# Patient Record
Sex: FEMALE | Race: BLACK OR AFRICAN AMERICAN | Employment: FULL TIME | URBAN - METROPOLITAN AREA
[De-identification: names, ages, dates, MRNs, and addresses within clinical notes are randomized per-mention and may not be internally consistent; named-entity substitution may affect disease eponyms.]

---

## 2017-01-05 ENCOUNTER — NURSE NAVIGATOR (OUTPATIENT)
Dept: CARDIOLOGY CLINIC | Age: 72
End: 2017-01-05

## 2017-03-20 ENCOUNTER — OFFICE VISIT (OUTPATIENT)
Dept: FAMILY MEDICINE CLINIC | Age: 72
End: 2017-03-20

## 2017-03-20 VITALS
OXYGEN SATURATION: 99 % | SYSTOLIC BLOOD PRESSURE: 133 MMHG | WEIGHT: 194.8 LBS | TEMPERATURE: 97.1 F | HEART RATE: 60 BPM | HEIGHT: 62 IN | RESPIRATION RATE: 18 BRPM | DIASTOLIC BLOOD PRESSURE: 74 MMHG | BODY MASS INDEX: 35.85 KG/M2

## 2017-03-20 DIAGNOSIS — I25.810 CORONARY ARTERY DISEASE INVOLVING CORONARY BYPASS GRAFT OF NATIVE HEART WITHOUT ANGINA PECTORIS: ICD-10-CM

## 2017-03-20 DIAGNOSIS — I49.5 SSS (SICK SINUS SYNDROME) (HCC): ICD-10-CM

## 2017-03-20 DIAGNOSIS — I10 ESSENTIAL HYPERTENSION: ICD-10-CM

## 2017-03-20 DIAGNOSIS — R82.81 PYURIA: ICD-10-CM

## 2017-03-20 DIAGNOSIS — I25.9 CHRONIC ISCHEMIC HEART DISEASE: ICD-10-CM

## 2017-03-20 DIAGNOSIS — Z79.899 ENCOUNTER FOR LONG-TERM (CURRENT) USE OF MEDICATIONS: ICD-10-CM

## 2017-03-20 DIAGNOSIS — Z13.39 SCREENING FOR ALCOHOLISM: ICD-10-CM

## 2017-03-20 DIAGNOSIS — Z95.0 S/P CARDIAC PACEMAKER PROCEDURE: ICD-10-CM

## 2017-03-20 DIAGNOSIS — Z12.11 COLON CANCER SCREENING: ICD-10-CM

## 2017-03-20 DIAGNOSIS — Z71.89 ADVANCED DIRECTIVES, COUNSELING/DISCUSSION: ICD-10-CM

## 2017-03-20 DIAGNOSIS — Z95.1 S/P CABG X 4: ICD-10-CM

## 2017-03-20 DIAGNOSIS — E78.5 DYSLIPIDEMIA: ICD-10-CM

## 2017-03-20 DIAGNOSIS — Z00.00 ROUTINE GENERAL MEDICAL EXAMINATION AT A HEALTH CARE FACILITY: Primary | ICD-10-CM

## 2017-03-20 LAB
BILIRUB UR QL STRIP: NEGATIVE
GLUCOSE UR-MCNC: NEGATIVE MG/DL
KETONES P FAST UR STRIP-MCNC: NEGATIVE MG/DL
PH UR STRIP: 5.5 [PH] (ref 4.6–8)
PROT UR QL STRIP: NORMAL MG/DL
SP GR UR STRIP: 1.03 (ref 1–1.03)
UA UROBILINOGEN AMB POC: NORMAL (ref 0.2–1)
URINALYSIS CLARITY POC: NORMAL
URINALYSIS COLOR POC: YELLOW
URINE BLOOD POC: NORMAL
URINE LEUKOCYTES POC: NORMAL
URINE NITRITES POC: POSITIVE

## 2017-03-20 RX ORDER — LOSARTAN POTASSIUM AND HYDROCHLOROTHIAZIDE 12.5; 5 MG/1; MG/1
TABLET ORAL
Qty: 90 TAB | Refills: 3 | Status: SHIPPED | OUTPATIENT
Start: 2017-03-20 | End: 2018-04-02 | Stop reason: SDUPTHER

## 2017-03-20 RX ORDER — SIMVASTATIN 40 MG/1
TABLET, FILM COATED ORAL
Qty: 90 TAB | Refills: 3 | Status: SHIPPED | OUTPATIENT
Start: 2017-03-20 | End: 2018-12-24 | Stop reason: SDUPTHER

## 2017-03-20 NOTE — PROGRESS NOTES
This is a Subsequent Medicare Annual Wellness Visit providing Personalized Prevention Plan Services (PPPS) (Performed 12 months after initial AWV and PPPS )    I have reviewed the patient's medical history in detail and updated the computerized patient record. History     Hyperlipidemia & HTN - also has hx of PSVT and CAD s/p 4vCABG and followed by Dr. Galen Lord. Pt is doing well on current meds with no medication side effects noted. BPs at home running 130-150s/80s  No new myalgias, no joint pains, no weakness  No TIA's, no chest pain on exertion, no dyspnea on exertion, no swelling of ankles. Exercising - Minimal, stays active  Dieting - Yes, drinks some sweet tea, but avoids fast foods and sodas.   Smoking - No     Lab Results   Component Value Date/Time    Cholesterol, total 155 03/22/2016 10:05 AM    HDL Cholesterol 48 03/22/2016 10:05 AM    LDL, calculated 95 03/22/2016 10:05 AM    Triglyceride 62 03/22/2016 10:05 AM       Past Medical History:   Diagnosis Date    Acquired cyst of kidney     CAD (coronary artery disease)     Cervical disc herniation     Essential hypertension     Hypercholesterolemia     Hypertension     PAF (paroxysmal atrial fibrillation) ([facility] [address] 6/9/2016    Paroxysmal supraventricular tachycardia ([facility] [address]     Postsurgical aortocoronary bypass status 3/20/2012    S/P cardiac pacemaker procedure 6/9/2016 6/9/16 [employer] dual chamber pacemaker implant    Urge incontinence       Past Surgical History:   Procedure Laterality Date    CABG, ARTERY-VEIN, FOUR      ENDOSCOPY, COLON, DIAGNOSTIC      due 2012 hx proctitis    HX CERVICAL DISKECTOMY      HX CORONARY ARTERY BYPASS GRAFT      HX HYSTERECTOMY      HX ORTHOPAEDIC      benign thumb tumor    HX UROLOGICAL  2011    stent for hydronephrosis     Current Outpatient Prescriptions   Medication Sig Dispense Refill    simvastatin (ZOCOR) 40 mg tablet TAKE 1 TABLET BY MOUTH EVERY EVENING 90 Tab 3    losartan-hydroCHLOROthiazide (HYZAAR) 50-12.5 mg per tablet TAKE 1 TABLET BY MOUTH DAILY. 90 Tab 3    PSYLLIUM HUSK (DAILY FIBER PO) Take  by mouth daily.  FOLIC ACID/MULTIVIT-MIN/LUTEIN (CENTRUM SILVER PO) Take  by mouth daily.  sotalol (BETAPACE) 80 mg tablet Take 1 Tab by mouth every twelve (12) hours. 60 Tab 11    aspirin (ASPIRIN) 325 mg tablet Take 325 mg by mouth daily.  omega-3 fatty acids-vitamin e (FISH OIL) 1,000 mg Cap Take  by mouth daily.        Allergies   Allergen Reactions    Ace Inhibitors Cough    Pcn [Penicillins] Anaphylaxis and Hives     Just got Rocephin in ED, no reactions    Niaspan [Niacin] Unable to Obtain     Family History   Problem Relation Age of Onset    Asthma Mother     Hypertension Father     Stroke Father     Cancer Sister      breast    Diabetes Brother     Heart Disease Sister     Heart Disease Brother      Social History   Substance Use Topics    Smoking status: Former Smoker     Packs/day: 0.50     Years: 10.00     Types: Cigarettes     Quit date: 6/9/2007    Smokeless tobacco: Never Used    Alcohol use No     Patient Active Problem List   Diagnosis Code    Cervical disc herniation M50.20    Hypertension I10    CAD (coronary artery disease) I25.10    Paroxysmal supraventricular tachycardia (HCC) I47.1    Encounter for long-term (current) use of medications Z79.899    Mixed hyperlipidemia E78.2    Chronic ischemic heart disease I25.9    Postsurgical aortocoronary bypass status Z95.1    Coronary atherosclerosis of native coronary artery I25.10    S/P CABG x 4 Z95.1    Urge incontinence N39.41    Bradycardia, sinus R00.1    Dyslipidemia E78.5    Advanced care planning/counseling discussion Z71.89    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 No    Do you average more than 7 drinks per week? No      Functional Ability and Level of Safety:     Hearing Loss   normal-to-mild    Activities of Daily Living   Self-care. Requires assistance with: no ADLs    Fall Risk     Fall Risk Assessment, last 12 mths 3/20/2017   Able to walk? Yes   Fall in past 12 months? No     Abuse Screen   Patient is not abused    Review of Systems   A comprehensive review of systems was negative except for that written in the HPI.     Physical Examination     Evaluation of Cognitive Function:  Mood/affect:  happy  Appearance: age appropriate  Family member/caregiver input: None    PE  Physical Examination:  General appearance - alert, well appearing, and in no distress  Eyes - sclera anicteric  Nose - normal and patent, no erythema, discharge or polyps  Mouth - mucous membranes moist, pharynx normal without lesions  Neck - supple, no significant adenopathy  Lymphatics - no palpable lymphadenopathy, no hepatosplenomegaly  Chest - clear to auscultation, no wheezes, rales or rhonchi, symmetric air entry  Heart - normal rate, regular rhythm, normal S1, S2, no murmurs, rubs, clicks or gallops  Abdomen - soft, nontender, nondistended, no masses or organomegaly  Breasts & Pelvic - not indicated  Neurological - alert, oriented, normal speech, no focal findings or movement disorder noted  Musculoskeletal - no joint tenderness, deformity or swelling  Extr - no edema  Skin - normal coloration and turgor, no rashes, no suspicious skin lesions noted    Patient Care Team:  Kim Lion MD as PCP - General (Family Practice)  Awais Cool, RN CCM as [address], MD (Cardiology)  Gabby Watson, RN as Ambulatory Care Navigator (Cardiology)    Advice/Referrals/Counseling   Education and counseling provided:  Are appropriate based on today's review and evaluation      Assessment/Plan       ICD-10-CM ICD-9-CM    1. Routine general medical examination at a health care facility Z00.00 V70.0 CBC W/O DIFF      HEMOGLOBIN A1C WITH EAG      LIPID PANEL      METABOLIC PANEL, COMPREHENSIVE      TSH 3RD GENERATION      AMB POC URINALYSIS DIP STICK AUTO W/O MICRO   2. Screening for alcoholism Z13.89 V79.1    3. Advanced directives, counseling/discussion Z71.89 V65.49 ADVANCE CARE PLANNING FIRST 30 MINS   4. SSS (sick sinus syndrome) (HCC) I49.5 427.81    5. S/P cardiac pacemaker procedure Z95.0 V45.01    6. Dyslipidemia E78.5 272.4 simvastatin (ZOCOR) 40 mg tablet      HEMOGLOBIN A1C WITH EAG      LIPID PANEL      METABOLIC PANEL, COMPREHENSIVE      TSH 3RD GENERATION   7. S/P CABG x 4 Z95.1 V45.81 simvastatin (ZOCOR) 40 mg tablet      LIPID PANEL   8. Chronic ischemic heart disease I25.9 414.9 simvastatin (ZOCOR) 40 mg tablet      LIPID PANEL   9. Essential hypertension I10 401.9 losartan-hydroCHLOROthiazide (HYZAAR) 50-12.5 mg per tablet      METABOLIC PANEL, COMPREHENSIVE   10. Coronary artery disease involving coronary bypass graft of native heart without angina pectoris I25.810 414.05 LIPID PANEL   11.  Encounter for long-term (current) use of medications Z79.899 V58.69 CBC W/O DIFF      HEMOGLOBIN A1C WITH EAG      LIPID PANEL      METABOLIC PANEL, COMPREHENSIVE      TSH 3RD GENERATION      AMB POC URINALYSIS DIP STICK AUTO W/O MICRO   12. Pyuria N39.0 791.9 CULTURE, URINE   13. Colon cancer screening Z12.11 V76.51

## 2017-03-20 NOTE — PROGRESS NOTES
Chief Complaint   Patient presents with   [facility] Annual Wellness Visit     Medicare   Room 2

## 2017-03-20 NOTE — MR AVS SNAPSHOT
Visit Information Date & Time Provider Department Dept. Phone Encounter #  
 3/20/2017 11:10 AM Meenakshi Garcia MD [facility] at [address] [id number] Follow-up Instructions Return in about 6 months (around 9/20/2017), or if symptoms worsen or fail to improve. Your Appointments 3/30/2017  9:15 AM  
PACEMAKER with PACEMAKER, [facility]) Appt Note: BSC DCPM 6mo check, declines remote at this time [id number] [facility]  
[phone] [address] [facility]  
  
    
 6/21/2017  9:15 AM  
ESTABLISHED PATIENT with Anna Monique MD  
[facility]) Appt Note: 6 MO. F/U  
 [address]  
[phone] [address] Upcoming Health Maintenance Date Due  
 GLAUCOMA SCREENING Q2Y 5/12/2016 MEDICARE YEARLY EXAM 9/21/2016 COLONOSCOPY 5/1/2017 BREAST CANCER SCRN MAMMOGRAM 6/3/2018 DTaP/Tdap/Td series (2 - Td) 3/20/2027 Allergies as of 3/20/2017  Review Complete On: 3/20/2017 By: Meenakshi Garcia MD  
  
 Severity Noted Reaction Type Reactions Ace Inhibitors Medium 06/09/2010   Side Effect Cough Pcn [Penicillins] Medium 06/09/2010   Systemic Anaphylaxis, Hives Just got Rocephin in ED, no reactions Niaspan [Niacin]  03/20/2012    Unable to Obtain Current Immunizations  Reviewed on 9/21/2015 Name Date Influenza High Dose Vaccine PF 12/22/2015 Pneumococcal Polysaccharide (PPSV-23) 6/11/2013 Not reviewed this visit You Were Diagnosed With   
  
 Codes Comments Routine general medical examination at a health care facility    -  Primary ICD-10-CM: Z00.00 ICD-9-CM: V70.0 Screening for alcoholism     ICD-10-CM: Z13.89 ICD-9-CM: V79.1 Advanced directives, counseling/discussion     ICD-10-CM: Z71.89 ICD-9-CM: V65.49   
 SSS (sick sinus syndrome) (HCC)     ICD-10-CM: I49.5 ICD-9-CM: 427.81 S/P cardiac pacemaker procedure     ICD-10-CM: Z95.0 ICD-9-CM: V45.01 Dyslipidemia     ICD-10-CM: E78.5 ICD-9-CM: 272.4 S/P CABG x 4     ICD-10-CM: Z95.1 ICD-9-CM: V45.81 Chronic ischemic heart disease     ICD-10-CM: I25.9 ICD-9-CM: 414.9 Essential hypertension     ICD-10-CM: I10 
ICD-9-CM: 401.9 Coronary artery disease involving coronary bypass graft of native heart without angina pectoris     ICD-10-CM: I25.810 ICD-9-CM: 414.05 Encounter for long-term (current) use of medications     ICD-10-CM: Z79.899 ICD-9-CM: V58.69 Vitals BP Pulse Temp Resp Height(growth percentile) Weight(growth percentile) 133/74 (BP 1 Location: Left arm, BP Patient Position: Sitting) 60 97.1 °F (36.2 °C) (Oral) 18 5' 2\" (1.575 m) 194 lb 12.8 oz (88.4 kg) LMP SpO2 BMI OB Status Smoking Status 08/24/1993 99% 35.63 kg/m2 Postmenopausal Former Smoker Vitals History BMI and BSA Data Body Mass Index Body Surface Area  
 35.63 kg/m 2 1.97 m 2 Preferred Pharmacy Pharmacy Name Phone [facility]/PHARMACY #[id number]- [facility], [address] [facility] 7 [phone] Your Updated Medication List  
  
   
This list is accurate as of: 3/20/17 12:32 PM.  Always use your most recent med list.  
  
  
  
  
 aspirin 325 mg tablet Commonly known as:  ASPIRIN Take 325 mg by mouth daily. CENTRUM SILVER PO Take  by mouth daily. DAILY FIBER PO Take  by mouth daily. FISH OIL 1,000 mg Cap Generic drug:  omega-3 fatty acids-vitamin e Take  by mouth daily. losartan-hydroCHLOROthiazide 50-12.5 mg per tablet Commonly known as:  HYZAAR  
TAKE 1 TABLET BY MOUTH DAILY. simvastatin 40 mg tablet Commonly known as:  ZOCOR  
TAKE 1 TABLET BY MOUTH EVERY EVENING  
  
 sotalol 80 mg tablet Commonly known as:  Derrick Colla Take 1 Tab by mouth every twelve (12) hours. Prescriptions Sent to Pharmacy Refills  
 simvastatin (ZOCOR) 40 mg tablet 3 Sig: TAKE 1 TABLET BY MOUTH EVERY EVENING Class: Normal  
 Pharmacy: [facility]/pharmacy #[id number]- [facility] - [address] Ph #: [phone]  
 losartan-hydroCHLOROthiazide (HYZAAR) 50-12.5 mg per tablet 3 Sig: TAKE 1 TABLET BY MOUTH DAILY. Class: Normal  
 Pharmacy: [address] Ph #: [phone] We Performed the Following ADVANCE CARE PLANNING FIRST 30 MINS [46694 CPT(R)] AMB POC URINALYSIS DIP STICK AUTO W/O MICRO [71542 CPT(R)] CBC W/O DIFF [98724 CPT(R)] HEMOGLOBIN A1C WITH EAG [72491 CPT(R)] LIPID PANEL [18345 CPT(R)] METABOLIC PANEL, COMPREHENSIVE [71349 CPT(R)] TSH 3RD GENERATION [53930 CPT(R)] Follow-up Instructions Return in about 6 months (around 9/20/2017), or if symptoms worsen or fail to improve. Please provide this summary of care documentation to your next provider. Your primary care clinician is listed as Talia Varma. If you have any questions after today's visit, please call [phone].

## 2017-03-20 NOTE — ACP (ADVANCE CARE PLANNING)
Advance Care Planning    Advance Care Planning (ACP) Provider Conversation Snapshot    Date of ACP Conversation: 03/20/17  Persons included in Conversation:  patient  Length of ACP Conversation in minutes:  16 minutes    Authorized Decision Maker (if patient is incapable of making informed decisions):    This person is:   Healthcare Agent/Medical Power of  under Advance Directive          For Patients with Decision Making Capacity:   Values/Goals: Exploration of values, goals, and preferences if recovery is not expected, even with continued medical treatment in the event of:  Imminent death  Severe, permanent brain injury    Conversation Outcomes / Follow-Up Plan:   Recommended completion of Advance Directive form after review of ACP materials and conversation with prospective healthcare agent

## 2017-03-21 LAB
ALBUMIN SERPL-MCNC: 4.1 G/DL (ref 3.5–4.8)
ALBUMIN/GLOB SERPL: 1.4 {RATIO} (ref 1.2–2.2)
ALP SERPL-CCNC: 80 IU/L (ref 39–117)
ALT SERPL-CCNC: 13 IU/L (ref 0–32)
AST SERPL-CCNC: 22 IU/L (ref 0–40)
BILIRUB SERPL-MCNC: 0.4 MG/DL (ref 0–1.2)
BUN SERPL-MCNC: 17 MG/DL (ref 8–27)
BUN/CREAT SERPL: 21 (ref 11–26)
CALCIUM SERPL-MCNC: 9 MG/DL (ref 8.7–10.3)
CHLORIDE SERPL-SCNC: 105 MMOL/L (ref 96–106)
CHOLEST SERPL-MCNC: 151 MG/DL (ref 100–199)
CO2 SERPL-SCNC: 24 MMOL/L (ref 18–29)
CREAT SERPL-MCNC: 0.82 MG/DL (ref 0.57–1)
ERYTHROCYTE [DISTWIDTH] IN BLOOD BY AUTOMATED COUNT: 15 % (ref 12.3–15.4)
EST. AVERAGE GLUCOSE BLD GHB EST-MCNC: 131 MG/DL
GLOBULIN SER CALC-MCNC: 3 G/DL (ref 1.5–4.5)
GLUCOSE SERPL-MCNC: 89 MG/DL (ref 65–99)
HBA1C MFR BLD: 6.2 % (ref 4.8–5.6)
HCT VFR BLD AUTO: 38.1 % (ref 34–46.6)
HDLC SERPL-MCNC: 45 MG/DL
HGB BLD-MCNC: 12.8 G/DL (ref 11.1–15.9)
LDLC SERPL CALC-MCNC: 88 MG/DL (ref 0–99)
MCH RBC QN AUTO: 26.7 PG (ref 26.6–33)
MCHC RBC AUTO-ENTMCNC: 33.6 G/DL (ref 31.5–35.7)
MCV RBC AUTO: 79 FL (ref 79–97)
NRBC BLD AUTO-RTO: 0 %
PLATELET # BLD AUTO: 193 X10E3/UL (ref 150–379)
POTASSIUM SERPL-SCNC: 4.4 MMOL/L (ref 3.5–5.2)
PROT SERPL-MCNC: 7.1 G/DL (ref 6–8.5)
RBC # BLD AUTO: 4.8 X10E6/UL (ref 3.77–5.28)
SODIUM SERPL-SCNC: 143 MMOL/L (ref 134–144)
TRIGL SERPL-MCNC: 90 MG/DL (ref 0–149)
TSH SERPL DL<=0.005 MIU/L-ACNC: 1.75 UIU/ML (ref 0.45–4.5)
VLDLC SERPL CALC-MCNC: 18 MG/DL (ref 5–40)
WBC # BLD AUTO: 3.5 X10E3/UL (ref 3.4–10.8)

## 2017-03-23 LAB
BACTERIA UR CULT: ABNORMAL
BACTERIA UR CULT: ABNORMAL

## 2017-03-23 NOTE — PROGRESS NOTES
pls call - if she is having urinary symptoms, we should tx with Bactrim. Otherwise, would just call this asymptomatic bacteriuria.   Thanks

## 2017-03-29 NOTE — PROGRESS NOTES
Patient advised urine culture results.  She states that she is not having symptoms at this time but would like prescription for Bactrim sent to pharmacy

## 2017-03-30 ENCOUNTER — CLINICAL SUPPORT (OUTPATIENT)
Dept: CARDIOLOGY CLINIC | Age: 72
End: 2017-03-30

## 2017-03-30 DIAGNOSIS — I49.5 SSS (SICK SINUS SYNDROME) (HCC): ICD-10-CM

## 2017-03-30 DIAGNOSIS — I47.1 PAROXYSMAL SUPRAVENTRICULAR TACHYCARDIA (HCC): ICD-10-CM

## 2017-03-30 DIAGNOSIS — I44.0 FIRST DEGREE AV BLOCK: ICD-10-CM

## 2017-03-30 DIAGNOSIS — Z95.0 S/P CARDIAC PACEMAKER PROCEDURE: Primary | ICD-10-CM

## 2017-03-30 RX ORDER — SULFAMETHOXAZOLE AND TRIMETHOPRIM 800; 160 MG/1; MG/1
1 TABLET ORAL 2 TIMES DAILY
Qty: 6 TAB | Refills: 0 | Status: SHIPPED | OUTPATIENT
Start: 2017-03-30 | End: 2017-04-02

## 2017-05-09 RX ORDER — SOTALOL HYDROCHLORIDE 80 MG/1
80 TABLET ORAL EVERY 12 HOURS
Qty: 60 TAB | Refills: 11 | Status: SHIPPED | OUTPATIENT
Start: 2017-05-09 | End: 2018-06-14 | Stop reason: SDUPTHER

## 2017-06-21 ENCOUNTER — OFFICE VISIT (OUTPATIENT)
Dept: CARDIOLOGY CLINIC | Age: 72
End: 2017-06-21

## 2017-06-21 VITALS
WEIGHT: 192 LBS | SYSTOLIC BLOOD PRESSURE: 131 MMHG | HEIGHT: 62 IN | DIASTOLIC BLOOD PRESSURE: 75 MMHG | OXYGEN SATURATION: 99 % | BODY MASS INDEX: 35.33 KG/M2 | HEART RATE: 60 BPM

## 2017-06-21 DIAGNOSIS — Z95.0 S/P CARDIAC PACEMAKER PROCEDURE: ICD-10-CM

## 2017-06-21 DIAGNOSIS — I49.1 APC (ATRIAL PREMATURE CONTRACTIONS): ICD-10-CM

## 2017-06-21 DIAGNOSIS — I25.810 CORONARY ARTERY DISEASE INVOLVING CORONARY BYPASS GRAFT OF NATIVE HEART WITHOUT ANGINA PECTORIS: ICD-10-CM

## 2017-06-21 DIAGNOSIS — I49.5 SSS (SICK SINUS SYNDROME) (HCC): ICD-10-CM

## 2017-06-21 DIAGNOSIS — E78.2 MIXED HYPERLIPIDEMIA: ICD-10-CM

## 2017-06-21 DIAGNOSIS — I25.9 CHRONIC ISCHEMIC HEART DISEASE: ICD-10-CM

## 2017-06-21 DIAGNOSIS — I48.0 PAF (PAROXYSMAL ATRIAL FIBRILLATION) (HCC): Primary | ICD-10-CM

## 2017-06-21 DIAGNOSIS — E78.5 DYSLIPIDEMIA: ICD-10-CM

## 2017-06-21 DIAGNOSIS — I10 ESSENTIAL HYPERTENSION: ICD-10-CM

## 2017-06-21 RX ORDER — PNEUMOCOCCAL VACCINE POLYVALENT 25; 25; 25; 25; 25; 25; 25; 25; 25; 25; 25; 25; 25; 25; 25; 25; 25; 25; 25; 25; 25; 25; 25 UG/.5ML; UG/.5ML; UG/.5ML; UG/.5ML; UG/.5ML; UG/.5ML; UG/.5ML; UG/.5ML; UG/.5ML; UG/.5ML; UG/.5ML; UG/.5ML; UG/.5ML; UG/.5ML; UG/.5ML; UG/.5ML; UG/.5ML; UG/.5ML; UG/.5ML; UG/.5ML; UG/.5ML; UG/.5ML; UG/.5ML
INJECTION, SOLUTION INTRAMUSCULAR; SUBCUTANEOUS
Refills: 0 | COMMUNITY
Start: 2017-03-30 | End: 2017-09-21 | Stop reason: ALTCHOICE

## 2017-09-21 ENCOUNTER — OFFICE VISIT (OUTPATIENT)
Dept: FAMILY MEDICINE CLINIC | Age: 72
End: 2017-09-21

## 2017-09-21 VITALS
TEMPERATURE: 97.1 F | DIASTOLIC BLOOD PRESSURE: 77 MMHG | BODY MASS INDEX: 35.33 KG/M2 | WEIGHT: 192 LBS | HEART RATE: 62 BPM | OXYGEN SATURATION: 95 % | SYSTOLIC BLOOD PRESSURE: 139 MMHG | RESPIRATION RATE: 16 BRPM | HEIGHT: 62 IN

## 2017-09-21 DIAGNOSIS — I49.5 SSS (SICK SINUS SYNDROME) (HCC): ICD-10-CM

## 2017-09-21 DIAGNOSIS — Z95.1 S/P CABG X 4: ICD-10-CM

## 2017-09-21 DIAGNOSIS — R73.03 PREDIABETES: ICD-10-CM

## 2017-09-21 DIAGNOSIS — I25.10 ATHEROSCLEROSIS OF NATIVE CORONARY ARTERY OF NATIVE HEART WITHOUT ANGINA PECTORIS: ICD-10-CM

## 2017-09-21 DIAGNOSIS — I48.0 PAF (PAROXYSMAL ATRIAL FIBRILLATION) (HCC): ICD-10-CM

## 2017-09-21 DIAGNOSIS — I25.9 CHRONIC ISCHEMIC HEART DISEASE: Primary | ICD-10-CM

## 2017-09-21 DIAGNOSIS — Z95.0 S/P CARDIAC PACEMAKER PROCEDURE: ICD-10-CM

## 2017-09-21 DIAGNOSIS — I47.1 PAROXYSMAL SUPRAVENTRICULAR TACHYCARDIA (HCC): ICD-10-CM

## 2017-09-21 DIAGNOSIS — Z12.11 COLON CANCER SCREENING: ICD-10-CM

## 2017-09-21 DIAGNOSIS — I10 ESSENTIAL HYPERTENSION: ICD-10-CM

## 2017-09-21 DIAGNOSIS — Z79.899 ENCOUNTER FOR LONG-TERM (CURRENT) USE OF MEDICATIONS: ICD-10-CM

## 2017-09-21 NOTE — PROGRESS NOTES
Subjective:     Chief Complaint   Patient presents with    Hypertension     6 month follow-up        She  is a 67 y.o. female who presents for evaluation of:    Hyperlipidemia & HTN - also has hx of PSVT and CAD s/p 4vCABG and followed by Dr. Bhavana Strickland. Pt is doing well on current meds with no medication side effects noted. BPs at home running 130-150s/80s  No new myalgias, no joint pains, no weakness  No TIA's, no chest pain on exertion, no dyspnea on exertion, no swelling of ankles. Exercising - Minimal, stays active  Dieting - Yes, drinks some sweet tea, but avoids fast foods and sodas.   Smoking - No     Lab Results   Component Value Date/Time    Cholesterol, total 151 03/20/2017 12:56 PM    HDL Cholesterol 45 03/20/2017 12:56 PM    LDL, calculated 88 03/20/2017 12:56 PM    Triglyceride 90 03/20/2017 12:56 PM     ROS  Gen - no fever/chills  Resp - no dyspnea or cough  CV - no chest pain or GARZA  Rest per HPI     Objective:     Vitals:    09/21/17 1041   BP: 139/77   Pulse: 62   Resp: 16   Temp: 97.1 °F (36.2 °C)   TempSrc: Oral   SpO2: 95%   Weight: 192 lb (87.1 kg)   Height: 5' 2\" (1.575 m)     Physical Examination:  General appearance - alert, well appearing, and in no distress  Eyes -sclera anicteric  Neck - supple, no significant adenopathy, no thyromegaly, no bruits  Chest - clear to auscultation, no wheezes, rales or rhonchi, symmetric air entry  Heart - normal rate, regular rhythm, normal S1, S2, no murmurs, rubs, clicks or gallops, + pacemaker  Neurological - alert, oriented, normal speech, no focal findings or movement disorder noted  Extr - trace edema bilat  Skin - central chest surgical scar noted    Past Medical History:   Diagnosis Date    Acquired cyst of kidney     CAD (coronary artery disease)     Cervical disc herniation     Essential hypertension     Hypercholesterolemia     Hypertension     PAF (paroxysmal atrial fibrillation) ([facility] [address] 6/9/2016    Paroxysmal supraventricular tachycardia ([facility] [address]     Postsurgical aortocoronary bypass status 3/20/2012    S/P cardiac pacemaker procedure 6/9/2016 6/9/16 [employer] dual chamber pacemaker implant    Urge incontinence      Past Surgical History:   Procedure Laterality Date    CABG, ARTERY-VEIN, FOUR      ENDOSCOPY, COLON, DIAGNOSTIC      due 2012 hx proctitis    HX CERVICAL DISKECTOMY      HX CORONARY ARTERY BYPASS GRAFT      HX HYSTERECTOMY      HX ORTHOPAEDIC      benign thumb tumor    HX UROLOGICAL  2011    stent for hydronephrosis     Current Outpatient Prescriptions on File Prior to Visit   Medication Sig Dispense Refill    sotalol (BETAPACE) 80 mg tablet Take 1 Tab by mouth every twelve (12) hours. 60 Tab 11    simvastatin (ZOCOR) 40 mg tablet TAKE 1 TABLET BY MOUTH EVERY EVENING 90 Tab 3    losartan-hydroCHLOROthiazide (HYZAAR) 50-12.5 mg per tablet TAKE 1 TABLET BY MOUTH DAILY. 90 Tab 3    PSYLLIUM HUSK (DAILY FIBER PO) Take  by mouth daily.  FOLIC ACID/MULTIVIT-MIN/LUTEIN (CENTRUM SILVER PO) Take  by mouth daily.  aspirin (ASPIRIN) 325 mg tablet Take 325 mg by mouth daily.  omega-3 fatty acids-vitamin e (FISH OIL) 1,000 mg Cap Take  by mouth daily. No current facility-administered medications on file prior to visit. Allergies   Allergen Reactions    Ace Inhibitors Cough    Pcn [Penicillins] Anaphylaxis and Hives     Just got Rocephin in ED, no reactions    Niaspan [Niacin] Unable to Obtain       Assessment/ Plan:   Diagnoses and all orders for this visit:    1. Chronic ischemic heart disease  2. Atherosclerosis of native coronary artery of native heart without angina pectoris  3. S/P CABG x 4  - Followed by Dr. Hopper Figures. Lipids at goal.  No sx. Planning to get stress test in future. -     LIPID PANEL  -     METABOLIC PANEL, COMPREHENSIVE    4. Essential hypertension - controlled  -     METABOLIC PANEL, COMPREHENSIVE    5. PAF (paroxysmal atrial fibrillation) ([facility] [address]  6.  SSS (sick sinus syndrome) (HCC)  7. Paroxysmal supraventricular tachycardia ([facility] [address]  8. S/P cardiac pacemaker procedure  - Followed by Dr. Maria E Waller and Dr. Rob Mark    9. Prediabetes    10. Encounter for long-term (current) use of medications  -     LIPID PANEL  -     METABOLIC PANEL, COMPREHENSIVE    11. Colon cancer screening  -     REFERRAL TO GASTROENTEROLOGY    I have discussed the diagnosis with the patient and the intended plan as seen in the above orders. The patient has received an after-visit summary and questions were answered concerning future plans. I have discussed medication side effects and warnings with the patient as well. Follow-up Disposition:  Return in about 6 months (around 3/21/2018), or if symptoms worsen or fail to improve.

## 2017-09-21 NOTE — MR AVS SNAPSHOT
Visit Information Date & Time Provider Department Dept. Phone Encounter #  
 9/21/2017 10:30 AM Julieta Olsen MD [facility] at [address] [id number] Follow-up Instructions Return in about 6 months (around 3/21/2018), or if symptoms worsen or fail to improve. Your Appointments 10/3/2017  9:45 AM  
PACEMAKER with PACEMAKER, [facility]) Appt Note: BSC DCPM 6mo check, annual with Dr. Ge Tinoco, prefers AM appts; [facility] DCPM 6mo check, annual with Dr. Ge Tinoco, prefers AM appts [address]  
[phone] [address]  
  
    
 10/3/2017  9:45 AM  
ANNUAL with Bozena Fernandes MD  
[facility]) Appt Note: BSC DCPM 6mo check, annual with Dr. Ge Tinoco, prefers AM appts [address]  
[phone] [address]  
  
    
 12/20/2017  9:15 AM  
ESTABLISHED PATIENT with Zoie Singh MD  
[facility]) Appt Note: 6 MO. F/U  
 [address] [place], [place] [address] [address] [place] [address]  
[phone] [address] Upcoming Health Maintenance Date Due  
 GLAUCOMA SCREENING Q2Y 5/12/2016 COLONOSCOPY 5/1/2017 INFLUENZA AGE 9 TO ADULT 11/16/2017* MEDICARE YEARLY EXAM 3/21/2018 BREAST CANCER SCRN MAMMOGRAM 6/3/2018 DTaP/Tdap/Td series (2 - Td) 3/20/2027 *Topic was postponed. The date shown is not the original due date. Allergies as of 9/21/2017  Review Complete On: 9/21/2017 By: Julieta Olsen MD  
  
 Severity Noted Reaction Type Reactions Ace Inhibitors Medium 06/09/2010   Side Effect Cough Pcn [Penicillins] Medium 06/09/2010   Systemic Anaphylaxis, Hives Just got Rocephin in ED, no reactions Niaspan [Niacin]  03/20/2012    Unable to Obtain Current Immunizations  Reviewed on 9/21/2015 Name Date Influenza High Dose Vaccine PF 12/22/2015 Pneumococcal Polysaccharide (PPSV-23) 3/3/2017, 6/11/2013 Not reviewed this visit You Were Diagnosed With   
  
 Codes Comments Chronic ischemic heart disease    -  Primary ICD-10-CM: I25.9 ICD-9-CM: 414.9 Atherosclerosis of native coronary artery of native heart without angina pectoris     ICD-10-CM: I25.10 ICD-9-CM: 414.01 S/P CABG x 4     ICD-10-CM: Z95.1 ICD-9-CM: V45.81 Essential hypertension     ICD-10-CM: I10 
ICD-9-CM: 401.9 PAF (paroxysmal atrial fibrillation) (HCC)     ICD-10-CM: I48.0 ICD-9-CM: 427.31   
 SSS (sick sinus syndrome) (HCC)     ICD-10-CM: I49.5 ICD-9-CM: 427.81 Paroxysmal supraventricular tachycardia (HCC)     ICD-10-CM: I47.1 ICD-9-CM: 427.0 S/P cardiac pacemaker procedure     ICD-10-CM: Z95.0 ICD-9-CM: V45.01 Prediabetes     ICD-10-CM: R73.03 
ICD-9-CM: 790.29 Encounter for long-term (current) use of medications     ICD-10-CM: Z79.899 ICD-9-CM: V58.69 Colon cancer screening     ICD-10-CM: Z12.11 ICD-9-CM: V76.51 Vitals BP Pulse Temp Resp Height(growth percentile) Weight(growth percentile) 139/77 (BP 1 Location: Left arm, BP Patient Position: Sitting) 62 97.1 °F (36.2 °C) (Oral) 16 5' 2\" (1.575 m) 192 lb (87.1 kg) LMP SpO2 BMI OB Status Smoking Status 08/24/1993 95% 35.12 kg/m2 Postmenopausal Former Smoker Vitals History BMI and BSA Data Body Mass Index Body Surface Area  
 35.12 kg/m 2 1.95 m 2 Preferred Pharmacy Pharmacy Name Phone [facility]/PHARMACY #[id number]- [place]athy [facility], [id number] [place], [place] [address] 8 [phone] Your Updated Medication List  
  
   
This list is accurate as of: 9/21/17 11:02 AM.  Always use your most recent med list.  
  
  
  
  
 aspirin 325 mg tablet Commonly known as:  ASPIRIN Take 325 mg by mouth daily. CENTRUM SILVER PO Take  by mouth daily. DAILY FIBER PO Take  by mouth daily. FISH OIL 1,000 mg Cap Generic drug:  omega-3 fatty acids-vitamin e Take  by mouth daily. losartan-hydroCHLOROthiazide 50-12.5 mg per tablet Commonly known as:  HYZAAR  
TAKE 1 TABLET BY MOUTH DAILY. simvastatin 40 mg tablet Commonly known as:  ZOCOR  
TAKE 1 TABLET BY MOUTH EVERY EVENING  
  
 sotalol 80 mg tablet Commonly known as:  [place] Take 1 Tab by mouth every twelve (12) hours. We Performed the Following LIPID PANEL [15783 CPT(R)] METABOLIC PANEL, COMPREHENSIVE [56786 CPT(R)] REFERRAL TO GASTROENTEROLOGY [EQM86 Custom] Comments: At [address] [facility] location for CRCS please. Follow-up Instructions Return in about 6 months (around 3/21/2018), or if symptoms worsen or fail to improve. Referral Information Referral ID Referred By Referred To  
  
 [id number] [facility] [address] [address] [place], [address] [place] Visits Status Start Date End Date 1 New Request 9/21/17 9/21/18 If your referral has a status of pending review or denied, additional information will be sent to support the outcome of this decision. Please provide this summary of care documentation to your next provider. Your primary care clinician is listed as Juan Antonio Dobbins. If you have any questions after today's visit, please call [phone].

## 2017-09-22 LAB
ALBUMIN SERPL-MCNC: 4.1 G/DL (ref 3.5–4.8)
ALBUMIN/GLOB SERPL: 1.3 {RATIO} (ref 1.2–2.2)
ALP SERPL-CCNC: 94 IU/L (ref 39–117)
ALT SERPL-CCNC: 12 IU/L (ref 0–32)
AST SERPL-CCNC: 20 IU/L (ref 0–40)
BILIRUB SERPL-MCNC: 0.5 MG/DL (ref 0–1.2)
BUN SERPL-MCNC: 13 MG/DL (ref 8–27)
BUN/CREAT SERPL: 15 (ref 12–28)
CALCIUM SERPL-MCNC: 9 MG/DL (ref 8.7–10.3)
CHLORIDE SERPL-SCNC: 100 MMOL/L (ref 96–106)
CHOLEST SERPL-MCNC: 132 MG/DL (ref 100–199)
CO2 SERPL-SCNC: 25 MMOL/L (ref 18–29)
CREAT SERPL-MCNC: 0.87 MG/DL (ref 0.57–1)
GLOBULIN SER CALC-MCNC: 3.2 G/DL (ref 1.5–4.5)
GLUCOSE SERPL-MCNC: 88 MG/DL (ref 65–99)
HDLC SERPL-MCNC: 43 MG/DL
LDLC SERPL CALC-MCNC: 75 MG/DL (ref 0–99)
POTASSIUM SERPL-SCNC: 4.1 MMOL/L (ref 3.5–5.2)
PROT SERPL-MCNC: 7.3 G/DL (ref 6–8.5)
SODIUM SERPL-SCNC: 140 MMOL/L (ref 134–144)
TRIGL SERPL-MCNC: 68 MG/DL (ref 0–149)
VLDLC SERPL CALC-MCNC: 14 MG/DL (ref 5–40)

## 2017-10-03 ENCOUNTER — CLINICAL SUPPORT (OUTPATIENT)
Dept: CARDIOLOGY CLINIC | Age: 72
End: 2017-10-03

## 2017-10-03 ENCOUNTER — OFFICE VISIT (OUTPATIENT)
Dept: CARDIOLOGY CLINIC | Age: 72
End: 2017-10-03

## 2017-10-03 VITALS
DIASTOLIC BLOOD PRESSURE: 72 MMHG | HEIGHT: 62 IN | OXYGEN SATURATION: 95 % | BODY MASS INDEX: 36.07 KG/M2 | SYSTOLIC BLOOD PRESSURE: 120 MMHG | HEART RATE: 59 BPM | RESPIRATION RATE: 16 BRPM | WEIGHT: 196 LBS

## 2017-10-03 DIAGNOSIS — I48.0 PAF (PAROXYSMAL ATRIAL FIBRILLATION) (HCC): ICD-10-CM

## 2017-10-03 DIAGNOSIS — R00.1 BRADYCARDIA, SINUS: ICD-10-CM

## 2017-10-03 DIAGNOSIS — I10 ESSENTIAL HYPERTENSION: ICD-10-CM

## 2017-10-03 DIAGNOSIS — Z95.0 S/P CARDIAC PACEMAKER PROCEDURE: Primary | ICD-10-CM

## 2017-10-03 DIAGNOSIS — I49.5 SSS (SICK SINUS SYNDROME) (HCC): Primary | ICD-10-CM

## 2017-10-03 DIAGNOSIS — I25.810 CORONARY ARTERY DISEASE INVOLVING CORONARY BYPASS GRAFT OF NATIVE HEART WITHOUT ANGINA PECTORIS: ICD-10-CM

## 2017-10-03 NOTE — MR AVS SNAPSHOT
Visit Information Date & Time Provider Department Dept. Phone Encounter #  
 10/3/2017  9:45 AM Charley Cook, [address] [facility] [phone] [id number] Your Appointments 12/20/2017  9:15 AM  
ESTABLISHED PATIENT with Jarrod Brown MD  
[facility]) Appt Note: 6 MO. F/U  
 [address]  
[phone] [address]  
  
    
 3/22/2018 10:30 AM  
ROUTINE CARE with Lucy Strange MD  
[facility] at ED [facility]) Appt Note: 6m fu  
 [address] [facility] [address] [id number]  
[place] [address] [address]  
  
    
 4/3/2018  9:30 AM  
PACEMAKER with PACEMAKER, [facility]) Appt Note: 6mo bsc dcpm  
 [address] [facility]  
[phone] [address] [facility] Upcoming Health Maintenance Date Due  
 GLAUCOMA SCREENING Q2Y 5/12/2016 COLONOSCOPY 5/1/2017 INFLUENZA AGE 9 TO ADULT 11/16/2017* MEDICARE YEARLY EXAM 3/21/2018 BREAST CANCER SCRN MAMMOGRAM 6/3/2018 DTaP/Tdap/Td series (2 - Td) 3/20/2027 *Topic was postponed. The date shown is not the original due date. Allergies as of 10/3/2017  Review Complete On: 10/3/2017 By: Allen Garza LPN Severity Noted Reaction Type Reactions Ace Inhibitors Medium 06/09/2010   Side Effect Cough Pcn [Penicillins] Medium 06/09/2010   Systemic Anaphylaxis, Hives Just got Rocephin in ED, no reactions Niaspan [Niacin]  03/20/2012    Unable to Obtain Current Immunizations  Reviewed on 9/21/2015 Name Date Influenza High Dose Vaccine PF 12/22/2015 Pneumococcal Polysaccharide (PPSV-23) 3/3/2017, 6/11/2013 Not reviewed this visit You Were Diagnosed With   
  
 Codes Comments SSS (sick sinus syndrome) ([facility])    -  Primary ICD-10-CM: I49.5 ICD-9-CM: 427.81   
 PAF (paroxysmal atrial fibrillation) ([facility])     ICD-10-CM: I48.0 ICD-9-CM: 427.31 Essential hypertension     ICD-10-CM: I10 
ICD-9-CM: 401.9 Coronary artery disease involving coronary bypass graft of native heart without angina pectoris     ICD-10-CM: I25.810 ICD-9-CM: 414.05 Vitals BP Pulse Resp Height(growth percentile) Weight(growth percentile) LMP  
 120/72 (BP 1 Location: Right arm, BP Patient Position: Sitting) (!) 59 16 5' 2\" (1.575 m) 196 lb (88.9 kg) 08/24/1993 SpO2 BMI OB Status Smoking Status 95% 35.85 kg/m2 Postmenopausal Former Smoker Vitals History BMI and BSA Data Body Mass Index Body Surface Area  
 35.85 kg/m 2 1.97 m 2 Preferred Pharmacy Pharmacy Name Phone [facility][id number]- [id number] [phone] Your Updated Medication List  
  
   
This list is accurate as of: 10/3/17 10:17 AM.  Always use your most recent med list.  
  
  
  
  
 aspirin 325 mg tablet Commonly known as:  ASPIRIN Take 325 mg by mouth daily. CENTRUM SILVER PO Take  by mouth daily. DAILY FIBER PO Take  by mouth daily. FISH OIL 1,000 mg Cap Generic drug:  omega-3 fatty acids-vitamin e Take  by mouth daily. losartan-hydroCHLOROthiazide 50-12.5 mg per tablet Commonly known as:  HYZAAR  
TAKE 1 TABLET BY MOUTH DAILY. simvastatin 40 mg tablet Commonly known as:  ZOCOR  
TAKE 1 TABLET BY MOUTH EVERY EVENING  
  
 sotalol 80 mg tablet Commonly known as:  Ami Ni Take 1 Tab by mouth every twelve (12) hours. We Performed the Following AMB POC EKG ROUTINE W/ 12 LEADS, INTER & REP [84062 CPT(R)] Introducing \[facility] [place] [facility]\"" & HEALTH SERVICES! Dear Maynor Mishra: 
Thank you for requesting a MyChart account.   Our records indicate that you have previously registered for a [employer] account but its currently inactive. Please call our [employer] support line at [phone]. Additional Information If you have questions, please visit the Frequently Asked Questions section of the [employer] website at [email or web address] Remember, [employer] is NOT to be used for urgent needs. For medical emergencies, dial 911. Now available from your iPhone and Android! Please provide this summary of care documentation to your next provider. Your primary care clinician is listed as Roel Porras. If you have any questions after today's visit, please call [phone].

## 2017-10-03 NOTE — PROGRESS NOTES
Subjective:      Evelin Hall is a 67 y.o. female is here for yearly device follow up. She is doing well. The patient denies chest pain/ shortness of breath, orthopnea, PND, LE edema, palpitations, syncope, presyncope or fatigue.        Patient Active Problem List    Diagnosis Date Noted    Prediabetes 09/21/2017    S/P cardiac pacemaker procedure 06/09/2016    PAF (paroxysmal atrial fibrillation) (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 [address] 05/23/2016    Advanced care planning/counseling discussion 03/22/2016    Dyslipidemia 06/12/2014    Bradycardia, sinus 05/10/2013    Urge incontinence     S/P CABG x 4 09/20/2012    Mixed hyperlipidemia 03/20/2012    Chronic ischemic heart disease 03/20/2012    Postsurgical aortocoronary bypass status 03/20/2012    Coronary atherosclerosis of native coronary artery 03/20/2012    Encounter for long-term (current) use of medications 07/28/2010    Cervical disc herniation     Hypertension     CAD (coronary artery disease)     Paroxysmal supraventricular tachycardia (HCC)       Talia Varma MD  Past Medical History:   Diagnosis Date    Acquired cyst of kidney     CAD (coronary artery disease)     Cervical disc herniation     Essential hypertension     Hypercholesterolemia     Hypertension     PAF (paroxysmal atrial fibrillation) ([address] 6/9/2016    Paroxysmal supraventricular tachycardia ([address]     Postsurgical aortocoronary bypass status 3/20/2012    S/P cardiac pacemaker procedure 6/9/2016 6/9/16 [employer] dual chamber pacemaker implant    Urge incontinence       Past Surgical History:   Procedure Laterality Date    CABG, ARTERY-VEIN, FOUR      ENDOSCOPY, COLON, DIAGNOSTIC      due 2012 hx proctitis    HX CERVICAL DISKECTOMY      HX CORONARY ARTERY BYPASS GRAFT      HX HYSTERECTOMY      HX ORTHOPAEDIC      benign thumb tumor    HX UROLOGICAL  2011    stent for hydronephrosis     Allergies   Allergen Reactions    Ace Inhibitors Cough    Pcn [Penicillins] Anaphylaxis and Hives     Just got Rocephin in ED, no reactions    Niaspan [Niacin] Unable to Obtain      Family History   Problem Relation Age of Onset    Asthma Mother     Hypertension Father     Stroke Father     Cancer Sister      breast    Diabetes Brother     Heart Disease Sister     Heart Disease Brother     negative for cardiac disease  Social History     Social History    Marital status:      Spouse name: N/A    Number of children: N/A    Years of education: N/A     Social History Main Topics    Smoking status: Former Smoker     Packs/day: 0.50     Years: 10.00     Types: Cigarettes     Quit date: 6/9/2007    Smokeless tobacco: Never Used    Alcohol use No    Drug use: No    Sexual activity: No     Other Topics Concern    None     Social History Narrative     Current Outpatient Prescriptions   Medication Sig    sotalol (BETAPACE) 80 mg tablet Take 1 Tab by mouth every twelve (12) hours.  simvastatin (ZOCOR) 40 mg tablet TAKE 1 TABLET BY MOUTH EVERY EVENING    losartan-hydroCHLOROthiazide (HYZAAR) 50-12.5 mg per tablet TAKE 1 TABLET BY MOUTH DAILY.  PSYLLIUM HUSK (DAILY FIBER PO) Take  by mouth daily.  FOLIC ACID/MULTIVIT-MIN/LUTEIN (CENTRUM SILVER PO) Take  by mouth daily.  aspirin (ASPIRIN) 325 mg tablet Take 325 mg by mouth daily.  omega-3 fatty acids-vitamin e (FISH OIL) 1,000 mg Cap Take  by mouth daily. No current facility-administered medications for this visit. Vitals:    10/03/17 0933   BP: 120/72   Pulse: (!) 59   Resp: 16   SpO2: 95%   Weight: 196 lb (88.9 kg)   Height: 5' 2\" (1.575 m)       I have reviewed the nurses notes, vitals, problem list, allergy list, medical history, family, social history and medications.     Review of Symptoms:    General: Pt denies excessive weight gain or loss. Pt is able to conduct ADL's  HEENT: Denies blurred vision, headaches, epistaxis and difficulty swallowing. Respiratory: Denies shortness of breath, GARZA, wheezing or stridor. Cardiovascular: Denies precordial pain, palpitations, edema or PND  Gastrointestinal: Denies poor appetite, indigestion, abdominal pain or blood in stool  Urinary: Denies dysuria, pyuria  Musculoskeletal: Denies pain or swelling from muscles or joints  Neurologic: Denies tremor, paresthesias, or sensory motor disturbance  Skin: Denies rash, itching or texture change. Psych: Denies depression      Physical Exam:      General: Well developed, in no acute distress. HEENT: Eyes - PERRL, no jvd  Heart:  Normal S1/S2 negative S3 or S4. Regular, no murmur, gallop or rub.   Respiratory: Clear bilaterally x 4, no wheezing or rales  Abdomen:   Soft, non-tender, bowel sounds are active.   Extremities:  No edema, normal cap refill, no cyanosis. Musculoskeletal: No clubbing  Neuro: A&Ox3, speech clear, gait stable. Skin: Skin color is normal. No rashes or lesions.  Non diaphoretic  Vascular: 2+ pulses symmetric in all extremities    Cardiographics    Ekg: ventricular pacing  BSC PM: 54%, 16% RVP  No episodes, 9 years battery     Results for orders placed or performed during the hospital encounter of 06/09/16   EKG, 12 LEAD, INITIAL   Result Value Ref Range    Ventricular Rate 62 BPM    Atrial Rate 62 BPM    P-R Interval 260 ms    QRS Duration 80 ms    Q-T Interval 478 ms    QTC Calculation (Bezet) 485 ms    Calculated P Axis 49 degrees    Calculated R Axis 27 degrees    Calculated T Axis 45 degrees    Diagnosis       ** Poor data quality, interpretation may be adversely affected  Sinus rhythm with 1st degree AV block with premature atrial complexes  Nonspecific T wave abnormality Lead V6 is missing   Prolonged QT  When compared with ECG of 09-JUN-2016 13:27,  Previous ECG has undetermined rhythm, needs review  Nonspecific T wave abnormality, improved in Inferior leads  Nonspecific T wave abnormality, improved in Lateral leads  Confirmed by Sumit Quezada ([id number]) on 6/10/2016 9:53:08 AM     Results for orders placed or performed in visit on 05/23/16   CARDIAC HOLTER MONITOR, 24 HOURS    Narrative    ECG Monitor/24 hours, Complete    Reason for Holter Monitor  ECTOPIC ATRIAL RHYTHM    Heartbeat    Slowest 42  Average 81  Fastest  138        Results:   Underlying Rhythm: Normal sinus rhythm      Atrial Arrhythmias: premature atrial contractions; occasional,  paroxysmal atrial fibrillation, paroxysmal atrial flutter and  severe bradycardia            AV Conduction: normal    Ventricular Arrhythmias: premature ventricular contractions;  occasional     ST Segment Analysis:normal     Symptom Correlation:  None reported    Comment:   Sinus rhythm with\ profound daytime bradycardia in the 40s and  pafib/pafl with rvr. Clinical correlation advised. Rickey Valdez MD, [facility]            Lab Results   Component Value Date/Time    WBC 3.5 03/20/2017 12:56 PM    HGB 12.8 03/20/2017 12:56 PM    HCT 38.1 03/20/2017 12:56 PM    PLATELET 355 11/46/6221 12:56 PM    MCV 79 03/20/2017 12:56 PM      Lab Results   Component Value Date/Time    Sodium 140 09/21/2017 11:17 AM    Potassium 4.1 09/21/2017 11:17 AM    Chloride 100 09/21/2017 11:17 AM    CO2 25 09/21/2017 11:17 AM    Anion gap 7 06/10/2016 05:10 AM    Glucose 88 09/21/2017 11:17 AM    BUN 13 09/21/2017 11:17 AM    Creatinine 0.87 09/21/2017 11:17 AM    BUN/Creatinine ratio 15 09/21/2017 11:17 AM    GFR est AA 76 09/21/2017 11:17 AM    GFR est non-AA 66 09/21/2017 11:17 AM    Calcium 9.0 09/21/2017 11:17 AM    Bilirubin, total 0.5 09/21/2017 11:17 AM    AST (SGOT) 20 09/21/2017 11:17 AM    Alk.  phosphatase 94 09/21/2017 11:17 AM    Protein, total 7.3 09/21/2017 11:17 AM    Albumin 4.1 09/21/2017 11:17 AM    Globulin 4.0 05/03/2011 04:50 AM    A-G Ratio 1.3 09/21/2017 11:17 AM    ALT (SGPT) 12 09/21/2017 11:17 AM         Assessment:     Assessment:        ICD-10-CM ICD-9-CM    1. SSS (sick sinus syndrome) ([facility]) I49.5 427.81 AMB POC EKG ROUTINE W/ 12 LEADS, INTER & REP   2. PAF (paroxysmal atrial fibrillation) ([facility]) I48.0 427.31    3. Essential hypertension I10 401.9    4. Coronary artery disease involving coronary bypass graft of native heart without angina pectoris I25.810 414.05      Orders Placed This Encounter    AMB POC EKG ROUTINE W/ 12 LEADS, INTER & REP     Order Specific Question:   Reason for Exam:     Answer:   routine        Plan:   Ms. Alissa Zuniga is here for yearly device follow up. She is doing well. EKG demonstrates ventricular pacing and device interrogation reveals normal functioning (54% AP, 16% RVP). Continue current medical therapy and follow up with Dr. Barb aBl in one year. Continue medical management for SSS, HTN, CAD. Thank you for allowing me to participate in Karthik Day 's care.     SRIRAM Sharp MD, [facility]

## 2017-12-20 ENCOUNTER — OFFICE VISIT (OUTPATIENT)
Dept: CARDIOLOGY CLINIC | Age: 72
End: 2017-12-20

## 2017-12-20 VITALS
WEIGHT: 196.8 LBS | DIASTOLIC BLOOD PRESSURE: 76 MMHG | OXYGEN SATURATION: 96 % | BODY MASS INDEX: 36.22 KG/M2 | HEIGHT: 62 IN | HEART RATE: 60 BPM | SYSTOLIC BLOOD PRESSURE: 132 MMHG

## 2017-12-20 DIAGNOSIS — Z95.1 S/P CABG X 4: ICD-10-CM

## 2017-12-20 DIAGNOSIS — I48.0 PAF (PAROXYSMAL ATRIAL FIBRILLATION) (HCC): ICD-10-CM

## 2017-12-20 DIAGNOSIS — R73.03 PREDIABETES: ICD-10-CM

## 2017-12-20 DIAGNOSIS — I49.5 SSS (SICK SINUS SYNDROME) (HCC): ICD-10-CM

## 2017-12-20 DIAGNOSIS — I44.0 FIRST DEGREE AV BLOCK: ICD-10-CM

## 2017-12-20 DIAGNOSIS — I25.9 CHRONIC ISCHEMIC HEART DISEASE: Primary | ICD-10-CM

## 2017-12-20 DIAGNOSIS — Z95.0 S/P CARDIAC PACEMAKER PROCEDURE: ICD-10-CM

## 2017-12-20 DIAGNOSIS — I49.1 APC (ATRIAL PREMATURE CONTRACTIONS): ICD-10-CM

## 2017-12-20 DIAGNOSIS — I10 ESSENTIAL HYPERTENSION: ICD-10-CM

## 2017-12-20 DIAGNOSIS — E78.2 MIXED HYPERLIPIDEMIA: ICD-10-CM

## 2017-12-20 NOTE — PROGRESS NOTES
[facility]   [address] [facility], [address] [place] [address]                                          NEW PATIENT HPI/FOLLOW-UP      NAME:  Joseph Tobias   :   1945   MRN:   [id number]   PCP:  Santos Mojica MD           Subjective: The patient is a 67y.o. year old female with h/o pSVT, HTN, CAD, chronic ischemic heart disease, s/p CABG x 4, Hyperlipidemia, wandering atrial pacemaker, 1st degree AV block, SSS, PAF, s/p PM who returns for a routine follow-up. Since the last visit, patient reports no new symptoms. Follows with device clinic - Dr. Marcial Pineda - who saw her for her annual visit in 10/2017. Denies change in exercise tolerance, chest pain, edema, medication intolerance, palpitations, shortness of breath, PND/orthopnea, wheezing, sputum, syncope, dizziness or light headedness. Doing satisfactorily. Review of Systems  General: Pt denies excessive weight gain or loss. Pt is able to conduct ADL's. Respiratory: Denies shortness of breath, GARZA, wheezing or stridor.   Cardiovascular: Denies precordial pain, palpitations, edema or PND  Gastrointestinal: Denies poor appetite, indigestion, abdominal pain or blood in stool  Peripheral vascular: Denies claudication, leg cramps  Neuropsychiatric: Denies paresthesias,tingling,numbness,anxiety,depression,fatigue  Musculoskeletal: Denies pain,tenderness, soreness,swelling      Past Medical History:   Diagnosis Date    Acquired cyst of kidney     CAD (coronary artery disease)     Cervical disc herniation     Essential hypertension     Hypercholesterolemia     Hypertension     PAF (paroxysmal atrial fibrillation) ([facility] [address] 2016    Paroxysmal supraventricular tachycardia (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 05/23/2016    APC (atrial premature contractions) 05/23/2016    First degree AV block 05/23/2016    SSS (sick sinus syndrome) ([facility] 05/23/2016    Advanced care planning/counseling discussion 03/22/2016    Dyslipidemia 06/12/2014    Bradycardia, sinus 05/10/2013    Urge incontinence     S/P CABG x 4 09/20/2012    Mixed hyperlipidemia 03/20/2012    Chronic ischemic heart disease 03/20/2012    Postsurgical aortocoronary bypass status 03/20/2012    Coronary atherosclerosis of native coronary artery 03/20/2012    Encounter for long-term (current) use of medications 07/28/2010    Cervical disc herniation     Hypertension     CAD (coronary artery disease)     Paroxysmal supraventricular tachycardia (HCC)       Past Surgical History:   Procedure Laterality Date    CABG, ARTERY-VEIN, FOUR      ENDOSCOPY, COLON, DIAGNOSTIC      due 2012 hx proctitis    HX CERVICAL DISKECTOMY      HX CORONARY ARTERY BYPASS GRAFT      HX HYSTERECTOMY      HX ORTHOPAEDIC      benign thumb tumor    HX UROLOGICAL  2011    stent for hydronephrosis     Allergies   Allergen Reactions    Ace Inhibitors Cough    Pcn [Penicillins] Anaphylaxis and Hives     Just got Rocephin in ED, no reactions    Niaspan [Niacin] Unable to Obtain      Family History   Problem Relation Age of Onset    Asthma Mother     Hypertension Father     Stroke Father     Cancer Sister      breast    Diabetes Brother     Heart Disease Sister     Heart Disease Brother       Social History     Social History    Marital status:      Spouse name: N/A    Number of children: N/A    Years of education: N/A     Occupational History    Not on file.      Social History Main Topics    Smoking status: Former Smoker     Packs/day: 0.50     Years: 10.00     Types: Cigarettes Quit date: 6/9/2007    Smokeless tobacco: Never Used    Alcohol use No    Drug use: No    Sexual activity: No     Other Topics Concern    Not on file     Social History Narrative      Current Outpatient Prescriptions   Medication Sig    sotalol (BETAPACE) 80 mg tablet Take 1 Tab by mouth every twelve (12) hours.  simvastatin (ZOCOR) 40 mg tablet TAKE 1 TABLET BY MOUTH EVERY EVENING    losartan-hydroCHLOROthiazide (HYZAAR) 50-12.5 mg per tablet TAKE 1 TABLET BY MOUTH DAILY.  PSYLLIUM HUSK (DAILY FIBER PO) Take  by mouth daily.  FOLIC ACID/MULTIVIT-MIN/LUTEIN (CENTRUM SILVER PO) Take  by mouth daily.  aspirin (ASPIRIN) 325 mg tablet Take 325 mg by mouth daily.  omega-3 fatty acids-vitamin e (FISH OIL) 1,000 mg Cap Take  by mouth daily. No current facility-administered medications for this visit. I have reviewed the MAs notes, vitals, problem list, allergy list, medical history, family medical, social history and medications. Objective:     Physical Exam:     Vitals:    12/20/17 0922   BP: 132/76   Pulse: 60   SpO2: 96%   Weight: 196 lb 12.8 oz (89.3 kg)   Height: 5' 2\" (1.575 m)    Body mass index is 36 kg/(m^2). General: WDWN obese adult woman, in no acute distress. Pleasant affect. HEENT: No carotid bruits, no JVD, trach is midline. Heart:  Normal S1/S2 negative S3 or S4. Regular, no murmur, gallop or rub.   Respiratory: Clear bilaterally, no wheezing or rales  Abdomen:   Soft, non-tender, bowel sounds are active.   Extremities:  No edema, normal cap refill, no cyanosis. Neuro: A&Ox3, speech clear, gait stable. Skin: Skin color is normal. No rashes or lesions. No diaphoresis. Vascular: 2+ pulses symmetric in all extremities      Data Review:       Cardiographics:    EKG interpretation:  Paced rhythm.          Cardiology Labs:    Results for orders placed or performed during the hospital encounter of 06/09/16   EKG, 12 LEAD, INITIAL   Result Value Ref Range Ventricular Rate 62 BPM    Atrial Rate 62 BPM    P-R Interval 260 ms    QRS Duration 80 ms    Q-T Interval 478 ms    QTC Calculation (Bezet) 485 ms    Calculated P Axis 49 degrees    Calculated R Axis 27 degrees    Calculated T Axis 45 degrees    Diagnosis       ** Poor data quality, interpretation may be adversely affected  Sinus rhythm with 1st degree AV block with premature atrial complexes  Nonspecific T wave abnormality Lead V6 is missing   Prolonged QT  When compared with ECG of 09-JUN-2016 13:27,  Previous ECG has undetermined rhythm, needs review  Nonspecific T wave abnormality, improved in Inferior leads  Nonspecific T wave abnormality, improved in Lateral leads  Confirmed by Shanique Mg ([id number]) on 6/10/2016 9:53:08 AM     Results for orders placed or performed in visit on 05/23/16   CARDIAC HOLTER MONITOR, 24 HOURS    Narrative    ECG Monitor/24 hours, Complete    Reason for Holter Monitor  ECTOPIC ATRIAL RHYTHM    Heartbeat    Slowest 42  Average 81  Fastest  138        Results:   Underlying Rhythm: Normal sinus rhythm      Atrial Arrhythmias: premature atrial contractions; occasional,  paroxysmal atrial fibrillation, paroxysmal atrial flutter and  severe bradycardia            AV Conduction: normal    Ventricular Arrhythmias: premature ventricular contractions;  occasional     ST Segment Analysis:normal     Symptom Correlation:  None reported    Comment:   Sinus rhythm with\ profound daytime bradycardia in the 40s and  pafib/pafl with rvr. Clinical correlation advised.      Tiburcio [place] MD, [facility], [address] [place] [address] Results   Component Value Date/Time    Cholesterol, total 132 09/21/2017 11:17 AM    HDL Cholesterol 43 09/21/2017 11:17 AM    LDL, calculated 75 09/21/2017 11:17 AM    Triglyceride 68 09/21/2017 11:17 AM    CHOL/HDL Ratio 3.4 07/28/2010 11:07 AM       Lab Results   Component Value Date/Time    Sodium 140 09/21/2017 11:17 AM    Potassium 4.1 09/21/2017 11:17 AM    Chloride 100 09/21/2017 11:17 AM    CO2 25 09/21/2017 11:17 AM    Anion gap 7 06/10/2016 05:10 AM    Glucose 88 09/21/2017 11:17 AM    BUN 13 09/21/2017 11:17 AM    Creatinine 0.87 09/21/2017 11:17 AM    BUN/Creatinine ratio 15 09/21/2017 11:17 AM    GFR est AA 76 09/21/2017 11:17 AM    GFR est non-AA 66 09/21/2017 11:17 AM    Calcium 9.0 09/21/2017 11:17 AM    Bilirubin, total 0.5 09/21/2017 11:17 AM    AST (SGOT) 20 09/21/2017 11:17 AM    Alk. phosphatase 94 09/21/2017 11:17 AM    Protein, total 7.3 09/21/2017 11:17 AM    Albumin 4.1 09/21/2017 11:17 AM    Globulin 4.0 05/03/2011 04:50 AM    A-G Ratio 1.3 09/21/2017 11:17 AM    ALT (SGPT) 12 09/21/2017 11:17 AM          Assessment:       ICD-10-CM ICD-9-CM    1. Chronic ischemic heart disease I25.9 414.9 AMB POC EKG ROUTINE W/ 12 LEADS, INTER & REP   2. PAF (paroxysmal atrial fibrillation) ([facility]) I48.0 427.31 AMB POC EKG ROUTINE W/ 12 LEADS, INTER & REP   3. APC (atrial premature contractions) I49.1 427.61    4. First degree AV block I44.0 426.11    5. SSS (sick sinus syndrome) ([facility]) I49.5 427.81    6. S/P cardiac pacemaker procedure Z95.0 V45.01    7. Mixed hyperlipidemia E78.2 272.2    8. Essential hypertension I10 401.9    9. S/P CABG x 4 Z95.1 V45.81    10. Prediabetes R73.03 790.29          Discussion: Patient presents at this time stable from a cardiac perspective. BP well controlled. Pleased with present cardiac status. Discussed/seen with Dr. George Liao: 1. Continue same meds. -- Lipid profile and labs followed by PCP--at goal 7/17. 2.Encouraged to exercise to tolerance, lose weight and follow low fat, low cholesterol, low sodium predominantly Plant-based (consider Mediterranean) diet. 3.Follow up: 6 months   --  Call with questions or concerns. I have discussed the diagnosis with the patient and the intended plan as seen in the above orders. The patient has received an after-visit summary and questions were answered concerning future plans.   I have discussed any concerning medication side effects and warnings with the patient as well. Patient seen and examined. All pertinent data reviewed. I have reviewed detailed note as outlined by Arnie Granados. Case discussed with Nursing/medical assistant staff and Arnie Granados. Patient's chronic ischemic heart disease stable. Plans as outlined.       Lennox Hillman PA-C  12/20/2017     [facility]

## 2017-12-20 NOTE — MR AVS SNAPSHOT
Visit Information Date & Time Provider Department Dept. Phone Encounter #  
 12/20/2017  9:15 AM Rudean Fleischer, MD [facility] [place] [address] [id number] Your Appointments 3/22/2018 10:30 AM  
ROUTINE CARE with La Griggs MD  
[facility]) Appt Note: 6m fu  
 [address] [address]  
[facility] [address] [address]  
  
    
 4/3/2018  9:30 AM  
PACEMAKER with PACEMAKER, [facility]) Appt Note: 6mo bsc dcpm  
 [address]  
[phone] [address] Upcoming Health Maintenance Date Due  
 GLAUCOMA SCREENING Q2Y 5/12/2016 COLONOSCOPY 5/1/2017 Influenza Age 5 to Adult 8/1/2017 MEDICARE YEARLY EXAM 3/21/2018 DTaP/Tdap/Td series (2 - Td) 3/20/2027 Allergies as of 12/20/2017  Review Complete On: 12/20/2017 By: Arjun Mcgowan Severity Noted Reaction Type Reactions Ace Inhibitors Medium 06/09/2010   Side Effect Cough Pcn [Penicillins] Medium 06/09/2010   Systemic Anaphylaxis, Hives Just got Rocephin in ED, no reactions Niaspan [Niacin]  03/20/2012    Unable to Obtain Current Immunizations  Reviewed on 9/21/2015 Name Date Influenza High Dose Vaccine PF 12/22/2015 Pneumococcal Polysaccharide (PPSV-23) 3/3/2017, 6/11/2013 Not reviewed this visit You Were Diagnosed With   
  
 Codes Comments PAF (paroxysmal atrial fibrillation) ([facility] [address]    -  Primary ICD-10-CM: I48.0 ICD-9-CM: 427.31   
 APC (atrial premature contractions)     ICD-10-CM: I49.1 ICD-9-CM: 427.61 First degree AV block     ICD-10-CM: I44.0 ICD-9-CM: 426.11   
 SSS (sick sinus syndrome) (HCC)     ICD-10-CM: I49.5 ICD-9-CM: 427.81   
 S/P cardiac pacemaker procedure     ICD-10-CM: Z95.0 ICD-9-CM: V45.01 Chronic ischemic heart disease     ICD-10-CM: I25.9 ICD-9-CM: 414.9 Vitals BP Pulse Height(growth percentile) Weight(growth percentile) LMP SpO2  
 132/76 60 5' 2\" (1.575 m) 196 lb 12.8 oz (89.3 kg) 08/24/1993 96% BMI OB Status Smoking Status 36 kg/m2 Postmenopausal Former Smoker Vitals History BMI and BSA Data Body Mass Index Body Surface Area  
 36 kg/m 2 1.98 m 2 Preferred Pharmacy Pharmacy Name Phone [id number] [phone] [facility] [phone] Your Updated Medication List  
  
   
This list is accurate as of: 12/20/17 10:15 AM.  Always use your most recent med list.  
  
  
  
  
 aspirin 325 mg tablet Commonly known as:  ASPIRIN Take 325 mg by mouth daily. CENTRUM SILVER PO Take  by mouth daily. DAILY FIBER PO Take  by mouth daily. FISH OIL 1,000 mg Cap Generic drug:  omega-3 fatty acids-vitamin e Take  by mouth daily. losartan-hydroCHLOROthiazide 50-12.5 mg per tablet Commonly known as:  HYZAAR  
TAKE 1 TABLET BY MOUTH DAILY. simvastatin 40 mg tablet Commonly known as:  ZOCOR  
TAKE 1 TABLET BY MOUTH EVERY EVENING  
  
 sotalol 80 mg tablet Commonly known as:  Ami Ni Take 1 Tab by mouth every twelve (12) hours. We Performed the Following AMB POC EKG ROUTINE W/ 12 LEADS, INTER & REP [54839 CPT(R)] Introducing [facility] & HEALTH SERVICES! Dear Maynor Mishra: 
Thank you for requesting a [employer] account. Our records indicate that you have previously registered for a [employer] account but its currently inactive. Please call our [employer] support line at [phone]. Additional Information If you have questions, please visit the Frequently Asked Questions section of the [employer] website at [email or web address] Remember, [employer] Book[employer] is NOT to be used for urgent needs. For medical emergencies, dial 911. Now available from your iPhone and Android! Please provide this summary of care documentation to your next provider. Your primary care clinician is listed as Lucy Strange. If you have any questions after today's visit, please call [phone].

## 2018-01-04 ENCOUNTER — ANESTHESIA EVENT (OUTPATIENT)
Dept: ENDOSCOPY | Age: 73
End: 2018-01-04
Payer: MEDICARE

## 2018-01-04 NOTE — ANESTHESIA PREPROCEDURE EVALUATION
Anesthetic History   No history of anesthetic complications            Review of Systems / Medical History  Patient summary reviewed, nursing notes reviewed and pertinent labs reviewed    Pulmonary          Smoker (EX, 5 pk yr, quit in 2007)         Neuro/Psych   Within defined limits           Cardiovascular    Hypertension        Dysrhythmias : SVT and atrial fibrillation  Pacemaker (PM 2016    SSS), CAD, CABG and hyperlipidemia    Exercise tolerance: >4 METS  Comments: 12-20-17 EKG: PM with first degree AV blk   GI/Hepatic/Renal               Comments: Screening Endo/Other        Obesity     Other Findings   Comments: Cervical disc herniation         Physical Exam    Airway  Mallampati: II  TM Distance: 4 - 6 cm  Neck ROM: normal range of motion   Mouth opening: Normal     Cardiovascular  Regular rate and rhythm,  S1 and S2 normal,  no murmur, click, rub, or gallop             Dental    Dentition: Full upper dentures and Full lower dentures     Pulmonary  Breath sounds clear to auscultation               Abdominal  GI exam deferred       Other Findings            Anesthetic Plan    ASA: 3  Anesthesia type: total IV anesthesia and general          Induction: Intravenous  Anesthetic plan and risks discussed with: Patient

## 2018-01-05 ENCOUNTER — HOSPITAL ENCOUNTER (OUTPATIENT)
Age: 73
Setting detail: OUTPATIENT SURGERY
Discharge: HOME | End: 2018-01-05
Attending: SPECIALIST | Admitting: SPECIALIST
Payer: MEDICARE

## 2018-01-05 ENCOUNTER — ANESTHESIA (OUTPATIENT)
Dept: ENDOSCOPY | Age: 73
End: 2018-01-05
Payer: MEDICARE

## 2018-01-05 VITALS
OXYGEN SATURATION: 95 % | BODY MASS INDEX: 35.15 KG/M2 | HEIGHT: 63 IN | SYSTOLIC BLOOD PRESSURE: 137 MMHG | TEMPERATURE: 98.2 F | WEIGHT: 198.38 LBS | RESPIRATION RATE: 16 BRPM | DIASTOLIC BLOOD PRESSURE: 67 MMHG | HEART RATE: 62 BPM

## 2018-01-05 PROCEDURE — 77030013992 HC SNR POLYP ENDOSC BSC -B: Performed by: SPECIALIST

## 2018-01-05 PROCEDURE — 74011000250 HC RX REV CODE- 250

## 2018-01-05 PROCEDURE — 76060000032 HC ANESTHESIA 0.5 TO 1 HR: Performed by: SPECIALIST

## 2018-01-05 PROCEDURE — 88305 TISSUE EXAM BY PATHOLOGIST: CPT | Performed by: SPECIALIST

## 2018-01-05 PROCEDURE — 76040000007: Performed by: SPECIALIST

## 2018-01-05 PROCEDURE — 74011250636 HC RX REV CODE- 250/636: Performed by: SPECIALIST

## 2018-01-05 PROCEDURE — 74011250636 HC RX REV CODE- 250/636

## 2018-01-05 RX ORDER — SODIUM CHLORIDE 0.9 % (FLUSH) 0.9 %
5-10 SYRINGE (ML) INJECTION AS NEEDED
Status: DISCONTINUED | OUTPATIENT
Start: 2018-01-05 | End: 2018-01-05 | Stop reason: HOSPADM

## 2018-01-05 RX ORDER — DEXTROMETHORPHAN/PSEUDOEPHED 2.5-7.5/.8
1.2 DROPS ORAL
Status: DISCONTINUED | OUTPATIENT
Start: 2018-01-05 | End: 2018-01-05 | Stop reason: HOSPADM

## 2018-01-05 RX ORDER — PROPOFOL 10 MG/ML
INJECTION, EMULSION INTRAVENOUS AS NEEDED
Status: DISCONTINUED | OUTPATIENT
Start: 2018-01-05 | End: 2018-01-05 | Stop reason: HOSPADM

## 2018-01-05 RX ORDER — LIDOCAINE HYDROCHLORIDE 20 MG/ML
INJECTION, SOLUTION EPIDURAL; INFILTRATION; INTRACAUDAL; PERINEURAL AS NEEDED
Status: DISCONTINUED | OUTPATIENT
Start: 2018-01-05 | End: 2018-01-05 | Stop reason: HOSPADM

## 2018-01-05 RX ORDER — MIDAZOLAM HYDROCHLORIDE 1 MG/ML
1-2 INJECTION, SOLUTION INTRAMUSCULAR; INTRAVENOUS
Status: DISCONTINUED | OUTPATIENT
Start: 2018-01-05 | End: 2018-01-05 | Stop reason: HOSPADM

## 2018-01-05 RX ORDER — SODIUM CHLORIDE 0.9 % (FLUSH) 0.9 %
5-10 SYRINGE (ML) INJECTION EVERY 8 HOURS
Status: DISCONTINUED | OUTPATIENT
Start: 2018-01-05 | End: 2018-01-05 | Stop reason: HOSPADM

## 2018-01-05 RX ORDER — SODIUM CHLORIDE, SODIUM LACTATE, POTASSIUM CHLORIDE, CALCIUM CHLORIDE 600; 310; 30; 20 MG/100ML; MG/100ML; MG/100ML; MG/100ML
50 INJECTION, SOLUTION INTRAVENOUS CONTINUOUS
Status: DISCONTINUED | OUTPATIENT
Start: 2018-01-05 | End: 2018-01-05 | Stop reason: HOSPADM

## 2018-01-05 RX ORDER — SODIUM CHLORIDE 9 MG/ML
50 INJECTION, SOLUTION INTRAVENOUS CONTINUOUS
Status: DISCONTINUED | OUTPATIENT
Start: 2018-01-05 | End: 2018-01-05 | Stop reason: HOSPADM

## 2018-01-05 RX ADMIN — SODIUM CHLORIDE, SODIUM LACTATE, POTASSIUM CHLORIDE, AND CALCIUM CHLORIDE 50 ML/HR: 600; 310; 30; 20 INJECTION, SOLUTION INTRAVENOUS at 07:37

## 2018-01-05 RX ADMIN — LIDOCAINE HYDROCHLORIDE 40 MG: 20 INJECTION, SOLUTION EPIDURAL; INFILTRATION; INTRACAUDAL; PERINEURAL at 07:46

## 2018-01-05 RX ADMIN — PROPOFOL 240 MG: 10 INJECTION, EMULSION INTRAVENOUS at 08:06

## 2018-01-05 NOTE — PROCEDURES
Colonoscopy Procedure Note    Indications:   Screening colonoscopy    Referring Physician: Raciel Francois MD  Anesthesia/Sedation: MAC anesthesia Propofol  Endoscopist:  Dr. Lulú Linn    Procedure in Detail:  Informed consent was obtained for the procedure, including sedation. Risks of perforation, hemorrhage, adverse drug reaction, and aspiration were discussed. The patient was placed in the left lateral decubitus position. Based on the pre-procedure assessment, including review of the patient's medical history, medications, allergies, and review of systems, she had been deemed to be an appropriate candidate for moderate sedation; she was therefore sedated with the medications listed above. The patient was monitored continuously with ECG tracing, pulse oximetry, blood pressure monitoring, and direct observations. A rectal examination was performed. The [id number] was inserted into the rectum and advanced under direct vision to the cecum, which was identified by the ileocecal valve and appendiceal orifice. The quality of the colonic preparation was adequate. A careful inspection was made as the colonoscope was withdrawn, including a retroflexed view of the rectum; findings and interventions are described below. Appropriate photodocumentation was obtained. Findings:     1. Scope advanced to the cecum and terminal ileum. 2.  4 mm polyp in the sigmoid colon s/p cold snare removal.  3.  Mucosa normal throughout. 4.  Grade 2 internal hemorrhoids. Therapies:  As above    Specimen: Specimens were collected as described above and sent to pathology. Complications: None were encountered during the procedure. EBL: < 10 ml.     Recommendations:   -f/u path to determine next appropriate colonoscopy interval    Signed By: Zacarias Kessler MD                        January 5, 2018

## 2018-01-05 NOTE — IP AVS SNAPSHOT
[address] [facility] 
[phone] Patient: Dom Gomez MRN: [id number] USZ:[birth year] About your hospitalization You were admitted on:  January 5, 2018 You last received care in the:  [facility] ENDOSCOPY You were discharged on:  January 5, 2018 Why you were hospitalized Your primary diagnosis was:  Not on File Follow-up Information None Discharge Orders None A check josias indicates which time of day the medication should be taken. My Medications CONTINUE taking these medications Instructions Each Dose to Equal  
 Morning Noon Evening Bedtime  
 aspirin 325 mg tablet Commonly known as:  ASPIRIN Your last dose was: Your next dose is: Take 325 mg by mouth daily. 325 mg CENTRUM SILVER PO Your last dose was: Your next dose is: Take  by mouth daily. DAILY FIBER PO Your last dose was: Your next dose is: Take  by mouth daily. FISH OIL 1,000 mg Cap Generic drug:  omega-3 fatty acids-vitamin e Your last dose was: Your next dose is: Take  by mouth daily. losartan-hydroCHLOROthiazide 50-12.5 mg per tablet Commonly known as:  HYZAAR Your last dose was: Your next dose is: TAKE 1 TABLET BY MOUTH DAILY. simvastatin 40 mg tablet Commonly known as:  ZOCOR Your last dose was: Your next dose is: TAKE 1 TABLET BY MOUTH EVERY EVENING  
     
   
   
   
  
 sotalol 80 mg tablet Commonly known as:  Vickie Carpio Your last dose was: Your next dose is: Take 1 Tab by mouth every twelve (12) hours. 80 mg Discharge Instructions Dom Gomez [id number] 1945 COLON DISCHARGE INSTRUCTIONS Discomfort: 
Redness at IV site- apply warm compress to area; if redness or soreness persist- contact your physician There may be a slight amount of blood passed from the rectum Gaseous discomfort- walking, belching will help relieve any discomfort You may not operate a vehicle for 12 hours You may not engage in an occupation involving machinery or appliances for rest of today You may not drink alcoholic beverages for at least 12 hours Avoid making any critical decisions for at least 24 hour DIET: 
 Regular diet.  however -  remember your colon is empty and a heavy meal will produce gas. Avoid these foods:  vegetables, fried / greasy foods, carbonated drinks for today. MEDICATIONS: 
  
 
 Regarding Aspirin or Nonsteroidal medications, please see below. ACTIVITY: 
You may resume your normal daily activities it is recommended that you spend the remainder of the day resting -  avoid any strenuous activity. CALL M.D. ANY SIGN OF: Increasing pain, nausea, vomiting Abdominal distension (swelling) New increased bleeding (oral or rectal) Fever (chills) Tylenol as needed for pain. Follow-up Instructions: 
 Call Dr. Sukh Tyler for results of procedure / biopsy in 4-5 days at telephone #  [phone] [employer] Activation Thank you for requesting access to [employer]. Please follow the instructions below to securely access and download your online medical record. [employer] allows you to send messages to your doctor, view your test results, renew your prescriptions, schedule appointments, and more. How Do I Sign Up? 1. In your internet browser, go to [email or web address] 
2. Click on the First Time User? Click Here link in the Sign In box. You will be redirect to the New Member Sign Up page. 3. Enter your [employer] Access Code exactly as it appears below. You will not need to use this code after youve completed the sign-up process.  If you do not sign up before the expiration date, you must request a new code. [employer] Access Code: [email or web address] Expires: 2018  8:15 AM (This is the date your [employer] access code will ) 4. Enter the last four digits of your Social Security Number (xxxx) and Date of Birth (mm/dd/yyyy) as indicated and click Submit. You will be taken to the next sign-up page. 5. Create a [employer] ID. This will be your [employer] login ID and cannot be changed, so think of one that is secure and easy to remember. 6. Create a [employer] password. You can change your password at any time. 7. Enter your Password Reset Question and Answer. This can be used at a later time if you forget your password. 8. Enter your e-mail address. You will receive e-mail notification when new information is available in [address] 9. Click Sign Up. You can now view and download portions of your medical record. 10. Click the Download Summary menu link to download a portable copy of your medical information. Additional Information If you have questions, please visit the Frequently Asked Questions section of the [employer] website at [email or web address] Remember, [employer] is NOT to be used for urgent needs. For medical emergencies, dial 911. Introducing [facility] & HEALTH SERVICES! Carmelita Fothergill introduces [employer] patient portal. Now you can access parts of your medical record, email your doctor's office, and request medication refills online. 1. In your internet browser, go to [email or web address] 2. Click on the First Time User? Click Here link in the Sign In box. You will see the New Member Sign Up page. 3. Enter your [employer] Access Code exactly as it appears below. You will not need to use this code after youve completed the sign-up process. If you do not sign up before the expiration date, you must request a new code. · [employer] Access Code: [email or web address] Expires: 4/5/2018  8:15 AM 
 
4. Enter the last four digits of your Social Security Number (xxxx) and Date of Birth (mm/dd/yyyy) as indicated and click Submit. You will be taken to the next sign-up page. 5. Create a [employer] ID. This will be your [employer] login ID and cannot be changed, so think of one that is secure and easy to remember. 6. Create a [employer] password. You can change your password at any time. 7. Enter your Password Reset Question and Answer. This can be used at a later time if you forget your password. 8. Enter your e-mail address. You will receive e-mail notification when new information is available in [address] 9. Click Sign Up. You can now view and download portions of your medical record. 10. Click the Download Summary menu link to download a portable copy of your medical information. If you have questions, please visit the Frequently Asked Questions section of the [employer] website. Remember, [employer] is NOT to be used for urgent needs. For medical emergencies, dial 911. Now available from your iPhone and Android! Providers Seen During Your Hospitalization Provider Specialty Primary office phone Dipesh Marcano MD Gastroenterology [phone] Your Primary Care Physician (PCP) Primary Care Physician Office Phone Office Fax Mario Weston [phone] You are allergic to the following Allergen Reactions Ace Inhibitors Cough Pcn (Penicillins) Anaphylaxis Hives Just got Rocephin in ED, no reactions Niaspan (Niacin) Unable to Obtain Recent Documentation Height Weight BMI OB Status Smoking Status 1.6 m 90 kg 35.14 kg/m2 Postmenopausal Former Smoker Emergency Contacts Name Discharge Info Relation Home Work Mobile Chioma Perry DISCHARGE CAREGIVER [3] Child [2] [phone] Matias Mcgowan  Child [2] [phone] Radha Leon 89 CAREGIVER [3] Daughter [21] [phone] Patient Belongings The following personal items are in your possession at time of discharge: 
  Dental Appliances: Lowers, Uppers  Visual Aid: Glasses Please provide this summary of care documentation to your next provider. Signatures-by signing, you are acknowledging that this After Visit Summary has been reviewed with you and you have received a copy. Patient Signature:  ____________________________________________________________ Date:  ____________________________________________________________  
  
[facility] Provider Signature:  ____________________________________________________________ Date:  ____________________________________________________________

## 2018-01-05 NOTE — ANESTHESIA POSTPROCEDURE EVALUATION
Post-Anesthesia Evaluation and Assessment    Patient: Aron Noel MRN: [de-identified]  SSN: [id number]    YOB: 1945  Age: 67 y.o. Sex: female       Cardiovascular Function/Vital Signs  Visit Vitals    /62    Pulse 62    Temp 36.8 °C (98.2 °F)    Resp 16    Ht 5' 3\" (1.6 m)    Wt 90 kg (198 lb 6 oz)    SpO2 98%    BMI 35.14 kg/m2       Patient is status post total IV anesthesia, general anesthesia for Procedure(s):  COLONOSCOPY  ENDOSCOPIC POLYPECTOMY. Nausea/Vomiting: None    Postoperative hydration reviewed and adequate. Pain:  Pain Scale 1: Numeric (0 - 10) (01/05/18 0830)  Pain Intensity 1: 0 (01/05/18 0830)   Managed    Neurological Status: At baseline    Mental Status and Level of Consciousness: Arousable    Pulmonary Status:   O2 Device: Room air (01/05/18 0830)   Adequate oxygenation and airway patent    Complications related to anesthesia: None    Post-anesthesia assessment completed.  No concerns    Signed By: Marni Parks MD     January 5, 2018

## 2018-01-05 NOTE — H&P
Gastroenterology Outpatient History and Physical    Patient: Brandin Rodríguez    Physician: Moy Oviedo MD    Vital Signs: Blood pressure 149/70, pulse 62, temperature 97.8 °F (36.6 °C), resp. rate 12, height 5' 3\" (1.6 m), weight 90 kg (198 lb 6 oz), last menstrual period 08/24/1993, SpO2 100 %. Allergies: Allergies   Allergen Reactions    Ace Inhibitors Cough    Pcn [Penicillins] Anaphylaxis and Hives     Just got Rocephin in ED, no reactions    Niaspan [Niacin] Unable to Obtain       Chief Complaint: CRC screening    History of Present Illness: 66 yo BF for screening colonoscopy.     Justification for Procedure: above    History:  Past Medical History:   Diagnosis Date    Acquired cyst of kidney     CAD (coronary artery disease)     Cervical disc herniation     Essential hypertension     Hypercholesterolemia     Hypertension     [facility]oxysmal [facility] fibrillation[facility] 6/9/2016    Paroxysmal supraventricular tachycardia ([address]     Postsurgical aortocoronary bypass status 3/20/2012    S/P cardiac pacemaker procedure 6/9/2016 6/9/16 [employer] dual chamber pacemaker implant    Urge incontinence       Past Surgical History:   Procedure Laterality Date    CABG, ARTERY-VEIN, FOUR  2007    ENDOSCOPY, COLON, DIAGNOSTIC      due 2012 hx proctitis    HX CERVICAL DISKECTOMY      HX CORONARY ARTERY BYPASS GRAFT      HX HEENT      Thyroidectomy    HX ORTHOPAEDIC      benign thumb tumor    HX PACEMAKER Left 2016    HX UROLOGICAL  2011    stent for hydronephrosis      Social History     Social History    Marital status:      Spouse name: N/A    Number of children: N/A    Years of education: N/A     Social History Main Topics    Smoking status: Former Smoker     Packs/day: 0.50     Years: 10.00     Types: Cigarettes     Quit date: 6/9/2007    Smokeless tobacco: Never Used    Alcohol use No    Drug use: No    Sexual activity: No     Other Topics Concern    None Social History Narrative      Family History   Problem Relation Age of Onset    Asthma Mother     Hypertension Father     Stroke Father     Cancer Sister      breast    Diabetes Brother     Heart Disease Sister     Heart Disease Brother        Medications:   Prior to Admission medications    Medication Sig Start Date End Date Taking? Authorizing Provider   sotalol (BETAPACE) 80 mg tablet Take 1 Tab by mouth every twelve (12) hours. 5/9/17  Yes Pam Enriquez PA-C   simvastatin (ZOCOR) 40 mg tablet TAKE 1 TABLET BY MOUTH EVERY EVENING 3/20/17  Yes Raul Simon MD   losartan-hydroCHLOROthiazide (HYZAAR) 50-12.5 mg per tablet TAKE 1 TABLET BY MOUTH DAILY. 3/20/17  Yes Raul Simon MD   PSYLLIUM HUSK (DAILY FIBER PO) Take  by mouth daily. Yes Historical Provider   FOLIC ACID/MULTIVIT-MIN/LUTEIN (CENTRUM SILVER PO) Take  by mouth daily. Yes Historical Provider   aspirin (ASPIRIN) 325 mg tablet Take 325 mg by mouth daily. Yes Historical Provider   omega-3 fatty acids-vitamin e (FISH OIL) 1,000 mg Cap Take  by mouth daily. Yes Historical Provider       Physical Exam:   General: alert, no distress   HEENT: Head: Normocephalic, no lesions, without obvious abnormality.    Heart: regular rate and rhythm, S1, S2 normal, no murmur, click, rub or gallop   Lungs: chest clear, no wheezing, rales, normal symmetric air entry   Abdominal: soft, nontender, nondistended, +BS   Neurological: Grossly normal   Extremities: extremities normal, atraumatic, no cyanosis or edema     Findings/Diagnosis: CRC Screening    Plan of Care/Planned Procedure: Colonoscopy with MAC    Signed By: Manuel Ledezma MD     January 5, 2018

## 2018-01-05 NOTE — PROGRESS NOTES
Brandin Rodríguez  1945  [de-identified]    Situation:  Verbal report received from: dariusz griffith rn  Procedure: Procedure(s):  COLONOSCOPY  ENDOSCOPIC POLYPECTOMY    Background:    Preoperative diagnosis: SCREENING  Postoperative diagnosis:   colon polyp,  internal hemorrhoids    :  Dr. Sudha Miller  Assistant(s): Endoscopy Technician-1: Cornelio Butler  Endoscopy RN-1: Shaun Crow    Specimens:   ID Type Source Tests Collected by Time Destination   1 : polyp Preservative Sigmoid  Moy Oviedo MD 1/5/2018 0801 Pathology     H. Pylori  no    Assessment:  Anesthesia gave intra-procedure sedation and medications, see anesthesia flow sheet yes    Intravenous fluids: NS@ KVO     Vital signs stable  yes    Abdominal assessment: round and soft  yes    Recommendation:  Discharge patient per MD order yes.   Family or Friend  yes  Permission to share finding with family or friend yes

## 2018-01-05 NOTE — PERIOP NOTES
Anesthesia reports 240 mg Propofol, 40 mg Lidocaine and 200 mL NS given during procedure. Received report from anesthesia staff on vital signs and status of patient. Glasses returned to pt.

## 2018-01-05 NOTE — DISCHARGE INSTRUCTIONS
Reyes Askew  [de-identified]  1945    COLON DISCHARGE INSTRUCTIONS  Discomfort:  Redness at IV site- apply warm compress to area; if redness or soreness persist- contact your physician  There may be a slight amount of blood passed from the rectum  Gaseous discomfort- walking, belching will help relieve any discomfort  You may not operate a vehicle for 12 hours  You may not engage in an occupation involving machinery or appliances for rest of today  You may not drink alcoholic beverages for at least 12 hours  Avoid making any critical decisions for at least 24 hour  DIET:   Regular diet. - however -  remember your colon is empty and a heavy meal will produce gas. Avoid these foods:  vegetables, fried / greasy foods, carbonated drinks for today. MEDICATIONS:        Regarding Aspirin or Nonsteroidal medications, please see below. ACTIVITY:  You may resume your normal daily activities it is recommended that you spend the remainder of the day resting -  avoid any strenuous activity. CALL M.D. ANY SIGN OF:  Increasing pain, nausea, vomiting  Abdominal distension (swelling)  New increased bleeding (oral or rectal)  Fever (chills)  Tylenol as needed for pain. Follow-up Instructions:   Call Dr. Jabari Tyler for results of procedure / biopsy in 4-5 days at telephone #  [phone] Activation    Thank you for requesting access to [address] Please follow the instructions below to securely access and download your online medical record. [employer] allows you to send messages to your doctor, view your test results, renew your prescriptions, schedule appointments, and more. How Do I Sign Up? 1. In your internet browser, go to [email or web address]  2. Click on the First Time User? Click Here link in the Sign In box. You will be redirect to the New Member Sign Up page. 3. Enter your [employer] Access Code exactly as it appears below.  You will not need to use this code after youve completed the sign-up process. If you do not sign up before the expiration date, you must request a new code. [employer] Access Code: [email or web address]  Expires: 2018  8:15 AM (This is the date your [employer] access code will )    4. Enter the last four digits of your Social Security Number (xxxx) and Date of Birth (mm/dd/yyyy) as indicated and click Submit. You will be taken to the next sign-up page. 5. Create a [employer] ID. This will be your [employer] login ID and cannot be changed, so think of one that is secure and easy to remember. 6. Create a [employer] password. You can change your password at any time. 7. Enter your Password Reset Question and Answer. This can be used at a later time if you forget your password. 8. Enter your e-mail address. You will receive e-mail notification when new information is available in [address]. 9. Click Sign Up. You can now view and download portions of your medical record. 10. Click the Download Summary menu link to download a portable copy of your medical information. Additional Information    If you have questions, please visit the Frequently Asked Questions section of the [employer] website at [email or web address] Remember, [employer] is NOT to be used for urgent needs. For medical emergencies, dial 911.

## 2018-03-22 ENCOUNTER — OFFICE VISIT (OUTPATIENT)
Dept: FAMILY MEDICINE CLINIC | Age: 73
End: 2018-03-22

## 2018-03-22 VITALS
TEMPERATURE: 97.5 F | DIASTOLIC BLOOD PRESSURE: 73 MMHG | OXYGEN SATURATION: 98 % | HEIGHT: 62 IN | SYSTOLIC BLOOD PRESSURE: 132 MMHG | RESPIRATION RATE: 16 BRPM | HEART RATE: 60 BPM | BODY MASS INDEX: 35.66 KG/M2 | WEIGHT: 193.8 LBS

## 2018-03-22 DIAGNOSIS — Z71.89 ADVANCED DIRECTIVES, COUNSELING/DISCUSSION: ICD-10-CM

## 2018-03-22 DIAGNOSIS — I48.0 PAF (PAROXYSMAL ATRIAL FIBRILLATION) (HCC): ICD-10-CM

## 2018-03-22 DIAGNOSIS — I25.9 CHRONIC ISCHEMIC HEART DISEASE: ICD-10-CM

## 2018-03-22 DIAGNOSIS — I10 ESSENTIAL HYPERTENSION: ICD-10-CM

## 2018-03-22 DIAGNOSIS — M81.0 POSTMENOPAUSAL BONE LOSS: ICD-10-CM

## 2018-03-22 DIAGNOSIS — Z95.1 POSTSURGICAL AORTOCORONARY BYPASS STATUS: ICD-10-CM

## 2018-03-22 DIAGNOSIS — Z13.31 SCREENING FOR DEPRESSION: ICD-10-CM

## 2018-03-22 DIAGNOSIS — I25.10 ATHEROSCLEROSIS OF NATIVE CORONARY ARTERY OF NATIVE HEART WITHOUT ANGINA PECTORIS: ICD-10-CM

## 2018-03-22 DIAGNOSIS — I49.5 SSS (SICK SINUS SYNDROME) (HCC): ICD-10-CM

## 2018-03-22 DIAGNOSIS — E78.2 MIXED HYPERLIPIDEMIA: ICD-10-CM

## 2018-03-22 DIAGNOSIS — I25.810 CORONARY ARTERY DISEASE INVOLVING CORONARY BYPASS GRAFT OF NATIVE HEART WITHOUT ANGINA PECTORIS: ICD-10-CM

## 2018-03-22 DIAGNOSIS — Z79.899 ENCOUNTER FOR LONG-TERM (CURRENT) USE OF MEDICATIONS: ICD-10-CM

## 2018-03-22 DIAGNOSIS — Z13.39 SCREENING FOR ALCOHOLISM: ICD-10-CM

## 2018-03-22 DIAGNOSIS — Z95.0 S/P CARDIAC PACEMAKER PROCEDURE: ICD-10-CM

## 2018-03-22 DIAGNOSIS — R73.03 PREDIABETES: ICD-10-CM

## 2018-03-22 DIAGNOSIS — Z95.1 S/P CABG X 4: ICD-10-CM

## 2018-03-22 DIAGNOSIS — Z00.00 MEDICARE ANNUAL WELLNESS VISIT, SUBSEQUENT: Primary | ICD-10-CM

## 2018-03-22 NOTE — PATIENT INSTRUCTIONS

## 2018-03-22 NOTE — PROGRESS NOTES
Chief Complaint   Patient presents with   [facility] Annual Wellness Visit     (Medicare)   1. Have you been to the ER, urgent care clinic since your last visit? Hospitalized since your last visit? No    2. Have you seen or consulted any other health care providers outside of the [address] [place] [address] since your last visit? Include any pap smears or colon screening.  No   Room #7

## 2018-03-22 NOTE — ASSESSMENT & PLAN NOTE
This condition is managed by Specialist.  Key CAD CHF Meds             sotalol (BETAPACE) 80 mg tablet  (Taking) Take 1 Tab by mouth every twelve (12) hours. simvastatin (ZOCOR) 40 mg tablet  (Taking) TAKE 1 TABLET BY MOUTH EVERY EVENING    losartan-hydroCHLOROthiazide (HYZAAR) 50-12.5 mg per tablet  (Taking) TAKE 1 TABLET BY MOUTH DAILY. aspirin (ASPIRIN) 325 mg tablet  (Taking) Take 325 mg by mouth daily. omega-3 fatty acids-vitamin e (FISH OIL) 1,000 mg Cap  (Taking) Take  by mouth daily.         [id number](BNP,BNPP,BNPPPOC,HSCRP,NA,NAPOC,K,KPOCT,CHOL,CHOLPOCT,HDL,HDLPOC,LDLCHOL,LDLPOCT,LDLCPOC,LDLC,LDL,LDLCEXT,TRIGL,TGLPOCT,INR,INREXT,INRPOC,PTP,PTINR,PTEXT,DIG)@

## 2018-03-22 NOTE — MR AVS SNAPSHOT
[address] [facility] [address] [facility] 
[phone] Patient: Ranjith Aj MRN: [id number] BNE:[birth year] Visit Information Date & Time Provider Department Dept. Phone Encounter #  
 3/22/2018 10:30 AM Katherine Lainez MD [facility] at [address] [id number] Follow-up Instructions Return in about 6 months (around 9/22/2018), or if symptoms worsen or fail to improve, for Regular Follow up. Your Appointments 4/3/2018  9:30 AM  
PACEMAKER with PACEMAKER, [facility]) Appt Note: 6mo bsc dcpm  
 [address] [facility]  
[phone] [address] [facility]  
  
    
 6/20/2018  9:15 AM  
ESTABLISHED PATIENT with Fiordaliza Welsh MD  
[facility]) Appt Note: 6 MO. F/U  
 [address] [facility]  
[phone] [address] Upcoming Health Maintenance Date Due Bone Densitometry (Dexa) Screening 7/3/2009 GLAUCOMA SCREENING Q2Y 5/12/2016 MEDICARE YEARLY EXAM 3/21/2018 BREAST CANCER SCRN MAMMOGRAM 6/3/2018 Influenza Age 5 to Adult 9/24/2018* DTaP/Tdap/Td series (2 - Td) 3/20/2027 COLONOSCOPY 1/5/2028 *Topic was postponed. The date shown is not the original due date. Allergies as of 3/22/2018  Review Complete On: 3/22/2018 By: Katherine Lainez MD  
  
 Severity Noted Reaction Type Reactions Ace Inhibitors Medium 06/09/2010   Side Effect Cough Pcn [Penicillins] Medium 06/09/2010   Systemic Anaphylaxis, Hives Just got Rocephin in ED, no reactions Niaspan [Niacin]  03/20/2012    Unable to Obtain Current Immunizations  Reviewed on 9/21/2015 Name Date Influenza High Dose Vaccine PF 12/22/2015 Pneumococcal Polysaccharide (PPSV-23) 3/3/2017, 6/11/2013 Not reviewed this visit You Were Diagnosed With   
  
 Codes Comments Medicare annual wellness visit, subsequent    -  Primary ICD-10-CM: Z00.00 ICD-9-CM: V70.0 Advanced directives, counseling/discussion     ICD-10-CM: Z71.89 ICD-9-CM: V65.49 Screening for alcoholism     ICD-10-CM: Z13.89 ICD-9-CM: V79.1 Screening for depression     ICD-10-CM: Z13.89 ICD-9-CM: V79.0   
 PAF (paroxysmal atrial fibrillation) (HCC)     ICD-10-CM: I48.0 ICD-9-CM: 427.31   
 SSS (sick sinus syndrome) (HCC)     ICD-10-CM: I49.5 ICD-9-CM: 427.81 Essential hypertension     ICD-10-CM: I10 
ICD-9-CM: 401.9 Coronary artery disease involving coronary bypass graft of native heart without angina pectoris     ICD-10-CM: I25.810 ICD-9-CM: 414.05 Mixed hyperlipidemia     ICD-10-CM: E78.2 ICD-9-CM: 272.2 Chronic ischemic heart disease     ICD-10-CM: I25.9 ICD-9-CM: 414.9 Postsurgical aortocoronary bypass status     ICD-10-CM: Z95.1 ICD-9-CM: V45.81 Atherosclerosis of native coronary artery of native heart without angina pectoris     ICD-10-CM: I25.10 ICD-9-CM: 414.01 S/P CABG x 4     ICD-10-CM: Z95.1 ICD-9-CM: V45.81 S/P cardiac pacemaker procedure     ICD-10-CM: Z95.0 ICD-9-CM: V45.01 Prediabetes     ICD-10-CM: R73.03 
ICD-9-CM: 790.29 Encounter for long-term (current) use of medications     ICD-10-CM: Z79.899 ICD-9-CM: V58.69 Postmenopausal bone loss     ICD-10-CM: M81.0 ICD-9-CM: 733.01 Vitals BP Pulse Temp Resp Height(growth percentile) Weight(growth percentile) 132/73 (BP 1 Location: Left arm, BP Patient Position: Sitting) 60 97.5 °F (36.4 °C) (Oral) 16 5' 2.44\" (1.586 m) 193 lb 12.8 oz (87.9 kg) LMP SpO2 BMI OB Status Smoking Status 08/24/1993 98% 34.95 kg/m2 Postmenopausal Former Smoker Vitals History BMI and BSA Data Body Mass Index Body Surface Area 34.95 kg/m 2 1.97 m 2 Preferred Pharmacy Pharmacy Name Phone [id number] [place] [address] [facility] [phone] Your Updated Medication List  
  
   
This list is accurate as of 3/22/18 11:08 AM.  Always use your most recent med list.  
  
  
  
  
 aspirin 325 mg tablet Commonly known as:  ASPIRIN Take 325 mg by mouth daily. CENTRUM SILVER PO Take  by mouth daily. DAILY FIBER PO Take  by mouth daily. FISH OIL 1,000 mg Cap Generic drug:  omega-3 fatty acids-vitamin e Take  by mouth daily. losartan-hydroCHLOROthiazide 50-12.5 mg per tablet Commonly known as:  HYZAAR  
TAKE 1 TABLET BY MOUTH DAILY. simvastatin 40 mg tablet Commonly known as:  ZOCOR  
TAKE 1 TABLET BY MOUTH EVERY EVENING  
  
 sotalol 80 mg tablet Commonly known as:  Dom Heidi Take 1 Tab by mouth every twelve (12) hours. We Performed the Following ADVANCE CARE PLANNING FIRST 30 MINS [38609 CPT(R)] CBC W/O DIFF [10241 CPT(R)] [address] [[id number] [facility]] HEMOGLOBIN A1C WITH EAG [84189 CPT(R)] LIPID PANEL [39206 CPT(R)] METABOLIC PANEL, COMPREHENSIVE [58955 CPT(R)] [place] ANNUAL ALCOHOL SCREEN 15 MIN [id number] [facility]] TSH 3RD GENERATION [20087 CPT(R)] Follow-up Instructions Return in about 6 months (around 9/22/2018), or if symptoms worsen or fail to improve, for Regular Follow up. To-Do List   
 03/22/2018 Imaging:  DEXA BONE DENSITY STUDY AXIAL Patient Instructions Medicare Wellness Visit, Female The best way to live healthy is to have a healthy lifestyle by eating a well-balanced diet, exercising regularly, limiting alcohol and stopping smoking. Regular physical exams and screening tests are another way to keep healthy.  Preventive exams provided by your health care provider can find health problems before they become diseases or illnesses. Preventive services including immunizations, screening tests, monitoring and exams can help you take care of your own health. All people over age 72 should have a pneumovax  and and a prevnar shot to prevent pneumonia. These are once in a lifetime unless you and your provider decide differently. All people over 65 should have a yearly flu shot and a tetanus vaccine every 10 years. A bone mass density to screen for osteoporosis or thinning of the bones should be done every 2 years after 65. Screening for diabetes mellitus with a blood sugar test should be done every year. Glaucoma is a disease of the eye due to increased ocular pressure that can lead to blindness and it should be done every year by an eye professional. 
 
Cardiovascular screening tests that check for elevated lipids (fatty part of blood) which can lead to heart disease and strokes should be done every 5 years. Colorectal screening that evaluates for blood or polyps in your colon should be done yearly as a stool test or every five years as a flexible sigmoidoscope or every 10 years as a colonoscopy up to age 76. Breast cancer screening with a mammogram is recommended biennially  for women age 54-69. Screening for cervical cancer with a pap smear and pelvic exam is recommended for women after age 72 years every 2 years up to age 79 or when the provider and patient decide to stop. If there is a history of cervical abnormalities or other increased risk for cancer then the test is recommended yearly. Hepatitis C screening is also recommended for anyone born between [birth year] through [address]. A shingles vaccine is also recommended once in a lifetime after age 61. Your Medicare Wellness Exam is recommended annually. Here is a list of your current Health Maintenance items with a due date: 
Health Maintenance Due Topic Date Due  Bone Mineral Density   07/03/2009  Glaucoma Screening   05/12/2016 [employer] Annual Well Visit  03/21/2018  Breast Cancer Screening  06/03/2018 Introducing [facility]! [facility] introduces icanbuy patient portal. Now you can access parts of your medical record, email your doctor's office, and request medication refills online. 1. In your internet browser, go to [email or web address] 2. Click on the First Time User? Click Here link in the Sign In box. You will see the New Member Sign Up page. 3. Enter your icanbuy Access Code exactly as it appears below. You will not need to use this code after youve completed the sign-up process. If you do not sign up before the expiration date, you must request a new code. · [employer]buy Access Code: [email or web address] Expires: 4/5/2018  9:15 AM 
 
4. Enter the last four digits of your Social Security Number (xxxx) and Date of Birth (mm/dd/yyyy) as indicated and click Submit. You will be taken to the next sign-up page. 5. Create a icanbuy ID. This will be your icanbuy login ID and cannot be changed, so think of one that is secure and easy to remember. 6. Create a icanbuy password. You can change your password at any time. 7. Enter your Password Reset Question and Answer. This can be used at a later time if you forget your password. 8. Enter your e-mail address. You will receive e-mail notification when new information is available in [address]. 9. Click Sign Up. You can now view and download portions of your medical record. 10. Click the Download Summary menu link to download a portable copy of your medical information. If you have questions, please visit the Frequently Asked Questions section of the icanbuy website. Remember, icanbuy is NOT to be used for urgent needs. For medical emergencies, dial 911. Now available from your iPhone and Android! Please provide this summary of care documentation to your next provider. Your primary care clinician is listed as Ginger Harper. If you have any questions after today's visit, please call [phone].

## 2018-03-22 NOTE — PROGRESS NOTES
Subjective:     Chief Complaint   Patient presents with    Annual Wellness Visit     (Medicare)        She  is a 67 y.o. female who presents for evaluation of:    Hyperlipidemia & HTN - also has hx of PSVT and CAD s/p 4vCABG and followed by Dr. Patricia Krueger. Pt is doing well on current meds with no medication side effects noted. BPs at home running 130-150s/80s  No new myalgias, no joint pains, no weakness  No TIA's, no chest pain on exertion, no dyspnea on exertion, no swelling of ankles. Exercising - Minimal, stays active  Dieting - Yes, drinks some sweet tea, but avoids fast foods and sodas.   Smoking - No     Lab Results   Component Value Date/Time    Cholesterol, total 134 03/22/2018 11:34 AM    HDL Cholesterol 44 03/22/2018 11:34 AM    LDL, calculated 75 03/22/2018 11:34 AM    Triglyceride 73 03/22/2018 11:34 AM     ROS  Gen - no fever/chills  Resp - no dyspnea or cough  CV - no chest pain or GARZA  Rest per HPI     Objective:     Vitals:    03/22/18 1027   BP: 132/73   Pulse: 60   Resp: 16   Temp: 97.5 °F (36.4 °C)   TempSrc: Oral   SpO2: 98%   Weight: 193 lb 12.8 oz (87.9 kg)   Height: 5' 2.44\" (1.586 m)     Physical Examination:  General appearance - alert, well appearing, and in no distress  Eyes -sclera anicteric  Neck - supple, no significant adenopathy, no thyromegaly, no bruits  Chest - clear to auscultation, no wheezes, rales or rhonchi, symmetric air entry  Heart - normal rate, regular rhythm, normal S1, S2, no murmurs, rubs, clicks or gallops, + pacemaker  Neurological - alert, oriented, normal speech, no focal findings or movement disorder noted  Extr - trace edema bilat  Skin - central chest surgical scar noted, + lesion on RLE          Past Medical History:   Diagnosis Date    Acquired cyst of kidney     CAD (coronary artery disease)     Cervical disc herniation     Essential hypertension     Hypercholesterolemia     Hypertension     PAF [facility]oxysmal atrial fibrillation) [facility] 6/9/2016    [facility]ox[facility]ricular tachycardia [facility]     Postsurgical aortocoronary bypass status 3/20/2012    S/P cardiac pacemaker procedure 6/9/2016 6/9/16 [employer] dual chamber pacemaker implant    Urge incontinence      Past Surgical History:   Procedure Laterality Date    CABG, ARTERY-VEIN, FOUR  2007    COLONOSCOPY N/A 1/5/2018    COLONOSCOPY performed by Nedra F[facility]ar [facility] at [facility] ENDOSCOPY    ENDOSCOPY, COLON, DIAGNOSTIC      due 2012 hx proctitis    HX CERVICAL DISKECTOMY      HX CORONARY ARTERY BYPASS GRAFT      HX HEENT      Thyroidectomy    HX ORTHOPAEDIC      benign thumb tumor    HX PACEMAKER Left 2016    HX UROLOGICAL  2011    stent for hydronephrosis     Current Outpatient Prescriptions on File Prior to Visit   Medication Sig Dispense Refill    sotalol (BETAPACE) 80 mg tablet Take 1 Tab by mouth every twelve (12) hours. 60 Tab 11    simvastatin (ZOCOR) 40 mg tablet TAKE 1 TABLET BY MOUTH EVERY EVENING 90 Tab 3    PSYLLIUM HUSK (DAILY FIBER PO) Take  by mouth daily.  FOLIC ACID/MULTIVIT-MIN/LUTEIN (CENTRUM SILVER PO) Take  by mouth daily.  aspirin (ASPIRIN) 325 mg tablet Take 325 mg by mouth daily.  omega-3 fatty acids-vitamin e (FISH OIL) 1,000 mg Cap Take  by mouth daily. No current facility-administered medications on file prior to visit. Allergies   Allergen Reactions    Ace Inhibitors Cough    Pcn [Penicillins] Anaphylaxis and Hives     Just got Rocephin in ED, no reactions    Niaspan [Niacin] Unable to Obtain       Assessment/ Plan:   Diagnoses and all orders for this visit:    1. Medicare annual wellness visit, subsequent  2. Advanced directives, counseling/discussion  -     ADVANCE CARE PLANNING FIRST 30 MINS  3. Screening for alcoholism  -     Annual  Alcohol Screen 15 min ()  4. Screening for depression  -     Depression Screen Annual    5. PAF (paroxysmal atrial fibrillation) ([facility]  Assessment & Plan:   This condition is managed by Specialist.  Key CAD CHF Meds             sotalol (BETAPACE) 80 mg tablet  (Taking) Take 1 Tab by mouth every twelve (12) hours. simvastatin (ZOCOR) 40 mg tablet  (Taking) TAKE 1 TABLET BY MOUTH EVERY EVENING    losartan-hydroCHLOROthiazide (HYZAAR) 50-12.5 mg per tablet  (Taking) TAKE 1 TABLET BY MOUTH DAILY. aspirin (ASPIRIN) 325 mg tablet  (Taking) Take 325 mg by mouth daily. omega-3 fatty acids-vitamin e (FISH OIL) 1,000 mg Cap  (Taking) Take  by mouth daily. 6. SSS (sick sinus syndrome) ([facility]  Assessment & Plan: This condition is managed by Specialist.      7. Essential hypertension - controlled  -     METABOLIC PANEL, COMPREHENSIVE    8. Coronary artery disease involving coronary bypass graft of native heart without angina pectoris  -     HEMOGLOBIN A1C WITH EAG  -     LIPID PANEL  -     METABOLIC PANEL, COMPREHENSIVE  -     TSH 3RD GENERATION    9. Mixed hyperlipidemia  -     LIPID PANEL    10. Chronic ischemic heart disease  11. Postsurgical aortocoronary bypass status  12. Atherosclerosis of native coronary artery of native heart without angina pectoris  13. S/P CABG x 4  14. S/P cardiac pacemaker procedure  - Stable, followed by Cardiology and EP  -     LIPID PANEL    15. Prediabetes  -     HEMOGLOBIN A1C WITH EAG    16. Encounter for long-term (current) use of medications  -     CBC W/O DIFF  -     HEMOGLOBIN A1C WITH EAG  -     LIPID PANEL  -     METABOLIC PANEL, COMPREHENSIVE  -     TSH 3RD GENERATION    17. Postmenopausal bone loss  -     DEXA BONE DENSITY STUDY AXIAL; Future    I have discussed the diagnosis with the patient and the intended plan as seen in the above orders. The patient has received an after-visit summary and questions were answered concerning future plans. I have discussed medication side effects and warnings with the patient as well.     Follow-up Disposition:  Return in about 6 months (around 9/22/2018), or if symptoms worsen or fail to improve, for Regular Follow up.    This is the Subsequent Medicare Annual Wellness Exam, performed 12 months or more after the Initial AWV or the last Subsequent AWV    I have reviewed the patient's medical history in detail and updated the computerized patient record. History     Past Medical History:   Diagnosis Date    Acquired cyst of kidney     CAD (coronary artery disease)     Cervical disc herniation     Essential hypertension     Hypercholesterolemia     Hypertension     [facility] 6/9/2016    Paroxysmal supraventricular tachycardia ([address]     Postsurgical aortocoronary bypass status 3/20/2012    S/P cardiac pacemaker procedure 6/9/2016 6/9/16 [employer] dual chamber pacemaker implant    Urge incontinence       Past Surgical History:   Procedure Laterality Date    CABG, ARTERY-VEIN, FOUR  2007    COLONOSCOPY N/A 1/5/2018    COL[facility] performed by [facility] at [facility] ENDOSCOPY    ENDOSCOPY, COLON, DIAGNOSTIC      due 2012 hx proctitis    HX CERVICAL DISKECTOMY      HX CORONARY ARTERY BYPASS GRAFT      HX HEENT      Thyroidectomy    HX ORTHOPAEDIC      benign thumb tumor    HX PACEMAKER Left 2016    HX UROLOGICAL  2011    stent for hydronephrosis     Current Outpatient Prescriptions   Medication Sig Dispense Refill    sotalol (BETAPACE) 80 mg tablet Take 1 Tab by mouth every twelve (12) hours. 60 Tab 11    simvastatin (ZOCOR) 40 mg tablet TAKE 1 TABLET BY MOUTH EVERY EVENING 90 Tab 3    losartan-hydroCHLOROthiazide (HYZAAR) 50-12.5 mg per tablet TAKE 1 TABLET BY MOUTH DAILY. 90 Tab 3    PSYLLIUM HUSK (DAILY FIBER PO) Take  by mouth daily.  FOLIC ACID/MULTIVIT-MIN/LUTEIN (CENTRUM SILVER PO) Take  by mouth daily.  aspirin (ASPIRIN) 325 mg tablet Take 325 mg by mouth daily.  omega-3 fatty acids-vitamin e (FISH OIL) 1,000 mg Cap Take  by mouth daily.        Allergies   Allergen Reactions    Ace Inhibitors Cough    Pcn [Penicillins] Anaphylaxis and Hives     Just got Rocephin in ED, no reactions    Niaspan [Niacin] Unable to Obtain     Family History   Problem Relation Age of Onset    Asthma Mother     Hypertension Father     Stroke Father     Cancer Sister      breast    Diabetes Brother     Heart Disease Sister     Heart Disease Brother      Social History   Substance Use Topics    Smoking status: Former Smoker     Packs/day: 0.50     Years: 10.00     Types: Cigarettes     Quit date: 6/9/2007    Smokeless tobacco: Never Used    Alcohol use No     Patient Active Problem List   Diagnosis Code    Cervical disc herniation M50.20    Hypertension I10    CAD (coronary artery disease) I25.10    Paroxysmal supraventricular tachycardia (HCC) I47.1    Encounter for long-term (current) use of medications Z79.899    Mixed hyperlipidemia E78.2    Chronic ischemic heart disease I25.9    Postsurgical aortocoronary bypass status Z95.1    Coronary atherosclerosis of native coronary artery I25.10    S/P CABG x 4 Z95.1    Urge incontinence N39.41    Bradycardia, sinus R00.1    Dyslipidemia E78.5    Advanced care planning/counseling discussion Z71.89    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 Functional Ability and Level of Safety:   Hearing Loss  Hearing is good. Activities of Daily Living  The home contains: no safety equipment.   Patient does total self care    Fall Risk  Fall Risk Assessment, last 12 mths 3/22/2018 Able to walk? Yes   Fall in past 12 months? No       Abuse Screen  Patient is not abused    Cognitive Screening   Evaluation of Cognitive Function:  Has your family/caregiver stated any concerns about your memory: no  Normal    Patient Care Team   Patient Care Team:  Julieta Olsen MD as PCP - General (Family Practice)  Patrick Pack, RN [facility] as [address] [place] [address]  Zoie Singh MD (Cardiology)  Alonzo Gottron, KEELY as Ambulatory Care Navigator (Cardiology)  Bozena Fernandes MD as Physician (Cardiology)    Assessment/Plan   Education and counseling provided:  Are appropriate based on today's review and evaluation    Diagnoses and all orders for this visit:    1. Medicare annual wellness visit, subsequent    2. Advanced directives, counseling/discussion  -     ADVANCE CARE PLANNING FIRST 30 MINS    3. Screening for alcoholism  -     Annual  Alcohol Screen 15 min ()    4. Screening for depression  -     Depression Screen Annual    5. PAF (paroxysmal atrial fibrillation) ([facility]  Assessment & Plan: This condition is managed by Specialist.  Key CAD CHF Meds             sotalol (BETAPACE) 80 mg tablet  (Taking) Take 1 Tab by mouth every twelve (12) hours. simvastatin (ZOCOR) 40 mg tablet  (Taking) TAKE 1 TABLET BY MOUTH EVERY EVENING    losartan-hydroCHLOROthiazide (HYZAAR) 50-12.5 mg per tablet  (Taking) TAKE 1 TABLET BY MOUTH DAILY. aspirin (ASPIRIN) 325 mg tablet  (Taking) Take 325 mg by mouth daily. omega-3 fatty acids-vitamin e (FISH OIL) 1,000 mg Cap  (Taking) Take  by mouth daily. [id number](BNP,BNPP,BNPPPOC,HSCRP,NA,NAPOC,K,KPOCT,CHOL,CHOLPOCT,HDL,HDLPOC,LDLCHOL,LDLPOCT,LDLCPOC,LDLC,LDL,LDLCEXT,TRIGL,TGLPOCT,INR,INREXT,INRPOC,PTP,PTINR,PTEXT,DIG)@      6. SSS (sick sinus syndrome) ([facility]  Assessment & Plan: This condition is managed by Specialist.      7. Essential hypertension  -     METABOLIC PANEL, COMPREHENSIVE    8.  Coronary artery disease involving coronary bypass graft of native heart without angina pectoris  -     HEMOGLOBIN A1C WITH EAG  -     LIPID PANEL  -     METABOLIC PANEL, COMPREHENSIVE  -     TSH 3RD GENERATION    9. Mixed hyperlipidemia  -     LIPID PANEL    10. Chronic ischemic heart disease  -     LIPID PANEL    11. Postsurgical aortocoronary bypass status  -     LIPID PANEL    12. Atherosclerosis of native coronary artery of native heart without angina pectoris  -     LIPID PANEL    13. S/P CABG x 4    14. S/P cardiac pacemaker procedure    15. Prediabetes  -     HEMOGLOBIN A1C WITH EAG    16. Encounter for long-term (current) use of medications  -     CBC W/O DIFF  -     HEMOGLOBIN A1C WITH EAG  -     LIPID PANEL  -     METABOLIC PANEL, COMPREHENSIVE  -     TSH 3RD GENERATION    17. Postmenopausal bone loss  -     DEXA BONE DENSITY STUDY AXIAL;  Future      Health Maintenance Due   Topic Date Due    Bone Densitometry (Dexa) Screening  07/03/2009    GLAUCOMA SCREENING Q2Y  05/12/2016    MEDICARE YEARLY EXAM  03/21/2018    BREAST CANCER SCRN MAMMOGRAM  06/03/2018

## 2018-03-23 LAB
ALBUMIN SERPL-MCNC: 4.2 G/DL (ref 3.5–4.8)
ALBUMIN/GLOB SERPL: 1.3 {RATIO} (ref 1.2–2.2)
ALP SERPL-CCNC: 86 IU/L (ref 39–117)
ALT SERPL-CCNC: 10 IU/L (ref 0–32)
AST SERPL-CCNC: 18 IU/L (ref 0–40)
BILIRUB SERPL-MCNC: 0.6 MG/DL (ref 0–1.2)
BUN SERPL-MCNC: 19 MG/DL (ref 8–27)
BUN/CREAT SERPL: 19 (ref 12–28)
CALCIUM SERPL-MCNC: 9.3 MG/DL (ref 8.7–10.3)
CHLORIDE SERPL-SCNC: 100 MMOL/L (ref 96–106)
CHOLEST SERPL-MCNC: 134 MG/DL (ref 100–199)
CO2 SERPL-SCNC: 24 MMOL/L (ref 18–29)
CREAT SERPL-MCNC: 0.98 MG/DL (ref 0.57–1)
ERYTHROCYTE [DISTWIDTH] IN BLOOD BY AUTOMATED COUNT: 14.9 % (ref 12.3–15.4)
EST. AVERAGE GLUCOSE BLD GHB EST-MCNC: 123 MG/DL
GFR SERPLBLD CREATININE-BSD FMLA CKD-EPI: 57 ML/MIN/1.73
GFR SERPLBLD CREATININE-BSD FMLA CKD-EPI: 66 ML/MIN/1.73
GLOBULIN SER CALC-MCNC: 3.3 G/DL (ref 1.5–4.5)
GLUCOSE SERPL-MCNC: 90 MG/DL (ref 65–99)
HBA1C MFR BLD: 5.9 % (ref 4.8–5.6)
HCT VFR BLD AUTO: 39.8 % (ref 34–46.6)
HDLC SERPL-MCNC: 44 MG/DL
HGB BLD-MCNC: 12.9 G/DL (ref 11.1–15.9)
INTERPRETATION: NORMAL
LDLC SERPL CALC-MCNC: 75 MG/DL (ref 0–99)
MCH RBC QN AUTO: 26.5 PG (ref 26.6–33)
MCHC RBC AUTO-ENTMCNC: 32.4 G/DL (ref 31.5–35.7)
MCV RBC AUTO: 82 FL (ref 79–97)
PLATELET # BLD AUTO: 209 X10E3/UL (ref 150–379)
POTASSIUM SERPL-SCNC: 4.2 MMOL/L (ref 3.5–5.2)
PROT SERPL-MCNC: 7.5 G/DL (ref 6–8.5)
RBC # BLD AUTO: 4.86 X10E6/UL (ref 3.77–5.28)
SODIUM SERPL-SCNC: 139 MMOL/L (ref 134–144)
TRIGL SERPL-MCNC: 73 MG/DL (ref 0–149)
TSH SERPL DL<=0.005 MIU/L-ACNC: 1.46 UIU/ML (ref 0.45–4.5)
VLDLC SERPL CALC-MCNC: 15 MG/DL (ref 5–40)
WBC # BLD AUTO: 3.9 X10E3/UL (ref 3.4–10.8)

## 2018-03-30 ENCOUNTER — HOSPITAL ENCOUNTER (OUTPATIENT)
Dept: BONE DENSITY | Age: 73
Discharge: HOME | End: 2018-03-30
Payer: MEDICARE

## 2018-03-30 DIAGNOSIS — M81.0 POSTMENOPAUSAL BONE LOSS: ICD-10-CM

## 2018-03-30 PROCEDURE — 77080 DXA BONE DENSITY AXIAL: CPT

## 2018-04-02 DIAGNOSIS — I10 ESSENTIAL HYPERTENSION: ICD-10-CM

## 2018-04-02 RX ORDER — LOSARTAN POTASSIUM AND HYDROCHLOROTHIAZIDE 12.5; 5 MG/1; MG/1
TABLET ORAL
Qty: 90 TAB | Refills: 3 | Status: SHIPPED | OUTPATIENT
Start: 2018-04-02 | End: 2019-03-21 | Stop reason: SDUPTHER

## 2018-04-03 ENCOUNTER — TELEPHONE (OUTPATIENT)
Dept: FAMILY MEDICINE CLINIC | Age: 73
End: 2018-04-03

## 2018-04-03 ENCOUNTER — CLINICAL SUPPORT (OUTPATIENT)
Dept: CARDIOLOGY CLINIC | Age: 73
End: 2018-04-03

## 2018-04-03 DIAGNOSIS — I48.0 PAF (PAROXYSMAL ATRIAL FIBRILLATION) (HCC): ICD-10-CM

## 2018-04-03 DIAGNOSIS — Z95.0 S/P CARDIAC PACEMAKER PROCEDURE: Primary | ICD-10-CM

## 2018-04-03 DIAGNOSIS — I25.810 CORONARY ARTERY DISEASE INVOLVING CORONARY BYPASS GRAFT OF NATIVE HEART WITHOUT ANGINA PECTORIS: ICD-10-CM

## 2018-04-03 DIAGNOSIS — I47.1 PAROXYSMAL SUPRAVENTRICULAR TACHYCARDIA (HCC): Primary | ICD-10-CM

## 2018-04-03 NOTE — TELEPHONE ENCOUNTER
Patient requesting Cardiology referral to Dr Onesimo Aguila. Patient was seen 4/3/18, who needs an [employer] referral. Diagnoses [facility].

## 2018-06-15 RX ORDER — SOTALOL HYDROCHLORIDE 80 MG/1
TABLET ORAL
Qty: 60 TAB | Refills: 11 | Status: SHIPPED | OUTPATIENT
Start: 2018-06-15 | End: 2019-08-15 | Stop reason: SDUPTHER

## 2018-06-20 ENCOUNTER — OFFICE VISIT (OUTPATIENT)
Dept: CARDIOLOGY CLINIC | Age: 73
End: 2018-06-20

## 2018-06-20 VITALS
SYSTOLIC BLOOD PRESSURE: 139 MMHG | BODY MASS INDEX: 35.7 KG/M2 | HEIGHT: 62 IN | WEIGHT: 194 LBS | HEART RATE: 60 BPM | DIASTOLIC BLOOD PRESSURE: 87 MMHG | RESPIRATION RATE: 12 BRPM | OXYGEN SATURATION: 98 %

## 2018-06-20 DIAGNOSIS — I49.5 SSS (SICK SINUS SYNDROME) (HCC): ICD-10-CM

## 2018-06-20 DIAGNOSIS — I10 ESSENTIAL HYPERTENSION: ICD-10-CM

## 2018-06-20 DIAGNOSIS — E78.2 MIXED HYPERLIPIDEMIA: ICD-10-CM

## 2018-06-20 DIAGNOSIS — Z95.0 S/P CARDIAC PACEMAKER PROCEDURE: ICD-10-CM

## 2018-06-20 DIAGNOSIS — I25.9 CHRONIC ISCHEMIC HEART DISEASE: Primary | ICD-10-CM

## 2018-06-20 DIAGNOSIS — Z95.1 S/P CABG X 4: ICD-10-CM

## 2018-06-20 DIAGNOSIS — I48.0 PAF (PAROXYSMAL ATRIAL FIBRILLATION) (HCC): ICD-10-CM

## 2018-06-20 DIAGNOSIS — R73.03 PREDIABETES: ICD-10-CM

## 2018-06-20 DIAGNOSIS — I47.1 PAROXYSMAL SUPRAVENTRICULAR TACHYCARDIA (HCC): ICD-10-CM

## 2018-06-20 PROBLEM — E66.01 SEVERE OBESITY (BMI 35.0-39.9): Status: ACTIVE | Noted: 2018-06-20

## 2018-06-20 NOTE — PROGRESS NOTES
Chief Complaint   Patient presents with    Hypertension     no other complaints. 1. Have you been to the ER, urgent care clinic since your last visit? Hospitalized since your last visit? NO    2. Have you seen or consulted any other health care providers outside of the [address] [place] [address] since your last visit? Include any pap smears or colon screening.  NO

## 2018-06-20 NOTE — MR AVS SNAPSHOT
[address] [facility] 
 
 
 [address] [place] 
[phone] Patient: Pam Mcgowan MRN: [id number]:[id number] Visit Information Date & Time Provider Department Dept. Phone Encounter #  
 6/20/2018  9:15 AM MD Ivana Grover [facility] [place] [address] [id number] Your Appointments 9/20/2018 10:30 AM  
ROUTINE CARE with Apolinar Meigs, MD  
[facility]) Appt Note: 6 mon f/u  
 [facility] [address] [address]  
[facility]  
  
    
 10/4/2018  1:00 PM  
PROCEDURE with PACEMAKER, [facility] Appt Note: 6mo bsc dcpm  
 [id number] [facility]  
[phone] [address] [facility]  
  
    
 10/4/2018  1:00 PM  
ESTABLISHED PATIENT with Luca Baptiste NP [facility]) Appt Note: annual with device check [id number] [facility]  
[phone] [address] [facility]  
  
    
 12/19/2018  9:15 AM  
ESTABLISHED PATIENT with MD Ivana Grover [facility]) Appt Note: 6 MO. F/U  
 [address] [place]  
[phone] [address] Upcoming Health Maintenance Date Due  
 GLAUCOMA SCREENING Q2Y 5/12/2016 BREAST CANCER SCRN MAMMOGRAM 6/3/2018 Influenza Age 5 to Adult 9/24/2018* MEDICARE YEARLY EXAM 3/23/2019 DTaP/Tdap/Td series (2 - Td) 3/20/2027 COLONOSCOPY 1/5/2028 *Topic was postponed. The date shown is not the original due date.    
  
Allergies as of 6/20/2018  Review Complete On: 6/20/2018 By: Kezia Thakkar Arthurine Pupa, LPN Severity Noted Reaction Type Reactions Ace Inhibitors Medium 06/09/2010   Side Effect Cough Pcn [Penicillins] Medium 06/09/2010   Systemic Anaphylaxis, Hives Just got Rocephin in ED, no reactions Niaspan [Niacin]  03/20/2012    Unable to Obtain Current Immunizations  Reviewed on 9/21/2015 Name Date Influenza High Dose Vaccine PF 12/22/2015 Pneumococcal Polysaccharide (PPSV-23) 3/3/2017, 6/11/2013 Not reviewed this visit You Were Diagnosed With   
  
 Codes Comments PAF (paroxysmal atrial fibrillation) ([facility]    -  Primary ICD-10-CM: I48.0 ICD-9-CM: 427.31 Chronic ischemic heart disease     ICD-10-CM: I25.9 ICD-9-CM: 414.9 Vitals BP Pulse Resp Height(growth percentile) Weight(growth percentile) LMP  
 139/87 (BP 1 Location: Right arm, BP Patient Position: Sitting) 60 12 5' 2\" (1.575 m) 194 lb (88 kg) 08/24/1993 SpO2 BMI OB Status Smoking Status 98% 35.48 kg/m2 Postmenopausal Former Smoker Vitals History BMI and BSA Data Body Mass Index Body Surface Area  
 35.48 kg/m 2 1.96 m 2 Preferred Pharmacy Pharmacy Name Phone [id number]- [place], [address] [facility] [phone] Your Updated Medication List  
  
   
This list is accurate as of 6/20/18  9:59 AM.  Always use your most recent med list.  
  
  
  
  
 aspirin 325 mg tablet Commonly known as:  ASPIRIN Take 325 mg by mouth daily. CENTRUM SILVER PO Take  by mouth daily. DAILY FIBER PO Take  by mouth daily. FISH OIL 1,000 mg Cap Generic drug:  omega-3 fatty acids-vitamin e Take  by mouth daily. losartan-hydroCHLOROthiazide 50-12.5 mg per tablet Commonly known as:  HYZAAR  
TAKE 1 TABLET BY MOUTH EVERY DAY  
  
 simvastatin 40 mg tablet Commonly known as:  ZOCOR  
TAKE 1 TABLET BY MOUTH EVERY EVENING  
  
 sotalol 80 mg tablet Commonly known as:  Jax Eid  
 TAKE 1 TABLET BY MOUTH EVERY TWELVE (12) HOURS. We Performed the Following AMB POC EKG ROUTINE W/ 12 LEADS, INTER & REP [72993 CPT(R)] Introducing [facility]! [place] introduces [employer] patient portal. Now you can access parts of your medical record, email your doctor's office, and request medication refills online. 1. In your internet browser, go to [email or web address] 2. Click on the First Time User? Click Here link in the Sign In box. You will see the New Member Sign Up page. 3. Enter your [employer] Access Code exactly as it appears below. You will not need to use this code after youve completed the sign-up process. If you do not sign up before the expiration date, you must request a new code. · [employer] Access Code: [email or web address] Expires: 9/18/2018  9:59 AM 
 
4. Enter the last four digits of your Social Security Number (xxxx) and Date of Birth (mm/dd/yyyy) as indicated and click Submit. You will be taken to the next sign-up page. 5. Create a Power[employer]s ID. This will be your [employer] login ID and cannot be changed, so think of one that is secure and easy to remember. 6. Create a [employer] password. You can change your password at any time. 7. Enter your Password Reset Question and Answer. This can be used at a later time if you forget your password. 8. Enter your e-mail address. You will receive e-mail notification when new information is available in [address] 9. Click Sign Up. You can now view and download portions of your medical record. 10. Click the Download Summary menu link to download a portable copy of your medical information. If you have questions, please visit the Frequently Asked Questions section of the [employer] website. Remember, [employer] is NOT to be used for urgent needs. For medical emergencies, dial 911. Now available from your iPhone and Android! Please provide this summary of care documentation to your next provider. Your primary care clinician is listed as Julieta Olsen. If you have any questions after today's visit, please call [phone].

## 2018-06-24 NOTE — PROGRESS NOTES
[facility]   [address] [facility], [address] [place] [address]                                          NEW PATIENT HPI/FOLLOW-UP      NAME:  Leobardo Mclean   :   1945   MRN:   [id number]   PCP:  Sena Quick MD           Subjective: The patient is a 67y.o. year old female  who returns for a routine follow-up. Since the last visit, patient reports no new symptoms. Continues to work part-time at [employer] where she has worked for over 36 yrs. Denies change in exercise tolerance, chest pain, edema, medication intolerance, palpitations, shortness of breath, PND/orthopnea wheezing, sputum, syncope, dizziness or light headedness. Doing satisfactorily. Review of Systems  General: Pt denies excessive weight gain or loss. Pt is able to conduct ADL's. Respiratory: Denies shortness of breath, GARZA, wheezing or stridor.   Cardiovascular: Denies precordial pain, palpitations, edema or PND  Gastrointestinal: Denies poor appetite, indigestion, abdominal pain or blood in stool  Peripheral vascular: Denies claudication, leg cramps  Neuropsychiatric: Denies paresthesias,tingling,numbness,anxiety,depression,fatigue  Musculoskeletal: Denies pain,tenderness, soreness,swelling      Past Medical History:   Diagnosis Date    Acquired cyst of kidney     CAD (coronary artery disease)     Cervical disc herniation     Essential hypertension     Hypercholesterolemia     Hypertension     PAF (paroxysmal atrial fibrillation) ([address] 2016    Paroxysmal supraventricular tachycardia ([address]     Postsurgical aortocoronary bypass status 3/20/2012    S/P cardiac pacemaker procedure 2016 [employer] dual chamber pacemaker implant    Urge incontinence      Patient Active Problem List    Diagnosis Date Noted    Severe obesity (BMI 35.0-39.9) ([address] 2018    Prediabetes 2017    S/P cardiac pacemaker procedure 2016    PAF (paroxysmal atrial fibrillation) ([address] 2016  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 05/23/2016    APC (atrial premature contractions) 05/23/2016    First degree AV block 05/23/2016    SSS (sick sinus syndrome) [facility] 05/23/2016    Advanced care planning/counseling discussion 03/22/2016    Dyslipidemia 06/12/2014    Bradycardia, sinus 05/10/2013    Urge incontinence     S/P CABG x 4 09/20/2012    Mixed hyperlipidemia 03/20/2012    Chronic ischemic heart disease 03/20/2012    Postsurgical aortocoronary bypass status 03/20/2012    Coronary atherosclerosis of native coronary artery 03/20/2012    Encounter for long-term (current) use of medications 07/28/2010    Cervical disc herniation     Hypertension     CAD (coronary artery disease)     Paroxysmal supraventricular tachycardia [facility])       Past Surgical History:   Procedure Laterality Date    CABG, ARTERY-VEIN, FOUR  2007    COLONOSCOPY N/A 1/5/2018    COLONOSCOPY performed by Nehemiah Alvarado MD at [facility] ENDOSCOPY    ENDOSCOPY, COLON, DIAGNOSTIC      due 2012 hx proctitis    HX CERVICAL DISKECTOMY      HX CORONARY ARTERY BYPASS GRAFT      HX HEENT      Thyroidectomy    HX ORTHOPAEDIC      benign thumb tumor    HX PACEMAKER Left 2016    HX UROLOGICAL  2011    stent for hydronephrosis     Allergies   Allergen Reactions    Ace Inhibitors Cough    Pcn [Penicillins] Anaphylaxis and Hives     Just got Rocephin in ED, no reactions    Niaspan [Niacin] Unable to Obtain      Family History   Problem Relation Age of Onset    Asthma Mother     Hypertension Father     Stroke Father     Cancer Sister      breast    Diabetes Brother     Heart Disease Sister     Heart Disease Brother       Social History     Social History    Marital status:      Spouse name: N/A    Number of children: N/A    Years of education: N/A     Occupational History    Not on file.      Social History Main Topics    Smoking status: Former Smoker     Packs/day: 0.50     Years: 10.00     Types: Cigarettes     Quit date: 6/9/2007    Smokeless tobacco: Never Used    Alcohol use No    Drug use: No    Sexual activity: No     Other Topics Concern    Not on file     Social History Narrative      Current Outpatient Prescriptions   Medication Sig    sotalol (BETAPACE) 80 mg tablet TAKE 1 TABLET BY MOUTH EVERY TWELVE (12) HOURS.  losartan-hydroCHLOROthiazide (HYZAAR) 50-12.5 mg per tablet TAKE 1 TABLET BY MOUTH EVERY DAY    simvastatin (ZOCOR) 40 mg tablet TAKE 1 TABLET BY MOUTH EVERY EVENING    PSYLLIUM HUSK (DAILY FIBER PO) Take  by mouth daily.  FOLIC ACID/MULTIVIT-MIN/LUTEIN (CENTRUM SILVER PO) Take  by mouth daily.  aspirin (ASPIRIN) 325 mg tablet Take 325 mg by mouth daily.  omega-3 fatty acids-vitamin e (FISH OIL) 1,000 mg Cap Take  by mouth daily. No current facility-administered medications for this visit. I have reviewed the nurses notes, vitals, problem list, allergy list, medical history, family medical, social history and medications. Objective:     Physical Exam:     Vitals:    06/20/18 0931 06/20/18 0934   BP: 135/86 139/87   Pulse: 60 60   Resp: 12    SpO2: 98%    Weight: 194 lb (88 kg)    Height: 5' 2\" (1.575 m)     Body mass index is 35.48 kg/(m^2). General: Well developed, in no acute distress. HEENT: No carotid bruits, no JVD, trach is midline. Heart:  Normal S1/S2 negative S3 or S4. Regular, no murmur, gallop or rub.   Respiratory: Clear bilaterally, no wheezing or rales  Abdomen:   Soft, non-tender, bowel sounds are active.   Extremities:  No edema, normal cap refill, no cyanosis. Neuro: A&Ox3, speech clear, gait stable. Skin: Skin color is normal. No rashes or lesions. No diaphoresis. Vascular: 2+ pulses symmetric in all extremities        Data Review:       Cardiographics:    EKG: Electronic pacemaker with intermittent AV sequential pacing.     Cardiology Labs:    Results for orders placed or performed during the hospital encounter of 06/09/16 EKG, 12 LEAD, INITIAL   Result Value Ref Range    Ventricular Rate 62 BPM    Atrial Rate 62 BPM    P-R Interval 260 ms    QRS Duration 80 ms    Q-T Interval 478 ms    QTC Calculation (Bezet) 485 ms    Calculated P Axis 49 degrees    Calculated R Axis 27 degrees    Calculated T Axis 45 degrees    Diagnosis       ** Poor data quality, interpretation may be adversely affected  Sinus rhythm with 1st degree AV block with premature atrial complexes  Nonspecific T wave abnormality Lead V6 is missing   Prolonged QT  When compared with ECG of 09-JUN-2016 13:27,  Previous ECG has undetermined rhythm, needs review  Nonspecific T wave abnormality, improved in Inferior leads  Nonspecific T wave abnormality, improved in Lateral leads  Confirmed by Didi Zimmerman ([id number]) on 6/10/2016 9:53:08 AM     Results for orders placed or performed in visit on 05/23/16   CARDIAC HOLTER MONITOR, 24 HOURS    Narrative    ECG Monitor/24 hours, Complete    Reason for Holter Monitor  ECTOPIC ATRIAL RHYTHM    Heartbeat    Slowest 42  Average 81  Fastest  138        Results:   Underlying Rhythm: Normal sinus rhythm      Atrial Arrhythmias: premature atrial contractions; occasional,  paroxysmal atrial fibrillation, paroxysmal atrial flutter and  severe bradycardia            AV Conduction: normal    Ventricular Arrhythmias: premature ventricular contractions;  occasional     ST Segment Analysis:normal     Symptom Correlation:  None reported    Comment:   Sinus rhythm with\ profound daytime bradycardia in the 40s and  pafib/pafl with rvr. Clinical correlation advised.      Pierre Morgan MD, [facility]          Lab Results   Component Value Date/Time    Cholesterol, total 134 03/22/2018 11:34 AM    HDL Cholesterol 44 03/22/2018 11:34 AM    LDL, calculated 75 03/22/2018 11:34 AM    Triglyceride 73 03/22/2018 11:34 AM    CHOL/HDL Ratio 3.4 07/28/2010 11:07 AM       Lab Results   Component Value Date/Time    Sodium 139 03/22/2018 11:34 AM Potassium 4.2 03/22/2018 11:34 AM    Chloride 100 03/22/2018 11:34 AM    CO2 24 03/22/2018 11:34 AM    Anion gap 7 06/10/2016 05:10 AM    Glucose 90 03/22/2018 11:34 AM    BUN 19 03/22/2018 11:34 AM    Creatinine 0.98 03/22/2018 11:34 AM    BUN/Creatinine ratio 19 03/22/2018 11:34 AM    GFR est AA 66 03/22/2018 11:34 AM    GFR est non-AA 57 (L) 03/22/2018 11:34 AM    Calcium 9.3 03/22/2018 11:34 AM    Bilirubin, total 0.6 03/22/2018 11:34 AM    AST (SGOT) 18 03/22/2018 11:34 AM    Alk. phosphatase 86 03/22/2018 11:34 AM    Protein, total 7.5 03/22/2018 11:34 AM    Albumin 4.2 03/22/2018 11:34 AM    Globulin 4.0 05/03/2011 04:50 AM    A-G Ratio 1.3 03/22/2018 11:34 AM    ALT (SGPT) 10 03/22/2018 11:34 AM          Assessment:       ICD-10-CM ICD-9-CM    1. Chronic ischemic heart disease I25.9 414.9 AMB POC EKG ROUTINE W/ 12 LEADS, INTER & REP   2. PAF (paroxysmal atrial fibrillation) ([facility]) I48.0 427.31    3. Paroxysmal supraventricular tachycardia ([facility]) I47.1 427.0    4. Mixed hyperlipidemia E78.2 272.2    5. Essential hypertension I10 401.9    6. S/P CABG x 4 Z95.1 V45.81    7. S/P cardiac pacemaker procedure Z95.0 V45.01    8. SSS (sick sinus syndrome) ([facility]) I49.5 427.81    9. Prediabetes R73.03 790.29          Discussion: Patient presents at this time stable from a cardiac perspective. No ischemic heart disease sequela. Performing work and home activities without concerns. Pleased with present cardiac status. Plan: 1. Continue same meds. Lipid profile and labs followed by PCP. 2.Encouraged to exercise to tolerance, lose weight and follow low fat, low cholesterol, low sodium predominantly Plant-based (consider Mediterranean) diet. Call with questions or concerns. Will follow up any test results by phone and/or f/u here in office if needed. [place] 3.Follow up: 6 months    I have discussed the diagnosis with the patient and the intended plan as seen in the above orders.   The patient has received an after-visit summary and questions were answered concerning future plans. I have discussed any concerning medication side effects and warnings with the patient as well.     Matthew Myers MD  6/24/2018

## 2018-09-20 ENCOUNTER — OFFICE VISIT (OUTPATIENT)
Dept: FAMILY MEDICINE CLINIC | Age: 73
End: 2018-09-20

## 2018-09-20 VITALS
TEMPERATURE: 97.6 F | BODY MASS INDEX: 35.77 KG/M2 | DIASTOLIC BLOOD PRESSURE: 88 MMHG | WEIGHT: 194.4 LBS | RESPIRATION RATE: 18 BRPM | HEART RATE: 63 BPM | OXYGEN SATURATION: 100 % | SYSTOLIC BLOOD PRESSURE: 134 MMHG | HEIGHT: 62 IN

## 2018-09-20 DIAGNOSIS — L88 PYODERMA GANGRENOSUM: ICD-10-CM

## 2018-09-20 DIAGNOSIS — Z95.1 POSTSURGICAL AORTOCORONARY BYPASS STATUS: ICD-10-CM

## 2018-09-20 DIAGNOSIS — I25.810 CORONARY ARTERY DISEASE INVOLVING CORONARY BYPASS GRAFT OF NATIVE HEART WITHOUT ANGINA PECTORIS: ICD-10-CM

## 2018-09-20 DIAGNOSIS — Z95.1 S/P CABG X 4: ICD-10-CM

## 2018-09-20 DIAGNOSIS — I49.5 SSS (SICK SINUS SYNDROME) (HCC): ICD-10-CM

## 2018-09-20 DIAGNOSIS — E78.2 MIXED HYPERLIPIDEMIA: ICD-10-CM

## 2018-09-20 DIAGNOSIS — Z95.0 S/P CARDIAC PACEMAKER PROCEDURE: ICD-10-CM

## 2018-09-20 DIAGNOSIS — I25.9 CHRONIC ISCHEMIC HEART DISEASE: ICD-10-CM

## 2018-09-20 DIAGNOSIS — I10 ESSENTIAL HYPERTENSION: ICD-10-CM

## 2018-09-20 DIAGNOSIS — I47.1 PAROXYSMAL SUPRAVENTRICULAR TACHYCARDIA (HCC): Primary | ICD-10-CM

## 2018-09-20 DIAGNOSIS — I48.0 PAF (PAROXYSMAL ATRIAL FIBRILLATION) (HCC): ICD-10-CM

## 2018-09-20 RX ORDER — CLOBETASOL PROPIONATE 0.5 MG/G
CREAM TOPICAL 2 TIMES DAILY
Qty: 15 G | Refills: 0 | Status: SHIPPED | OUTPATIENT
Start: 2018-09-20 | End: 2019-04-05 | Stop reason: ALTCHOICE

## 2018-09-20 NOTE — PROGRESS NOTES
Chief Complaint   Patient presents with    Hypertension     6 month follow-up   1. Have you been to the ER, urgent care clinic since your last visit? Hospitalized since your last visit? No    2. Have you seen or consulted any other health care providers outside of the [facility] since your last visit? Include any pap smears or colon screening.  No   Sister passed from cancer , tomorrow  Room 7

## 2018-09-20 NOTE — PROGRESS NOTES
Subjective:     Chief Complaint   Patient presents with    Hypertension     6 month follow-up        She  is a 75 y.o. female who presents for evaluation of:  Sister passed away from cancer last week and having  tomorrow. Doing ok with grieving. Skin lesion - noted 2 weeks ago. Started as pustule and began draining about 1 week ago. Using Neosporin and Hydrogen Peroxide. Prev had similar lesion 6 months ago but this resolved and left hyperpigmented area. She had another small one that also left a scar. DEXA from 2018 showed some osteopenia. Stays very active. Mammo done in 2018 - nml  Colonoscopy - 2018 - tubular adenoma and repeat in 5 yrs    Hyperlipidemia & HTN - also has hx of PSVT and CAD s/p 4vCABG and followed by Dr. Gamaliel Cook - last appt was 18 and doing well. Pt is doing well on current meds with no medication side effects noted. BPs at home running 130-150s/80s  No new myalgias, no joint pains, no weakness  No TIA's, no chest pain on exertion, no dyspnea on exertion, no swelling of ankles. Exercising - Minimal, stays active  Dieting - Yes, drinks some sweet tea, but avoids fast foods and sodas.   Smoking - No     Lab Results   Component Value Date/Time    Cholesterol, total 134 2018 11:34 AM    HDL Cholesterol 44 2018 11:34 AM    LDL, calculated 75 2018 11:34 AM    Triglyceride 73 2018 11:34 AM     ROS  Gen - no fever/chills  Resp - no dyspnea or cough  CV - no chest pain or GARZA  Rest per HPI     Objective:     Vitals:    18 1059 18 1119   BP: 145/75 134/88   Pulse: 63    Resp: 18    Temp: 97.6 °F (36.4 °C)    TempSrc: Oral    SpO2: 100%    Weight: 194 lb 6.4 oz (88.2 kg)    Height: 5' 2\" (1.575 m)      Physical Examination:  General appearance - alert, well appearing, and in no distress  Eyes -sclera anicteric  Neck - supple, no significant adenopathy, no thyromegaly, no bruits  Chest - clear to auscultation, no wheezes, rales or rhonchi, symmetric air entry  Heart - normal rate, regular rhythm, normal S1, S2, no murmurs, rubs, clicks or gallops, + pacemaker  Neurological - alert, oriented, normal speech, no focal findings or movement disorder noted  Extr - trace edema bilat  Skin - central chest surgical scar noted, skin ulcer as below  Psych - nml mood and affect        Past Medical History:   Diagnosis Date    Acquired cyst of kidney     CAD (coronary artery disease)     Cervical disc herniation     Essential hypertension     Hypercholesterolemia     Hypertension     PAF (paroxysmal atrial fibrillation) ([facility] 6/9/2016    Paroxysmal supraventricular tachycardia ([facility]     Postsurgical aortocoronary bypass status 3/20/2012    S/P cardiac pacemaker procedure 6/9/2016 6/9/16 [employer] dual chamber pacemaker implant    Urge incontinence      Past Surgical History:   Procedure Laterality Date    CABG, ARTERY-VEIN, FOUR  2007    COLONOSCOPY N/A 1/5/2018    COLONOSCOPY performed by Apolinar Elmore MD at [facility] ENDOSCOPY    ENDOSCOPY, COLON, DIAGNOSTIC      due 2012 hx proctitis    HX CERVICAL DISKECTOMY      HX CORONARY ARTERY BYPASS GRAFT      HX HEENT      Thyroidectomy    HX ORTHOPAEDIC      benign thumb tumor    HX PACEMAKER Left 2016    HX UROLOGICAL  2011    stent for hydronephrosis     Current Outpatient Prescriptions on File Prior to Visit   Medication Sig Dispense Refill    sotalol (BETAPACE) 80 mg tablet TAKE 1 TABLET BY MOUTH EVERY TWELVE (12) HOURS. 60 Tab 11    losartan-hydroCHLOROthiazide (HYZAAR) 50-12.5 mg per tablet TAKE 1 TABLET BY MOUTH EVERY DAY 90 Tab 3    simvastatin (ZOCOR) 40 mg tablet TAKE 1 TABLET BY MOUTH EVERY EVENING 90 Tab 3    PSYLLIUM HUSK (DAILY FIBER PO) Take  by mouth daily.  FOLIC ACID/MULTIVIT-MIN/LUTEIN (CENTRUM SILVER PO) Take  by mouth daily.  aspirin (ASPIRIN) 325 mg tablet Take 325 mg by mouth daily.       omega-3 fatty acids-vitamin e (FISH OIL) 1,000 mg Cap Take  by mouth daily. No current facility-administered medications on file prior to visit. Allergies   Allergen Reactions    Ace Inhibitors Cough    Pcn [Penicillins] Anaphylaxis and Hives     Just got Rocephin in ED, no reactions    Niaspan [Niacin] Unable to Obtain       Assessment/ Plan:   Diagnoses and all orders for this visit:    1. Paroxysmal supraventricular tachycardia ([facility]  2. PAF (paroxysmal atrial fibrillation) ([facility]  3. Coronary artery disease involving coronary bypass graft of native heart without angina pectoris  4. Chronic ischemic heart disease  5. Postsurgical aortocoronary bypass status  6. S/P CABG x 4  7. Mixed hyperlipidemia  - Stable, followed by Cardiology and EP  -     REFERRAL TO CARDIOLOGY    8. Essential hypertension - controlled  -     METABOLIC PANEL, COMPREHENSIVE    9. SSS (sick sinus syndrome) ([facility]  10. S/P cardiac pacemaker procedure  -     REFERRAL TO CARDIOLOGY    11. Pyoderma gangrenosum - concerning given hx. Will send to [facility]. If unable to get in, will do bx at next appt to confirm. Labs as part of this w/u.  -     REFERRAL TO WOUND CARE  -     CBC W/O DIFF  -     METABOLIC PANEL, COMPREHENSIVE  -     SUPRIYA W/REFLEX  -     ANTI-NEUTROPHIL CYTOPLASMIC AB  -     clobetasol (TEMOVATE) 0.05 % topical cream; Apply  to affected area two (2) times a day. -     REFERRAL TO PLASTIC SURGERY    I have discussed the diagnosis with the patient and the intended plan as seen in the above orders. The patient has received an after-visit summary and questions were answered concerning future plans. I have discussed medication side effects and warnings with the patient as well. Follow-up Disposition:  Return in about 6 weeks (around 11/1/2018) for Regular Follow up.

## 2018-09-20 NOTE — MR AVS SNAPSHOT
[facility] [address] [facility] 
[phone] Patient: Aristeo Soto MRN: [id number] YWA:[id number] Visit Information Date & Time Provider Department Dept. Phone Encounter #  
 9/20/2018 10:30 AM Deepika Manuel MD 0263 [facility] at [address] [place] [address] [id number] Follow-up Instructions Return in about 6 weeks (around 11/1/2018) for Regular Follow up. Your Appointments 10/4/2018  1:00 PM  
PROCEDURE with PACEMAKER, [facility]) Appt Note: 6mo bsc dcpm  
 [address] [facility]  
[phone] [address] [facility]  
  
    
 10/4/2018  1:00 PM  
ESTABLISHED PATIENT with Kaity Wright NP [facility]) Appt Note: annual with device check [id number] [facility]  
[phone] [address] [facility]  
  
    
 12/19/2018  9:15 AM  
ESTABLISHED PATIENT with Jeff Damon MD  
[facility] at [facility]) Appt Note: 6 MO. F/U  
 [address] [place] [address]  
[phone] [address] [place] [address] [address] Upcoming Health Maintenance Date Due  
 GLAUCOMA SCREENING Q2Y 5/12/2016 Influenza Age 5 to Adult 1/25/2019* MEDICARE YEARLY EXAM 3/23/2019 BREAST CANCER SCRN MAMMOGRAM 6/18/2020 DTaP/Tdap/Td series (2 - Td) 3/20/2027 COLONOSCOPY 1/5/2028 *Topic was postponed. The date shown is not the original due date. Allergies as of 9/20/2018  Review Complete On: 9/20/2018 By: Kristina Pederson LPN Severity Noted Reaction Type Reactions Ace Inhibitors Medium 06/09/2010   Side Effect Cough Pcn [Penicillins] Medium 06/09/2010   Systemic Anaphylaxis, Hives Just got Rocephin in ED, no reactions Niaspan [Niacin]  03/20/2012    Unable to Obtain Current Immunizations  Reviewed on 9/21/2015 Name Date Influenza High Dose Vaccine PF 12/22/2015 Pneumococcal Polysaccharide (PPSV-23) 3/3/2017, 6/11/2013 Not reviewed this visit You Were Diagnosed With   
  
 Codes Comments Paroxysmal supraventricular tachycardia (HCC)    -  Primary ICD-10-CM: I47.1 ICD-9-CM: 427.0 PAF (paroxysmal atrial fibrillation) (HCC)     ICD-10-CM: I48.0 ICD-9-CM: 427.31 Coronary artery disease involving coronary bypass graft of native heart without angina pectoris     ICD-10-CM: I25.810 ICD-9-CM: 414.05 Chronic ischemic heart disease     ICD-10-CM: I25.9 ICD-9-CM: 414.9 Postsurgical aortocoronary bypass status     ICD-10-CM: Z95.1 ICD-9-CM: V45.81 S/P CABG x 4     ICD-10-CM: Z95.1 ICD-9-CM: V45.81 Mixed hyperlipidemia     ICD-10-CM: E78.2 ICD-9-CM: 272.2 Essential hypertension     ICD-10-CM: I10 
ICD-9-CM: 401.9 SSS (sick sinus syndrome) (HCC)     ICD-10-CM: I49.5 ICD-9-CM: 427.81 S/P cardiac pacemaker procedure     ICD-10-CM: Z95.0 ICD-9-CM: V45.01 Pyoderma gangrenosum     ICD-10-CM: K44 
ICD-9-CM: 686.01 Vitals BP Pulse Temp Resp Height(growth percentile) Weight(growth percentile) 134/88 (BP 1 Location: Left arm, BP Patient Position: Sitting) 63 97.6 °F (36.4 °C) (Oral) 18 5' 2\" (1.575 m) 194 lb 6.4 oz (88.2 kg) LMP SpO2 BMI OB Status Smoking Status 08/24/1993 100% 35.56 kg/m2 Postmenopausal Former Smoker Vitals History BMI and BSA Data Body Mass Index Body Surface Area 35.56 kg/m 2 1.96 m 2 Preferred Pharmacy Pharmacy Name Phone [id number]- [place], [address] [facility] 9 [phone] Your Updated Medication List  
  
   
This list is accurate as of 9/20/18 12:15 PM.  Always use your most recent med list.  
  
  
  
  
 aspirin 325 mg tablet Commonly known as:  ASPIRIN Take 325 mg by mouth daily. CENTRUM SILVER PO Take  by mouth daily. clobetasol 0.05 % topical cream  
Commonly known as:  Viviana [place] Apply  to affected area two (2) times a day. DAILY FIBER PO Take  by mouth daily. FISH OIL 1,000 mg Cap Generic drug:  omega-3 fatty acids-vitamin e Take  by mouth daily. losartan-hydroCHLOROthiazide 50-12.5 mg per tablet Commonly known as:  HYZAAR  
TAKE 1 TABLET BY MOUTH EVERY DAY  
  
 simvastatin 40 mg tablet Commonly known as:  ZOCOR  
TAKE 1 TABLET BY MOUTH EVERY EVENING  
  
 sotalol 80 mg tablet Commonly known as:  BETAPACE  
TAKE 1 TABLET BY MOUTH EVERY TWELVE (12) HOURS. Prescriptions Sent to Pharmacy Refills  
 clobetasol (TEMOVATE) 0.05 % topical cream 0 Sig: Apply  to affected area two (2) times a day. Class: Normal  
 Pharmacy: [address] [place] [address] #: [phone] Route: Topical  
  
We Performed the Following SUPRIYA W/REFLEX [84522 CPT(R)] ANTI-NEUTROPHIL CYTOPLASMIC AB [id number] CPT(R)] CBC W/O DIFF [03478 CPT(R)] METABOLIC PANEL, COMPREHENSIVE [61503 CPT(R)] REFERRAL TO CARDIOLOGY [TPL11 Custom] Comments:  
 Please evaluate patient for Paroxysmal supraventricular tachycardia (HCC) (I47.1) PAF (paroxysmal atrial fibrillation) (HCC) (I48.0) Coronary artery disease involving coronary bypass graft of native heart without angina pectoris (I25.810) 
 Appointment scheduled 10/4/18 REFERRAL TO PLASTIC SURGERY [REF89 Custom] REFERRAL TO WOUND CARE [QZJ321 Custom] Follow-up Instructions Return in about 6 weeks (around 11/1/2018) for Regular Follow up. Referral Information Referral ID Referred By Referred To  
  
 [id number] Adrian Limon MD   
   [address], [address] [facility] Phone: [phone] Fax: [phone] Visits Status Start Date End Date 1 New Request 9/20/18 9/20/19 If your referral has a status of pending review or denied, additional information will be sent to support the outcome of this decision. Referral ID Referred By Referred To  
 [id number] Junie Medina Not Available Visits Status Start Date End Date 1 New Request 9/20/18 9/20/19 If your referral has a status of pending review or denied, additional information will be sent to support the outcome of this decision. Referral ID Referred By Referred To  
 [id number] CARLO, [address] [facility], [address] [facility] [address] [facility] [address], 200 [facility] Visits Status Start Date End Date 1 New Request 9/20/18 9/20/19 If your referral has a status of pending review or denied, additional information will be sent to support the outcome of this decision. Introducing [facility]! Eugenia Mcnulty introduces The Box patient portal. Now you can access parts of your medical record, email your doctor's office, and request medication refills online. 1. In your internet browser, go to [email or web address] 2. Click on the First Time User? Click Here link in the Sign In box. You will see the New Member Sign Up page. 3. Enter your The Box Access Code exactly as it appears below. You will not need to use this code after youve completed the sign-up process. If you do not sign up before the expiration date, you must request a new code. · The Box Access Code: [email or web address] Expires: 12/19/2018 12:15 PM 
 
4. Enter the last four digits of your Social Security Number (xxxx) and Date of Birth (mm/dd/yyyy) as indicated and click Submit. You will be taken to the next sign-up page. 5. Create a AMSCt ID. This will be your The Box login ID and cannot be changed, so think of one that is secure and easy to remember. 6. Create a [employer] password. You can change your password at any time. 7. Enter your Password Reset Question and Answer. This can be used at a later time if you forget your password. 8. Enter your e-mail address. You will receive e-mail notification when new information is available in [address] 9. Click Sign Up. You can now view and download portions of your medical record. 10. Click the Download Summary menu link to download a portable copy of your medical information. If you have questions, please visit the Frequently Asked Questions section of the [employer] website. Remember, [employer] is NOT to be used for urgent needs. For medical emergencies, dial 911. Now available from your iPhone and Android! Please provide this summary of care documentation to your next provider. Your primary care clinician is listed as Anitha Poll. If you have any questions after today's visit, please call [phone].

## 2018-09-22 LAB
ALBUMIN SERPL-MCNC: 4.4 G/DL (ref 3.5–4.8)
ALBUMIN/GLOB SERPL: 1.5 {RATIO} (ref 1.2–2.2)
ALP SERPL-CCNC: 91 IU/L (ref 39–117)
ALT SERPL-CCNC: 13 IU/L (ref 0–32)
ANA SER QL: POSITIVE
AST SERPL-CCNC: 20 IU/L (ref 0–40)
BILIRUB SERPL-MCNC: 0.6 MG/DL (ref 0–1.2)
BUN SERPL-MCNC: 12 MG/DL (ref 8–27)
BUN/CREAT SERPL: 15 (ref 12–28)
C-ANCA TITR SER IF: NORMAL TITER
CALCIUM SERPL-MCNC: 9.4 MG/DL (ref 8.7–10.3)
CHLORIDE SERPL-SCNC: 101 MMOL/L (ref 96–106)
CO2 SERPL-SCNC: 25 MMOL/L (ref 20–29)
CREAT SERPL-MCNC: 0.78 MG/DL (ref 0.57–1)
DSDNA AB SER-ACNC: <1 IU/ML (ref 0–9)
ENA RNP AB SER-ACNC: 7.9 AI (ref 0–0.9)
ENA SM AB SER-ACNC: <0.2 AI (ref 0–0.9)
ENA SS-A AB SER-ACNC: <0.2 AI (ref 0–0.9)
ENA SS-B AB SER-ACNC: <0.2 AI (ref 0–0.9)
ERYTHROCYTE [DISTWIDTH] IN BLOOD BY AUTOMATED COUNT: 14.5 % (ref 12.3–15.4)
GLOBULIN SER CALC-MCNC: 3 G/DL (ref 1.5–4.5)
GLUCOSE SERPL-MCNC: 89 MG/DL (ref 65–99)
HCT VFR BLD AUTO: 39.8 % (ref 34–46.6)
HGB BLD-MCNC: 13 G/DL (ref 11.1–15.9)
MCH RBC QN AUTO: 26.7 PG (ref 26.6–33)
MCHC RBC AUTO-ENTMCNC: 32.7 G/DL (ref 31.5–35.7)
MCV RBC AUTO: 82 FL (ref 79–97)
P-ANCA ATYPICAL TITR SER IF: NORMAL TITER
P-ANCA TITR SER IF: NORMAL TITER
PLATELET # BLD AUTO: 226 X10E3/UL (ref 150–379)
POTASSIUM SERPL-SCNC: 4.4 MMOL/L (ref 3.5–5.2)
PROT SERPL-MCNC: 7.4 G/DL (ref 6–8.5)
RBC # BLD AUTO: 4.87 X10E6/UL (ref 3.77–5.28)
SEE BELOW, 164869: ABNORMAL
SODIUM SERPL-SCNC: 141 MMOL/L (ref 134–144)
WBC # BLD AUTO: 3.2 X10E3/UL (ref 3.4–10.8)

## 2018-10-04 ENCOUNTER — OFFICE VISIT (OUTPATIENT)
Dept: CARDIOLOGY CLINIC | Age: 73
End: 2018-10-04

## 2018-10-04 ENCOUNTER — CLINICAL SUPPORT (OUTPATIENT)
Dept: CARDIOLOGY CLINIC | Age: 73
End: 2018-10-04

## 2018-10-04 VITALS
WEIGHT: 197 LBS | RESPIRATION RATE: 18 BRPM | HEIGHT: 62 IN | OXYGEN SATURATION: 97 % | DIASTOLIC BLOOD PRESSURE: 80 MMHG | BODY MASS INDEX: 36.25 KG/M2 | SYSTOLIC BLOOD PRESSURE: 160 MMHG | HEART RATE: 60 BPM

## 2018-10-04 DIAGNOSIS — Z95.0 PACEMAKER: ICD-10-CM

## 2018-10-04 DIAGNOSIS — Z95.0 PACEMAKER: Primary | ICD-10-CM

## 2018-10-04 DIAGNOSIS — Z95.0 S/P CARDIAC PACEMAKER PROCEDURE: ICD-10-CM

## 2018-10-04 DIAGNOSIS — I10 ESSENTIAL HYPERTENSION: ICD-10-CM

## 2018-10-04 DIAGNOSIS — I48.0 PAF (PAROXYSMAL ATRIAL FIBRILLATION) (HCC): ICD-10-CM

## 2018-10-04 DIAGNOSIS — I49.5 SSS (SICK SINUS SYNDROME) (HCC): ICD-10-CM

## 2018-10-04 DIAGNOSIS — I49.5 SSS (SICK SINUS SYNDROME) (HCC): Primary | ICD-10-CM

## 2018-10-04 NOTE — PROGRESS NOTES
1. Have you been to the ER, urgent care clinic since your last visit? Hospitalized since your last visit? No    2. Have you seen or consulted any other health care providers outside of the [address] [place] [address] since your last visit? Include any pap smears or colon screening. No    Chief Complaint   Patient presents with    Pacemaker Check     Yearly appt. Denied cardiac symptoms.

## 2018-10-04 NOTE — PROGRESS NOTES
Subjective:      Roosevelt Leary is a 75 y.o. female is here for annual device follow up. She is doing well. The patient denies chest pain/ shortness of breath, orthopnea, PND, LE edema, palpitations, syncope, presyncope or fatigue.        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 05/23/2016    APC (atrial premature contractions) 05/23/2016    First degree AV block 05/23/2016    SSS (sick sinus syndrome) ([facility] 05/23/2016    Advanced care planning/counseling discussion 03/22/2016    Dyslipidemia 06/12/2014    Bradycardia, sinus 05/10/2013    Urge incontinence     S/P CABG x 4 09/20/2012    Mixed hyperlipidemia 03/20/2012    Chronic ischemic heart disease 03/20/2012    Postsurgical aortocoronary bypass status 03/20/2012    Coronary atherosclerosis of native coronary artery 03/20/2012    Encounter for long-term (current) use of medications 07/28/2010    Cervical disc herniation     Hypertension     CAD (coronary artery disease)     Paroxysmal supraventricular tachycardia (HCC)       Latisha Rothman MD  Past Medical History:   Diagnosis Date    Acquired cyst of kidney     CAD (coronary artery disease)     Cervical disc herniation     Essential hypertension     Hypercholesterolemia     Hypertension     PAF (paroxysmal atrial fibrillation) ([address] 6/9/2016    Paroxysmal supraventricular tachycardia ([address]     Postsurgical aortocoronary bypass status 3/20/2012    S/P cardiac pacemaker procedure 6/9/2016 6/9/16 [employer] dual chamber pacemaker implant    Urge incontinence       Past Surgical History:   Procedure Laterality Date    CABG, ARTERY-VEIN, FOUR  2007    COLONOSCOPY N/A 1/5/2018    COLONOSCOPY performed by Haroon Mcgregor MD at [facility]    ENDOSCOPY, COLON, DIAGNOSTIC      due 2012 hx proctitis    HX CERVICAL DISKECTOMY      HX CORONARY ARTERY BYPASS GRAFT      HX HEENT      Thyroidectomy    HX ORTHOPAEDIC      benign thumb tumor    HX PACEMAKER Left 2016    HX UROLOGICAL  2011    stent for hydronephrosis     Allergies   Allergen Reactions    Ace Inhibitors Cough    Pcn [Penicillins] Anaphylaxis and Hives     Just got Rocephin in ED, no reactions    Niaspan [Niacin] Unable to Obtain      Family History   Problem Relation Age of Onset    Asthma Mother     Hypertension Father     Stroke Father     Cancer Sister      breast    Diabetes Brother     Heart Disease Sister     Heart Disease Brother     negative for cardiac disease  Social History     Social History    Marital status:      Spouse name: N/A    Number of children: N/A    Years of education: N/A     Social History Main Topics    Smoking status: Former Smoker     Packs/day: 0.50     Years: 10.00     Types: Cigarettes     Quit date: 6/9/2007    Smokeless tobacco: Never Used    Alcohol use No    Drug use: No    Sexual activity: No     Other Topics Concern    Not on file     Social History Narrative     Current Outpatient Prescriptions   Medication Sig    clobetasol (TEMOVATE) 0.05 % topical cream Apply  to affected area two (2) times a day.  sotalol (BETAPACE) 80 mg tablet TAKE 1 TABLET BY MOUTH EVERY TWELVE (12) HOURS.  losartan-hydroCHLOROthiazide (HYZAAR) 50-12.5 mg per tablet TAKE 1 TABLET BY MOUTH EVERY DAY    simvastatin (ZOCOR) 40 mg tablet TAKE 1 TABLET BY MOUTH EVERY EVENING    PSYLLIUM HUSK (DAILY FIBER PO) Take  by mouth daily.  FOLIC ACID/MULTIVIT-MIN/LUTEIN (CENTRUM SILVER PO) Take  by mouth daily.  aspirin (ASPIRIN) 325 mg tablet Take 325 mg by mouth daily.  omega-3 fatty acids-vitamin e (FISH OIL) 1,000 mg Cap Take  by mouth daily. No current facility-administered medications for this visit.        There were no vitals filed for this visit. I have reviewed the nurses notes, vitals, problem list, allergy list, medical history, family, social history and medications. Review of Symptoms:    General: Pt denies excessive weight gain or loss. Pt is able to conduct ADL's  HEENT: Denies blurred vision, headaches, epistaxis and difficulty swallowing. Respiratory: Denies shortness of breath, GARZA, wheezing or stridor. Cardiovascular: Denies precordial pain, palpitations, edema or PND  Gastrointestinal: Denies poor appetite, indigestion, abdominal pain or blood in stool  Urinary: Denies dysuria, pyuria  Musculoskeletal: Denies pain or swelling from muscles or joints  Neurologic: Denies tremor, paresthesias, or sensory motor disturbance  Skin: Denies rash, itching or texture change. Psych: Denies depression      Physical Exam:      General: Well developed, in no acute distress. HEENT: Eyes - PERRL, no jvd  Heart:  Normal S1/S2 negative S3 or S4. Regular, no murmur, gallop or rub.   Respiratory: Clear bilaterally x 4, no wheezing or rales  Abdomen:   Soft, non-tender, bowel sounds are active.   Extremities:  No edema, normal cap refill, no cyanosis. Musculoskeletal: No clubbing  Neuro: A&Ox3, speech clear, gait stable. Skin: Skin color is normal. No rashes or lesions.  Non diaphoretic  Vascular: 2+ pulses symmetric in all extremities    Cardiographics    Ekg: dual chamber pacing    Results for orders placed or performed during the hospital encounter of 06/09/16   EKG, 12 LEAD, INITIAL   Result Value Ref Range    Ventricular Rate 62 BPM    Atrial Rate 62 BPM    P-R Interval 260 ms    QRS Duration 80 ms    Q-T Interval 478 ms    QTC Calculation (Bezet) 485 ms    Calculated P Axis 49 degrees    Calculated R Axis 27 degrees    Calculated T Axis 45 degrees    Diagnosis       ** Poor data quality, interpretation may be adversely affected  Sinus rhythm with 1st degree AV block with premature atrial complexes  Nonspecific T wave abnormality Lead V6 is missing   Prolonged QT  When compared with ECG of 09-JUN-2016 13:27,  Previous ECG has undetermined rhythm, needs review  Nonspecific T wave abnormality, improved in Inferior leads  Nonspecific T wave abnormality, improved in Lateral leads  Confirmed by Yayo Thapa ([id number]) on 6/10/2016 9:53:08 AM     Results for orders placed or performed in visit on 05/23/16   CARDIAC HOLTER MONITOR, 24 HOURS    Narrative    ECG Monitor/24 hours, Complete    Reason for Holter Monitor  ECTOPIC ATRIAL RHYTHM    Heartbeat    Slowest 42  Average 81  Fastest  138        Results:   Underlying Rhythm: Normal sinus rhythm      Atrial Arrhythmias: premature atrial contractions; occasional,  paroxysmal atrial fibrillation, paroxysmal atrial flutter and  severe bradycardia            AV Conduction: normal    Ventricular Arrhythmias: premature ventricular contractions;  occasional     ST Segment Analysis:normal     Symptom Correlation:  None reported    Comment:   Sinus rhythm with\ profound daytime bradycardia in the 40s and  pafib/pafl with rvr. Clinical correlation advised. Ronni Martin MD, [facility]            Lab Results   Component Value Date/Time    WBC 3.2 (L) 09/20/2018 01:01 PM    HGB 13.0 09/20/2018 01:01 PM    HCT 39.8 09/20/2018 01:01 PM    PLATELET 789 94/31/7025 01:01 PM    MCV 82 09/20/2018 01:01 PM      Lab Results   Component Value Date/Time    Sodium 141 09/20/2018 01:01 PM    Potassium 4.4 09/20/2018 01:01 PM    Chloride 101 09/20/2018 01:01 PM    CO2 25 09/20/2018 01:01 PM    Anion gap 7 06/10/2016 05:10 AM    Glucose 89 09/20/2018 01:01 PM    BUN 12 09/20/2018 01:01 PM    Creatinine 0.78 09/20/2018 01:01 PM    BUN/Creatinine ratio 15 09/20/2018 01:01 PM    GFR est AA 87 09/20/2018 01:01 PM    GFR est non-AA 75 09/20/2018 01:01 PM    Calcium 9.4 09/20/2018 01:01 PM    Bilirubin, total 0.6 09/20/2018 01:01 PM    AST (SGOT) 20 09/20/2018 01:01 PM    Alk.  phosphatase 91 09/20/2018 01:01 PM Protein, total 7.4 09/20/2018 01:01 PM    Albumin 4.4 09/20/2018 01:01 PM    Globulin 4.0 05/03/2011 04:50 AM    A-G Ratio 1.5 09/20/2018 01:01 PM    ALT (SGPT) 13 09/20/2018 01:01 PM         Assessment:     Assessment:        ICD-10-CM ICD-9-CM    1. SSS (sick sinus syndrome) ([facility]) I49.5 427.81    2. PAF (paroxysmal atrial fibrillation) ([facility]) I48.0 427.31    3. Essential hypertension I10 401.9    4. S/P cardiac pacemaker procedure Z95.0 V45.01      No orders of the defined types were placed in this encounter. Plan:   Ms. Ирина Miguel is here for yearly device follow up. She is doing well. EKG demonstrates ventricular pacing and device interrogation reveals normal functioning (68% AP, 29% RVP). Continue current medical therapy and follow up with Dr. Sumeet Britton in one year. Continue medical management for PAF, HTN. Thank you for allowing me to participate in Angel Hodgson 's care.     Jorge Alberto Vo NP

## 2018-11-01 ENCOUNTER — OFFICE VISIT (OUTPATIENT)
Dept: FAMILY MEDICINE CLINIC | Age: 73
End: 2018-11-01

## 2018-11-01 VITALS
DIASTOLIC BLOOD PRESSURE: 89 MMHG | HEART RATE: 66 BPM | WEIGHT: 196 LBS | HEIGHT: 62 IN | BODY MASS INDEX: 36.07 KG/M2 | OXYGEN SATURATION: 97 % | TEMPERATURE: 96.6 F | RESPIRATION RATE: 16 BRPM | SYSTOLIC BLOOD PRESSURE: 136 MMHG

## 2018-11-01 DIAGNOSIS — I48.0 PAF (PAROXYSMAL ATRIAL FIBRILLATION) (HCC): ICD-10-CM

## 2018-11-01 DIAGNOSIS — Z23 ENCOUNTER FOR IMMUNIZATION: ICD-10-CM

## 2018-11-01 DIAGNOSIS — I10 ESSENTIAL HYPERTENSION: ICD-10-CM

## 2018-11-01 DIAGNOSIS — S81.801S LEG WOUND, RIGHT, SEQUELA: Primary | ICD-10-CM

## 2018-11-01 NOTE — PROGRESS NOTES
Chief Complaint Patient presents with  Follow-up Open wound right lower leg 1. Have you been to the ER, urgent care clinic since your last visit? Hospitalized since your last visit? No 
 
2. Have you seen or consulted any other health care providers outside of the [address] [place] [address] since your last visit? Include any pap smears or colon screening. Yes Where: Cardiology She denies any symptoms , reactions or allergies that would exclude them from being immunized today. Risks and adverse reactions were discussed and the VIS was given to them. All questions were addressed. She was observed for 15 min post injection. There were no reactions observed.  
 
Erin Turner LPN

## 2018-11-01 NOTE — PROGRESS NOTES
Subjective: Chief Complaint Patient presents with  Follow-up Open wound right lower leg She  is a 75 y.o. female who presents for evaluation of: 
Skin lesion - noted 2 months ago. Concerning for Pyoderma gangrenosum and sent to plastic sgy for bx and eval but pt never heard back on this. Wound has healed with her conservative tx. Started as pustule and began draining about 1 week ago. Using Neosporin and Hydrogen Peroxide. Prev had similar lesion 6 months ago but this resolved and left hyperpigmented area. She had another small one that also left a scar. Hyperlipidemia & HTN - also has hx of PSVT and CAD s/p 4vCABG and followed by Dr. Alis Liu - last appt was 6/20/18 and doing well. Pt is doing well on current meds with no medication side effects noted. BPs at home running 130-150s/80s No new myalgias, no joint pains, no weakness No TIA's, no chest pain on exertion, no dyspnea on exertion, no swelling of ankles. Exercising - Minimal, stays active Dieting - Yes, drinks some sweet tea, but avoids fast foods and sodas. Smoking - No 
  
Lab Results Component Value Date/Time Cholesterol, total 134 03/22/2018 11:34 AM  
 HDL Cholesterol 44 03/22/2018 11:34 AM  
 LDL, calculated 75 03/22/2018 11:34 AM  
 Triglyceride 73 03/22/2018 11:34 AM  
 
ROS Gen - no fever/chills Resp - no dyspnea or cough CV - no chest pain or GRAZA Psych - grieving well after losing her sister Rest per HPI Objective:  
 
Vitals:  
 11/01/18 1037 BP: 136/89 Pulse: 66 Resp: 16 Temp: 96.6 °F (35.9 °C) TempSrc: Oral  
SpO2: 97% Weight: 196 lb (88.9 kg) Height: 5' 2\" (1.575 m) Physical Examination: 
General appearance - alert, well appearing, and in no distress Eyes -sclera anicteric Neck - supple, no significant adenopathy, no thyromegaly, no bruits Chest - clear to auscultation, no wheezes, rales or rhonchi, symmetric air entry Heart - normal rate, regular rhythm, normal S1, S2, no murmurs, rubs, clicks or gallops, + pacemaker Neurological - alert, oriented, normal speech, no focal findings or movement disorder noted Extr - trace edema bilat Psych - nml mood and affect Skin: RLE wound healed, pic below ( Left was initial presentation on 9/20/18, Right is today) Past Medical History:  
Diagnosis Date  Acquired cyst of kidney  CAD (coronary artery disease)  Cervical disc herniation  Essential hypertension  Hypercholesterolemia  Hypertension  PAF (paroxysmal atrial fibrillation) ([facility] 6/9/2016  Paroxysmal supraventricular tachycardia ([facility]  Postsurgical aortocoronary bypass status 3/20/2012  S/P cardiac pacemaker procedure 6/9/2016 6/9/16 [employer] dual chamber pacemaker implant  Urge incontinence Past Surgical History:  
Procedure Laterality Date  CABG, ARTERY-VEIN, FOUR  2007  ENDOSCOPY, COLON, DIAGNOSTIC    
 due 2012 hx proctitis  HX CERVICAL DISKECTOMY  HX CORONARY ARTERY BYPASS GRAFT    
 HX HEENT Thyroidectomy  HX ORTHOPAEDIC    
 benign thumb tumor  HX PACEMAKER Left 2016  HX UROLOGICAL  2011  
 stent for hydronephrosis Current Outpatient Medications on File Prior to Visit Medication Sig Dispense Refill  clobetasol (TEMOVATE) 0.05 % topical cream Apply  to affected area two (2) times a day. 15 g 0  
 sotalol (BETAPACE) 80 mg tablet TAKE 1 TABLET BY MOUTH EVERY TWELVE (12) HOURS. 60 Tab 11  
 losartan-hydroCHLOROthiazide (HYZAAR) 50-12.5 mg per tablet TAKE 1 TABLET BY MOUTH EVERY DAY 90 Tab 3  
 simvastatin (ZOCOR) 40 mg tablet TAKE 1 TABLET BY MOUTH EVERY EVENING 90 Tab 3  
 PSYLLIUM HUSK (DAILY FIBER PO) Take  by mouth daily.  FOLIC ACID/MULTIVIT-MIN/LUTEIN (CENTRUM SILVER PO) Take  by mouth daily.  aspirin (ASPIRIN) 325 mg tablet Take 325 mg by mouth daily.  omega-3 fatty acids-vitamin e (FISH OIL) 1,000 mg Cap Take  by mouth daily. No current facility-administered medications on file prior to visit. Allergies Allergen Reactions  Ace Inhibitors Cough  Pcn [Penicillins] Anaphylaxis and Hives Just got Rocephin in ED, no reactions  Niaspan [Niacin] Unable to Obtain Assessment/ Plan:  
Diagnoses and all orders for this visit: 1. Leg wound, right, sequela - improved. Will monitor at this point. Unlikely pyoderma given healing over last 6 weeks despite initial appearance and rapid growth. 2. Essential hypertension - controlled 3. PAF (paroxysmal atrial fibrillation) (HCC) - stable 4. Encounter for immunization 
-     INFLUENZA VACCINE INACTIVATED (IIV), SUBUNIT, ADJUVANTED, IM 
-     FL IMMUNIZ ADMIN,1 SINGLE/COMB VAC/TOXOID I have discussed the diagnosis with the patient and the intended plan as seen in the above orders. The patient has received an after-visit summary and questions were answered concerning future plans. I have discussed medication side effects and warnings with the patient as well. Follow-up Disposition: 
Return in about 3 months (around 2/1/2019) for Regular Follow up.

## 2018-11-29 ENCOUNTER — TELEPHONE (OUTPATIENT)
Dept: FAMILY MEDICINE CLINIC | Age: 73
End: 2018-11-29

## 2018-11-29 NOTE — TELEPHONE ENCOUNTER
Pt wants to come in tomorrow     Re: something on her leg     States the doctor told her \"if one comes up again, he wants to see it\"     Best number to reach her is [phone]

## 2018-12-03 ENCOUNTER — OFFICE VISIT (OUTPATIENT)
Dept: FAMILY MEDICINE CLINIC | Age: 73
End: 2018-12-03

## 2018-12-03 VITALS
BODY MASS INDEX: 35.59 KG/M2 | RESPIRATION RATE: 16 BRPM | SYSTOLIC BLOOD PRESSURE: 124 MMHG | HEART RATE: 60 BPM | TEMPERATURE: 97.3 F | HEIGHT: 62 IN | OXYGEN SATURATION: 98 % | WEIGHT: 193.4 LBS | DIASTOLIC BLOOD PRESSURE: 75 MMHG

## 2018-12-03 DIAGNOSIS — S16.1XXA ACUTE STRAIN OF NECK MUSCLE, INITIAL ENCOUNTER: ICD-10-CM

## 2018-12-03 DIAGNOSIS — L98.9 LEG SKIN LESION, RIGHT: Primary | ICD-10-CM

## 2018-12-03 RX ORDER — DICLOFENAC SODIUM 10 MG/G
GEL TOPICAL 4 TIMES DAILY
Qty: 100 G | Refills: 0 | Status: SHIPPED | OUTPATIENT
Start: 2018-12-03 | End: 2018-12-10 | Stop reason: SDUPTHER

## 2018-12-03 NOTE — PROGRESS NOTES
Subjective:     Chief Complaint   Patient presents with    Lesion     Right Calf      She  is a 75 y.o. female who presents for evaluation of:  Skin lesion - recently had new skin lesion    Initially presented with similar lesion 2 months ago that was concerning for Pyoderma gangrenosum and sent to plastic [facility] for bx and eval but pt never heard back on this. Wound healed with her conservative tx. Started as pustule and began draining. Using Neosporin and Hydrogen Peroxide. Prev had similar lesion 8 months ago but this resolved and left a hyperpigmented area.     ROS  Gen - no fever/chills  Resp - no dyspnea or cough  CV - no chest pain or GARZA  Rest per HPI     Objective:     Vitals:    12/03/18 1109   BP: 124/75   Pulse: 60   Resp: 16   Temp: 97.3 °F (36.3 °C)   TempSrc: Oral   SpO2: 98%   Weight: 193 lb 6.4 oz (87.7 kg)   Height: 5' 2\" (1.575 m)     Physical Examination:  General appearance - alert, well appearing, and in no distress  Eyes -sclera anicteric  Extr - trace edema bilat  Skin: RLE new lesion, pic below      Past Medical History:   Diagnosis Date    Acquired cyst of kidney     CAD (coronary artery disease)     Cervical disc herniation     Essential hypertension     Hypercholesterolemia     Hypertension     PAF (paroxysmal atrial fibrillation) ([address] 6/9/2016    Paroxysmal supraventricular tachycardia ([address]     Postsurgical aortocoronary bypass status 3/20/2012    S/P cardiac pacemaker procedure 6/9/2016 6/9/16 [employer] dual chamber pacemaker implant    Urge incontinence      Past Surgical History:   Procedure Laterality Date    CABG, ARTERY-VEIN, FOUR  2007    COLONOSCOPY N/A 1/5/2018    COLONOSCOPY performed by Hayder Flynn MD at [facility] ENDOSCOPY    ENDOSCOPY, COLON, DIAGNOSTIC      due 2012 hx proctitis    HX CERVICAL DISKECTOMY      HX CORONARY ARTERY BYPASS GRAFT      HX HEENT      Thyroidectomy    HX ORTHOPAEDIC      benign thumb tumor    HX PACEMAKER Left 2016  HX UROLOGICAL  2011    stent for hydronephrosis     Current Outpatient Medications on File Prior to Visit   Medication Sig Dispense Refill    clobetasol (TEMOVATE) 0.05 % topical cream Apply  to affected area two (2) times a day. 15 g 0    sotalol (BETAPACE) 80 mg tablet TAKE 1 TABLET BY MOUTH EVERY TWELVE (12) HOURS. 60 Tab 11    losartan-hydroCHLOROthiazide (HYZAAR) 50-12.5 mg per tablet TAKE 1 TABLET BY MOUTH EVERY DAY 90 Tab 3    simvastatin (ZOCOR) 40 mg tablet TAKE 1 TABLET BY MOUTH EVERY EVENING 90 Tab 3    PSYLLIUM HUSK (DAILY FIBER PO) Take  by mouth daily.  FOLIC ACID/MULTIVIT-MIN/LUTEIN (CENTRUM SILVER PO) Take  by mouth daily.  aspirin (ASPIRIN) 325 mg tablet Take 325 mg by mouth daily.  omega-3 fatty acids-vitamin e (FISH OIL) 1,000 mg Cap Take  by mouth daily. No current facility-administered medications on file prior to visit. Allergies   Allergen Reactions    Ace Inhibitors Cough    Pcn [Penicillins] Anaphylaxis and Hives     Just got Rocephin in ED, no reactions    Niaspan [Niacin] Unable to Obtain       Assessment/ Plan:   Diagnoses and all orders for this visit:    1. Leg skin lesion, right - less concerning for PG. Will send to [facility]. If not improved by next appt, will perform bx prior to seeing Derm to help clarify dx.  -     REFERRAL TO DERMATOLOGY    2. Acute strain of neck muscle, initial encounter  -     diclofenac (VOLTAREN) 1 % gel; Apply  to affected area four (4) times daily. I have discussed the diagnosis with the patient and the intended plan as seen in the above orders. The patient has received an after-visit summary and questions were answered concerning future plans. I have discussed medication side effects and warnings with the patient as well. Follow-up Disposition:  Return in about 4 weeks (around 12/31/2018).

## 2018-12-03 NOTE — PROGRESS NOTES
Chief Complaint   Patient presents with    Lesion     Right Calf   Appeared on 11/21/18 as white bumps and continued to get bigger

## 2018-12-04 ENCOUNTER — TELEPHONE (OUTPATIENT)
Dept: FAMILY MEDICINE CLINIC | Age: 73
End: 2018-12-04

## 2018-12-10 DIAGNOSIS — S16.1XXA ACUTE STRAIN OF NECK MUSCLE, INITIAL ENCOUNTER: ICD-10-CM

## 2018-12-10 RX ORDER — DICLOFENAC SODIUM 10 MG/G
GEL TOPICAL 4 TIMES DAILY
Qty: 300 G | Refills: 0 | Status: SHIPPED | OUTPATIENT
Start: 2018-12-10 | End: 2020-01-07

## 2018-12-19 ENCOUNTER — OFFICE VISIT (OUTPATIENT)
Dept: CARDIOLOGY CLINIC | Age: 73
End: 2018-12-19

## 2018-12-19 VITALS
DIASTOLIC BLOOD PRESSURE: 91 MMHG | RESPIRATION RATE: 18 BRPM | HEIGHT: 62 IN | HEART RATE: 60 BPM | SYSTOLIC BLOOD PRESSURE: 142 MMHG | BODY MASS INDEX: 35.51 KG/M2 | WEIGHT: 193 LBS | OXYGEN SATURATION: 97 %

## 2018-12-19 DIAGNOSIS — Z95.1 S/P CABG X 4: ICD-10-CM

## 2018-12-19 DIAGNOSIS — I10 HYPERTENSION, UNSPECIFIED TYPE: Primary | ICD-10-CM

## 2018-12-19 DIAGNOSIS — E78.2 MIXED HYPERLIPIDEMIA: ICD-10-CM

## 2018-12-19 DIAGNOSIS — I25.9 CHRONIC ISCHEMIC HEART DISEASE: ICD-10-CM

## 2018-12-19 DIAGNOSIS — Z95.0 PACEMAKER: ICD-10-CM

## 2018-12-19 DIAGNOSIS — I49.5 SSS (SICK SINUS SYNDROME) (HCC): ICD-10-CM

## 2018-12-19 DIAGNOSIS — I44.0 FIRST DEGREE AV BLOCK: ICD-10-CM

## 2018-12-19 NOTE — PROGRESS NOTES
[facility]   [address] [facility], [address] [place] [address]                                          NEW PATIENT HPI/FOLLOW-UP      NAME:  Maeve Soto   :   1945   MRN:   [id number]   PCP:  Lc Sethi MD           Subjective: The patient is a 75y.o. year old female with h/o HTN, HLD, SSS s/p pacemaker, 1st degree AV block, s/p CABG x 4, and chronic ischemic heart disease who returns for a routine follow-up. Since the last visit, patient reports no new symptoms. Denies change in exercise tolerance, chest pain, edema, medication intolerance, palpitations, shortness of breath, PND/orthopnea, wheezing, sputum, syncope, dizziness or light headedness. Doing satisfactorily. Review of Systems  General: Pt denies excessive weight gain or loss. Pt is able to conduct ADL's. Respiratory: Denies shortness of breath, GARZA, wheezing or stridor.   Cardiovascular: Denies precordial pain, palpitations, edema or PND  Gastrointestinal: Denies poor appetite, indigestion, abdominal pain or blood in stool  Peripheral vascular: Denies claudication, leg cramps  Neuropsychiatric: Denies paresthesias,tingling,numbness,anxiety,depression,fatigue  Musculoskeletal: Denies pain,tenderness, soreness,swelling      Past Medical History:   Diagnosis Date    Acquired cyst of kidney     CAD (coronary artery disease)     Cervical disc herniation     Essential hypertension     Hypercholesterolemia     Hypertension     [facility] 2016    [facility]oxysmal supraventricular tachycardia [facility]     Postsurgical aortocoronary bypass status 3/20/2012    S/P cardiac pacemaker procedure 2016 [employer] dual chamber pacemaker implant    Urge incontinence      Patient Active Problem List    Diagnosis Date Noted    Pacemaker 10/04/2018    Severe obesity (BMI 35.0-39.9) 2018    Prediabetes 2017    S/P cardiac pacemaker procedure 2016  PAF (paroxysmal atrial fibrillation) ([facility] 06/09/2016    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 05/23/2016    APC (atrial premature contractions) 05/23/2016    First degree AV block 05/23/2016    SSS (sick sinus syndrome) ([facility] 05/23/2016    Advanced care planning/counseling discussion 03/22/2016    Dyslipidemia 06/12/2014    Bradycardia, sinus 05/10/2013    Urge incontinence     S/P CABG x 4 09/20/2012    Mixed hyperlipidemia 03/20/2012    Chronic ischemic heart disease 03/20/2012    Postsurgical aortocoronary bypass status 03/20/2012    Coronary atherosclerosis of native coronary artery 03/20/2012    Encounter for long-term (current) use of medications 07/28/2010    Cervical disc herniation     Hypertension     CAD (coronary artery disease)     Paroxysmal supraventricular tachycardia [facility])       Past Surgical History:   Procedure Laterality Date    CABG, ARTERY-VEIN, FOUR  2007    COLONOSCOPY N/A 1/5/2018    COL[facility] ENDOSCOPY    ENDOSCOPY, COLON, DIAGNOSTIC      due 2012 hx proctitis    HX CERVICAL DISKECTOMY      HX CORONARY ARTERY BYPASS GRAFT      HX HEENT      Thyroidectomy    HX ORTHOPAEDIC      benign thumb tumor    HX PACEMAKER Left 2016    HX UROLOGICAL  2011    stent for hydronephrosis     Allergies   Allergen Reactions    Ace Inhibitors Cough    Pcn [Penicillins] Anaphylaxis and Hives     Just got Rocephin in ED, no reactions    Niaspan [Niacin] Unable to Obtain      Family History   Problem Relation Age of Onset    Asthma Mother     Hypertension Father     Stroke Father     Cancer Sister         breast    Diabetes Brother     Heart Disease Sister     Heart Disease Brother       Social History     Socioeconomic History    Marital status:      Spouse name: Not on file    Number of children: Not on file    Years of education: Not on file    Highest education level: Not on file   Social Needs    Financial resource strain: Not on file    Food insecurity - worry: Not on file    Food insecurity - inability: Not on file   [employer] needs - medical: Not on file   [employer] needs - non-medical: Not on file   Occupational History    Not on file   Tobacco Use    Smoking status: Former Smoker     Packs/day: 0.50     Years: 10.00     Pack years: 5.00     Types: Cigarettes     Last attempt to quit: 2007     Years since quittin.5    Smokeless tobacco: Never Used   Substance and Sexual Activity    Alcohol use: No     Alcohol/week: 0.0 oz    Drug use: No    Sexual activity: No   Other Topics Concern    Not on file   Social History Narrative    Not on file      Current Outpatient Medications   Medication Sig    diclofenac (VOLTAREN) 1 % gel Apply  to affected area four (4) times daily.  clobetasol (TEMOVATE) 0.05 % topical cream Apply  to affected area two (2) times a day.  sotalol (BETAPACE) 80 mg tablet TAKE 1 TABLET BY MOUTH EVERY TWELVE (12) HOURS.  losartan-hydroCHLOROthiazide (HYZAAR) 50-12.5 mg per tablet TAKE 1 TABLET BY MOUTH EVERY DAY    simvastatin (ZOCOR) 40 mg tablet TAKE 1 TABLET BY MOUTH EVERY EVENING    PSYLLIUM HUSK (DAILY FIBER PO) Take  by mouth daily.  FOLIC ACID/MULTIVIT-MIN/LUTEIN (CENTRUM SILVER PO) Take  by mouth daily.  aspirin (ASPIRIN) 325 mg tablet Take 325 mg by mouth daily.  omega-3 fatty acids-vitamin e (FISH OIL) 1,000 mg Cap Take  by mouth daily. No current facility-administered medications for this visit. I have reviewed the nurses notes, vitals, problem list, allergy list, medical history, family medical, social history and medications. Objective:     Physical Exam:     Vitals:    18 0853   BP: (!) 142/91   Pulse: 60   Resp: 18   SpO2: 97%   Weight: 193 lb (87.5 kg)   Height: 5' 2\" (1.575 m)    Body mass index is 35.3 kg/m². General: WDWN obese womane, in no acute distress. Pleasant affect.   HEENT: No carotid bruits, no JVD, trach is midline. Heart:  Normal S1/S2 negative S3 or S4. RRR, no murmur, gallop or rub.   Respiratory: Clear bilaterally, no wheezing or rales  Abdomen:   Soft, non-tender, bowel sounds are active.   Extremities:  No edema, normal cap refill, no cyanosis. Neuro: A&Ox3, speech clear, gait stable. Skin: Skin color is normal. No rashes or lesions. No diaphoresis. Vascular: 2+ pulses symmetric in all extremities        Data Review:       Cardiographics:    EKG interpretation:  Rhythm: Paced rhythm, borderline bradycardia and 1st degree AV block. Delayed R wave progression.         Cardiology Labs:    Results for orders placed or performed during the hospital encounter of 06/09/16   EKG, 12 LEAD, INITIAL   Result Value Ref Range    Ventricular Rate 62 BPM    Atrial Rate 62 BPM    P-R Interval 260 ms    QRS Duration 80 ms    Q-T Interval 478 ms    QTC Calculation (Bezet) 485 ms    Calculated P Axis 49 degrees    Calculated R Axis 27 degrees    Calculated T Axis 45 degrees    Diagnosis       ** Poor data quality, interpretation may be adversely affected  Sinus rhythm with 1st degree AV block with premature atrial complexes  Nonspecific T wave abnormality Lead V6 is missing   Prolonged QT  When compared with ECG of 09-JUN-2016 13:27,  Previous ECG has undetermined rhythm, needs review  Nonspecific T wave abnormality, improved in Inferior leads  Nonspecific T wave abnormality, improved in Lateral leads  Confirmed by Kaveh Merino ([id number]) on 6/10/2016 9:53:08 AM     Results for orders placed or performed in visit on 05/23/16   CARDIAC HOLTER MONITOR, 24 HOURS    Narrative    ECG Monitor/24 hours, Complete    Reason for Holter Monitor  ECTOPIC ATRIAL RHYTHM    Heartbeat    Slowest 42  Average 81  Fastest  138        Results:   Underlying Rhythm: Normal sinus rhythm      Atrial Arrhythmias: premature atrial contractions; occasional,  paroxysmal atrial fibrillation, paroxysmal atrial flutter and  severe bradycardia            AV Conduction: normal    Ventricular Arrhythmias: premature ventricular contractions;  occasional     ST Segment Analysis:normal     Symptom Correlation:  None reported    Comment:   Sinus rhythm with\ profound daytime bradycardia in the 40s and  pafib/pafl with rvr. Clinical correlation advised. Susan Carey MD, [facility]          Lab Results   Component Value Date/Time    Cholesterol, total 134 03/22/2018 11:34 AM    HDL Cholesterol 44 03/22/2018 11:34 AM    LDL, calculated 75 03/22/2018 11:34 AM    Triglyceride 73 03/22/2018 11:34 AM    CHOL/HDL Ratio 3.4 07/28/2010 11:07 AM       Lab Results   Component Value Date/Time    Sodium 141 09/20/2018 01:01 PM    Potassium 4.4 09/20/2018 01:01 PM    Chloride 101 09/20/2018 01:01 PM    CO2 25 09/20/2018 01:01 PM    Anion gap 7 06/10/2016 05:10 AM    Glucose 89 09/20/2018 01:01 PM    BUN 12 09/20/2018 01:01 PM    Creatinine 0.78 09/20/2018 01:01 PM    BUN/Creatinine ratio 15 09/20/2018 01:01 PM    GFR est AA 87 09/20/2018 01:01 PM    GFR est non-AA 75 09/20/2018 01:01 PM    Calcium 9.4 09/20/2018 01:01 PM    Bilirubin, total 0.6 09/20/2018 01:01 PM    AST (SGOT) 20 09/20/2018 01:01 PM    Alk. phosphatase 91 09/20/2018 01:01 PM    Protein, total 7.4 09/20/2018 01:01 PM    Albumin 4.4 09/20/2018 01:01 PM    Globulin 4.0 05/03/2011 04:50 AM    A-G Ratio 1.5 09/20/2018 01:01 PM    ALT (SGPT) 13 09/20/2018 01:01 PM          Assessment:       ICD-10-CM ICD-9-CM    1. Hypertension, unspecified type I10 401.9 AMB POC EKG ROUTINE W/ 12 LEADS, INTER & REP   2. SSS (sick sinus syndrome) (HCC) I49.5 427.81    3. First degree AV block I44.0 426.11    4. Pacemaker Z95.0 V45.01    5. S/P CABG x 4 Z95.1 V45.81    6. Chronic ischemic heart disease I25.9 414.9    7. Mixed hyperlipidemia E78.2 272.2          Discussion: Patient presents at this time stable from a cardiac perspective. BP was high normal. Keep diary and call if < 140/90.  Pleased with present status. Discussed/seen with Dr. Jodi Morocho: 1. Continue same meds. 2. Lipid profile and labs followed by PCP. 3.Encouraged to exercise to tolerance, lose weight,  and follow low fat, low cholesterol, low sodium predominantly Plant-based (consider Mediterranean) diet. 4.Follow up: 6 months   --  Call with questions or concerns. I have discussed the diagnosis with the patient and the intended plan as seen in the above orders. The patient has received an after-visit summary and questions were answered concerning future plans. I have discussed any concerning medication side effects and warnings with the patient as well. Haydee Bowen PA-C  12/19/2018     Patient seen and examined. All pertinent data reviewed. I have reviewed detailed note as outlined by Ignacia Mcnair. Case discussed with Nursing/medical assistant staff and Ignacia Mcnair. Patient presents cardiac stable. Continue same. Plans as outlined. Fran Angel

## 2018-12-19 NOTE — PROGRESS NOTES
Chief Complaint   Patient presents with    Hypertension     6 mo fu    Coronary Artery Disease     1. Have you been to the ER, urgent care clinic since your last visit? Hospitalized since your last visit? No    2. Have you seen or consulted any other health care providers outside of the [address] [place] [address] since your last visit? Include any pap smears or colon screening.  No

## 2018-12-19 NOTE — PATIENT INSTRUCTIONS
-- Please make a 6 months' follow up visit     Heart-Healthy Diet: Care Instructions  Your Care Instructions    A heart-healthy diet has lots of vegetables, fruits, nuts, beans, and whole grains, and is low in salt. It limits foods that are high in saturated fat, such as meats, cheeses, and fried foods. It may be hard to change your diet, but even small changes can lower your risk of heart attack and heart disease. Follow-up care is a key part of your treatment and safety. Be sure to make and go to all appointments, and call your doctor if you are having problems. It's also a good idea to know your test results and keep a list of the medicines you take. How can you care for yourself at home? Watch your portions  · Learn what a serving is. A \"serving\" and a \"portion\" are not always the same thing. Make sure that you are not eating larger portions than are recommended. For example, a serving of pasta is ½ cup. A serving size of meat is 2 to 3 ounces. A 3-ounce serving is about the size of a deck of cards. Measure serving sizes until you are good at [place]" them. Keep in mind that restaurants often serve portions that are 2 or 3 times the size of one serving. · To keep your energy level up and keep you from feeling hungry, eat often but in smaller portions. · Eat only the number of calories you need to stay at a healthy weight. If you need to lose weight, eat fewer calories than your body burns (through exercise and other physical activity). Eat more fruits and vegetables  · Eat a variety of fruit and vegetables every day. Dark green, deep orange, red, or yellow fruits and vegetables are especially good for you. Examples include spinach, carrots, peaches, and berries. · Keep carrots, celery, and other veggies handy for snacks. Buy fruit that is in season and store it where you can see it so that you will be tempted to eat it.   · Cook dishes that have a lot of veggies in them, such as stir-fries and soups.  Limit saturated and trans fat  · Read food labels, and try to avoid saturated and trans fats. They increase your risk of heart disease. Trans fat is found in many processed foods such as cookies and crackers. · Use olive or canola oil when you cook. Try cholesterol-lowering spreads, such as Benecol or Take Control. · Bake, broil, grill, or steam foods instead of frying them. · Choose lean meats instead of high-fat meats such as hot dogs and sausages. Cut off all visible fat when you prepare meat. · Eat fish, skinless poultry, and meat alternatives such as soy products instead of high-fat meats. Soy products, such as tofu, may be especially good for your heart. · Choose low-fat or fat-free milk and dairy products. Eat fish  · Eat at least two servings of fish a week. Certain fish, such as salmon and tuna, contain omega-3 fatty acids, which may help reduce your risk of heart attack. Eat foods high in fiber  · Eat a variety of grain products every day. Include whole-grain foods that have lots of fiber and nutrients. Examples of whole-grain foods include oats, whole wheat bread, and brown rice. · Buy whole-grain breads and cereals, instead of white bread or pastries. Limit salt and sodium  · Limit how much salt and sodium you eat to help lower your blood pressure. · Taste food before you salt it. Add only a little salt when you think you need it. With time, your taste buds will adjust to less salt. · Eat fewer snack items, fast foods, and other high-salt, processed foods. Check food labels for the amount of sodium in packaged foods. · Choose low-sodium versions of canned goods (such as soups, vegetables, and beans). Limit sugar  · Limit drinks and foods with added sugar. These include candy, desserts, and soda pop. Limit alcohol  · Limit alcohol to no more than 2 drinks a day for men and 1 drink a day for women. Too much alcohol can cause health problems. When should you call for help?   Watch closely for changes in your health, and be sure to contact your doctor if:    · You would like help planning heart-healthy meals. Where can you learn more? Go to http://breanna-tayo.info/. Enter V137 in the search box to learn more about \"Heart-Healthy Diet: Care Instructions. \"  Current as of: December 6, 2017  Content Version: 11.8  © 3784-4241 [employer]. Care instructions adapted under license by [employer] (which disclaims liability or warranty for this information). If you have questions about a medical condition or this instruction, always ask your healthcare professional. [place] [address] any warranty or liability for your use of this information.

## 2018-12-24 DIAGNOSIS — Z95.1 S/P CABG X 4: ICD-10-CM

## 2018-12-24 DIAGNOSIS — I25.9 CHRONIC ISCHEMIC HEART DISEASE: ICD-10-CM

## 2018-12-24 DIAGNOSIS — E78.5 DYSLIPIDEMIA: ICD-10-CM

## 2018-12-26 RX ORDER — SIMVASTATIN 40 MG/1
TABLET, FILM COATED ORAL
Qty: 90 TAB | Refills: 3 | Status: SHIPPED | OUTPATIENT
Start: 2018-12-26 | End: 2019-06-25 | Stop reason: SDUPTHER

## 2018-12-28 ENCOUNTER — TELEPHONE (OUTPATIENT)
Dept: FAMILY MEDICINE CLINIC | Age: 73
End: 2018-12-28

## 2018-12-28 NOTE — TELEPHONE ENCOUNTER
Spoke with patient, she is calling drug store to see if they can get it from [facility]. If not she is coming in Monday for appt anyway and said we can get it straight then since Dr. Servando Varghese will be back.

## 2018-12-28 NOTE — TELEPHONE ENCOUNTER
Jodi acosta/[facility]   Pt is calling her for a refill of simvastatin  Jodi told her they are not the provider who writes this and she can see in [address] [place] [address] it was sent in by Dr. Desiree Ortez on 12/26/18  Then pt argued that Dr. Desiree Ortez does not write it and it shouldn't have gone to [facility] is asking that this office might contact pt and give her info and maybe send to another pharmacy for her         [facility] [phone]

## 2018-12-31 ENCOUNTER — OFFICE VISIT (OUTPATIENT)
Dept: FAMILY MEDICINE CLINIC | Age: 73
End: 2018-12-31

## 2018-12-31 VITALS
OXYGEN SATURATION: 98 % | WEIGHT: 192.9 LBS | HEART RATE: 60 BPM | RESPIRATION RATE: 18 BRPM | HEIGHT: 62 IN | DIASTOLIC BLOOD PRESSURE: 78 MMHG | BODY MASS INDEX: 35.5 KG/M2 | SYSTOLIC BLOOD PRESSURE: 139 MMHG | TEMPERATURE: 97 F

## 2018-12-31 DIAGNOSIS — I10 ESSENTIAL HYPERTENSION: ICD-10-CM

## 2018-12-31 DIAGNOSIS — L98.9 LEG SKIN LESION, RIGHT: Primary | ICD-10-CM

## 2018-12-31 NOTE — PROGRESS NOTES
Chief Complaint   Patient presents with    Lesion     Right Leg   1. Have you been to the ER, urgent care clinic since your last visit? Hospitalized since your last visit? No    2. Have you seen or consulted any other health care providers outside of the [address] [place] [address] since your last visit? Include any pap smears or colon screening.  Yes Where: Dermatologist   Area has healed

## 2018-12-31 NOTE — PROGRESS NOTES
Subjective:     Chief Complaint   Patient presents with    Lesion     Right Leg      She  is a 75 y.o. female who presents for evaluation of:  Skin lesion - recently had new skin lesion. Saw Derm recently (12/2018) and rash has resolved. Initially presented with similar lesion 2-3 months ago that was concerning for Pyoderma gangrenosum and sent to plastic sgy for bx and eval but pt ended up getting scheduled for this. Wound healed with her conservative tx. Started as pustule and began draining. Using Neosporin and Hydrogen Peroxide. Prev had similar lesion 8 months ago but this resolved and left a hyperpigmented area.      ROS  Gen - no fever/chills  Resp - no dyspnea or cough  CV - no chest pain or GARZA  Rest per HPI     Objective:     Vitals:    12/31/18 0743 12/31/18 0745   BP: 142/85 139/78   Pulse: 60    Resp: 18    Temp: 97 °F (36.1 °C)    TempSrc: Oral    SpO2: 98%    Weight: 192 lb 14.4 oz (87.5 kg)    Height: 5' 2\" (1.575 m)      Physical Examination:  General appearance - alert, well appearing, and in no distress  Eyes -sclera anicteric  Extr - trace edema bilat  Skin: RLE with resolved skin lesion and some mild scarring    Past Medical History:   Diagnosis Date    Acquired cyst of kidney     CAD (coronary artery disease)     Cervical disc herniation     Essential hypertension     Hypercholesterolemia     Hypertension     PAF (paroxysmal atrial fibrillation) (HCC) 6/9/2016    Paroxysmal supraventricular tachycardia (HCC)     Postsurgical aortocoronary bypass status 3/20/2012    S/P cardiac pacemaker procedure 6/9/2016 6/9/16 [employer] dual chamber pacemaker implant    Urge incontinence      Past Surgical History:   Procedure Laterality Date    CABG, ARTERY-VEIN, FOUR  2007    COLONOSCOPY N/A 1/5/2018    [facility]is [facility] at [facility] ENDOSCOPY    ENDOSCOPY, COLON, DIAGNOSTIC      due 2012 hx proctitis    HX CERVICAL DISKECTOMY      HX CORONARY ARTERY BYPASS GRAFT      HX HEENT      Thyroidectomy    HX ORTHOPAEDIC      benign thumb tumor    HX PACEMAKER Left 2016    HX UROLOGICAL  2011    stent for hydronephrosis     Current Outpatient Medications on File Prior to Visit   Medication Sig Dispense Refill    simvastatin (ZOCOR) 40 mg tablet TAKE 1 TABLET BY MOUTH EVERY EVENING 90 Tab 3    diclofenac (VOLTAREN) 1 % gel Apply  to affected area four (4) times daily. 300 g 0    clobetasol (TEMOVATE) 0.05 % topical cream Apply  to affected area two (2) times a day. 15 g 0    sotalol (BETAPACE) 80 mg tablet TAKE 1 TABLET BY MOUTH EVERY TWELVE (12) HOURS. 60 Tab 11    losartan-hydroCHLOROthiazide (HYZAAR) 50-12.5 mg per tablet TAKE 1 TABLET BY MOUTH EVERY DAY 90 Tab 3    PSYLLIUM HUSK (DAILY FIBER PO) Take  by mouth daily.  FOLIC ACID/MULTIVIT-MIN/LUTEIN (CENTRUM SILVER PO) Take  by mouth daily.  aspirin (ASPIRIN) 325 mg tablet Take 325 mg by mouth daily.  omega-3 fatty acids-vitamin e (FISH OIL) 1,000 mg Cap Take  by mouth daily. No current facility-administered medications on file prior to visit. Allergies   Allergen Reactions    Ace Inhibitors Cough    Pcn [Penicillins] Anaphylaxis and Hives     Just got Rocephin in ED, no reactions    Niaspan [Niacin] Unable to Obtain       Assessment/ Plan:   Diagnoses and all orders for this visit:    1. Leg skin lesion, right-resolved. Can follow with Derm as needed    2. Essential hypertension-controlled    I have discussed the diagnosis with the patient and the intended plan as seen in the above orders. The patient has received an after-visit summary and questions were answered concerning future plans. I have discussed medication side effects and warnings with the patient as well. Follow-up Disposition:  Return in about 3 months (around 3/31/2019), or if symptoms worsen or fail to improve.

## 2019-01-14 ENCOUNTER — CLINICAL SUPPORT (OUTPATIENT)
Dept: CARDIOLOGY CLINIC | Age: 74
End: 2019-01-14

## 2019-01-14 DIAGNOSIS — Z95.0 CARDIAC PACEMAKER IN SITU: Primary | ICD-10-CM

## 2019-01-14 DIAGNOSIS — I47.1 PAROXYSMAL SUPRAVENTRICULAR TACHYCARDIA (HCC): ICD-10-CM

## 2019-03-21 DIAGNOSIS — I10 ESSENTIAL HYPERTENSION: ICD-10-CM

## 2019-03-21 RX ORDER — LOSARTAN POTASSIUM AND HYDROCHLOROTHIAZIDE 12.5; 5 MG/1; MG/1
TABLET ORAL
Qty: 90 TAB | Refills: 3 | Status: SHIPPED | OUTPATIENT
Start: 2019-03-21 | End: 2020-01-14 | Stop reason: SDUPTHER

## 2019-04-05 ENCOUNTER — OFFICE VISIT (OUTPATIENT)
Dept: FAMILY MEDICINE CLINIC | Age: 74
End: 2019-04-05

## 2019-04-05 VITALS
OXYGEN SATURATION: 97 % | HEIGHT: 62 IN | WEIGHT: 193.2 LBS | BODY MASS INDEX: 35.55 KG/M2 | DIASTOLIC BLOOD PRESSURE: 78 MMHG | TEMPERATURE: 96.8 F | SYSTOLIC BLOOD PRESSURE: 131 MMHG | HEART RATE: 60 BPM | RESPIRATION RATE: 16 BRPM

## 2019-04-05 DIAGNOSIS — Z71.89 ADVANCED DIRECTIVES, COUNSELING/DISCUSSION: ICD-10-CM

## 2019-04-05 DIAGNOSIS — Z95.1 S/P CABG X 4: ICD-10-CM

## 2019-04-05 DIAGNOSIS — I10 ESSENTIAL HYPERTENSION: ICD-10-CM

## 2019-04-05 DIAGNOSIS — Z95.0 S/P CARDIAC PACEMAKER PROCEDURE: ICD-10-CM

## 2019-04-05 DIAGNOSIS — Z79.899 ENCOUNTER FOR LONG-TERM (CURRENT) USE OF MEDICATIONS: ICD-10-CM

## 2019-04-05 DIAGNOSIS — Z13.31 SCREENING FOR DEPRESSION: ICD-10-CM

## 2019-04-05 DIAGNOSIS — I25.9 CHRONIC ISCHEMIC HEART DISEASE: ICD-10-CM

## 2019-04-05 DIAGNOSIS — I47.1 PAROXYSMAL SUPRAVENTRICULAR TACHYCARDIA (HCC): ICD-10-CM

## 2019-04-05 DIAGNOSIS — Z00.00 MEDICARE ANNUAL WELLNESS VISIT, SUBSEQUENT: Primary | ICD-10-CM

## 2019-04-05 DIAGNOSIS — I49.5 SSS (SICK SINUS SYNDROME) (HCC): ICD-10-CM

## 2019-04-05 DIAGNOSIS — E78.2 MIXED HYPERLIPIDEMIA: ICD-10-CM

## 2019-04-05 DIAGNOSIS — I25.810 CORONARY ARTERY DISEASE INVOLVING CORONARY BYPASS GRAFT OF NATIVE HEART WITHOUT ANGINA PECTORIS: ICD-10-CM

## 2019-04-05 DIAGNOSIS — Z13.39 SCREENING FOR ALCOHOLISM: ICD-10-CM

## 2019-04-05 DIAGNOSIS — I48.0 PAF (PAROXYSMAL ATRIAL FIBRILLATION) (HCC): ICD-10-CM

## 2019-04-05 RX ORDER — TRIAMCINOLONE ACETONIDE 1 MG/G
CREAM TOPICAL
Refills: 1 | COMMUNITY
Start: 2019-03-13 | End: 2019-04-05 | Stop reason: ALTCHOICE

## 2019-04-05 NOTE — PROGRESS NOTES
. 
Chief Complaint Patient presents with [facility] Annual Wellness Visit Medicare 1. Have you been to the ER, urgent care clinic since your last visit? Hospitalized since your last visit? No 
 
2. Have you seen or consulted any other health care providers outside of the [address] [place] [address] since your last visit? Include any pap smears or colon screening.  No

## 2019-04-05 NOTE — PROGRESS NOTES
Subjective: Chief Complaint Patient presents with [facility] Annual Wellness Visit Medicare She  is a 75 y.o. female who presents for evaluation of: DEXA from 4/2018 showed some osteopenia. Stays very active. Mammo done in 6/2018 - nml Colonoscopy - 1/2018 - tubular adenoma and repeat in 5 yrs Hyperlipidemia & HTN - also has hx of PSVT and CAD s/p 4vCABG and followed by Dr. Galen Lord - last appt was 6/20/18 and doing well. Pt is doing well on current meds with no medication side effects noted. BPs controlled at home  
no new myalgias, no joint pains, no weakness No TIA's, no chest pain on exertion, no dyspnea on exertion, no swelling of ankles. Exercising - Minimal, stays active Dieting - Yes, drinks some sweet tea, but avoids fast foods and sodas. Smoking - No 
  
Lab Results Component Value Date/Time Cholesterol, total 134 03/22/2018 11:34 AM  
 HDL Cholesterol 44 03/22/2018 11:34 AM  
 LDL, calculated 75 03/22/2018 11:34 AM  
 Triglyceride 73 03/22/2018 11:34 AM  
 
ROS Gen - no fever/chills Resp - no dyspnea or cough CV - no chest pain or GARZA Rest per HPI Objective:  
 
Vitals:  
 04/05/19 0937 BP: 131/78 Pulse: 60 Resp: 16 Temp: 96.8 °F (36 °C) TempSrc: Oral  
SpO2: 97% Weight: 193 lb 3.2 oz (87.6 kg) Height: 5' 2\" (1.575 m) Physical Examination: 
General appearance - alert, well appearing, and in no distress Eyes -sclera anicteric Neck - supple, no significant adenopathy, no thyromegaly, no bruits Chest - clear to auscultation, no wheezes, rales or rhonchi, symmetric air entry Heart - normal rate, regular rhythm, normal S1, S2, no murmurs, rubs, clicks or gallops, + pacemaker Neurological - alert, oriented, normal speech, no focal findings or movement disorder noted Extr - trace edema bilat Skin - central chest surgical scar noted Psych - nml mood and affect Past Medical History:  
Diagnosis Date  Acquired cyst of kidney  CAD (coronary artery disease)  Cervical disc herniation  Essential hypertension  Hypercholesterolemia  Hypertension  PAF (paroxysmal atrial fibrillation) ([facility] [address] 6/9/2016  Paroxysmal supraventricular tachycardia ([facility] [address]  Postsurgical aortocoronary bypass status 3/20/2012  S/P cardiac pacemaker procedure 6/9/2016 6/9/16 [employer] dual chamber pacemaker implant  Urge incontinence Past Surgical History:  
Procedure Laterality Date  CABG, ARTERY-VEIN, FOUR  2007  COLONOSCOPY N/A 1/5/2018 COLONOSCOPY performed by Cuco Rosales MD at [facility] ENDOSCOPY  ENDOSCOPY, COLON, DIAGNOSTIC    
 due 2012 hx proctitis  HX CERVICAL DISKECTOMY  HX CORONARY ARTERY BYPASS GRAFT    
 HX HEENT Thyroidectomy  HX ORTHOPAEDIC    
 benign thumb tumor  HX PACEMAKER Left 2016  HX UROLOGICAL  2011  
 stent for hydronephrosis Current Outpatient Medications on File Prior to Visit Medication Sig Dispense Refill  losartan-hydroCHLOROthiazide (HYZAAR) 50-12.5 mg per tablet TAKE 1 TABLET BY MOUTH EVERY DAY 90 Tab 3  
 simvastatin (ZOCOR) 40 mg tablet TAKE 1 TABLET BY MOUTH EVERY EVENING 90 Tab 3  
 diclofenac (VOLTAREN) 1 % gel Apply  to affected area four (4) times daily. 300 g 0  
 sotalol (BETAPACE) 80 mg tablet TAKE 1 TABLET BY MOUTH EVERY TWELVE (12) HOURS. 60 Tab 11  
 PSYLLIUM HUSK (DAILY FIBER PO) Take  by mouth daily.  FOLIC ACID/MULTIVIT-MIN/LUTEIN (CENTRUM SILVER PO) Take  by mouth daily.  aspirin (ASPIRIN) 325 mg tablet Take 325 mg by mouth daily.  omega-3 fatty acids-vitamin e (FISH OIL) 1,000 mg Cap Take  by mouth daily. No current facility-administered medications on file prior to visit. Allergies Allergen Reactions  Ace Inhibitors Cough  Pcn [Penicillins] Anaphylaxis and Hives Just got Rocephin in ED, no reactions  Niaspan [Niacin] Unable to Obtain Assessment/ Plan: Diagnoses and all orders for this visit: 
 
1. Medicare annual wellness visit, subsequent 
-     HEMOGLOBIN A1C WITH EAG 
-     LIPID PANEL 
-     METABOLIC PANEL, COMPREHENSIVE 
-     TSH 3RD GENERATION 2. Advanced directives, counseling/discussion 3. Screening for alcoholism -     [place] ANNUAL ALCOHOL SCREEN 15 MIN 4. Screening for depression 
-     [facility]. Paroxysmal supraventricular tachycardia ([facility] [address] 6. PAF (paroxysmal atrial fibrillation) ([facility] [address] 7. Coronary artery disease involving coronary bypass graft of native heart without angina pectoris 8. Chronic ischemic heart disease 9. S/P CABG x 4 
10. Mixed hyperlipidemia 
- Stable, followed by Cardiology and EP 
-     HEMOGLOBIN A1C WITH EAG 
-     LIPID PANEL 
-     METABOLIC PANEL, COMPREHENSIVE 
-     TSH 3RD GENERATION 11. SSS (sick sinus syndrome) ([facility] [address] 12. S/P cardiac pacemaker procedure - Stable, followed by Cardiology and EP 13. Essential hypertension - controlled -     METABOLIC PANEL, COMPREHENSIVE 14. Encounter for long-term (current) use of medications 
-     HEMOGLOBIN A1C WITH EAG 
-     LIPID PANEL 
-     METABOLIC PANEL, COMPREHENSIVE 
-     TSH 3RD GENERATION We will plan for Prevnar 13 at next appointment I have discussed the diagnosis with the patient and the intended plan as seen in the above orders. The patient has received an after-visit summary and questions were answered concerning future plans. I have discussed medication side effects and warnings with the patient as well. Follow-up and Dispositions · Return in about 6 months (around 10/5/2019), or if symptoms worsen or fail to improve. This is the Subsequent Medicare Annual Wellness Exam, performed 12 months or more after the Initial AWV or the last Subsequent AWV I have reviewed the patient's medical history in detail and updated the computerized patient record. History Past Medical History:  
Diagnosis Date  Acquired cyst of kidney  CAD (coronary artery disease)  Cervical disc herniation  Essential hypertension  Hypercholesterolemia  Hypertension  PAF (paroxysmal atrial fibrillation) ([facility] [address] 6/9/2016  Paroxysmal supraventricular tachycardia ([facility] [address]  Postsurgical aortocoronary bypass status 3/20/2012  S/P cardiac pacemaker procedure 6/9/2016 6/9/16 [employer] dual chamber pacemaker implant  Urge incontinence Past Surgical History:  
Procedure Laterality Date  CABG, ARTERY-VEIN, FOUR  2007  COLONOSCOPY N/A 1/5/2018 COLONOSCOPY performed by Charity Kiran MD at [facility] ENDOSCOPY  ENDOSCOPY, COLON, DIAGNOSTIC    
 due 2012 hx proctitis  HX CERVICAL DISKECTOMY  HX CORONARY ARTERY BYPASS GRAFT    
 HX HEENT Thyroidectomy  HX ORTHOPAEDIC    
 benign thumb tumor  HX PACEMAKER Left 2016  HX UROLOGICAL  2011  
 stent for hydronephrosis Current Outpatient Medications Medication Sig Dispense Refill  losartan-hydroCHLOROthiazide (HYZAAR) 50-12.5 mg per tablet TAKE 1 TABLET BY MOUTH EVERY DAY 90 Tab 3  
 simvastatin (ZOCOR) 40 mg tablet TAKE 1 TABLET BY MOUTH EVERY EVENING 90 Tab 3  
 diclofenac (VOLTAREN) 1 % gel Apply  to affected area four (4) times daily. 300 g 0  
 sotalol (BETAPACE) 80 mg tablet TAKE 1 TABLET BY MOUTH EVERY TWELVE (12) HOURS. 60 Tab 11  
 PSYLLIUM HUSK (DAILY FIBER PO) Take  by mouth daily.  FOLIC ACID/MULTIVIT-MIN/LUTEIN (CENTRUM SILVER PO) Take  by mouth daily.  aspirin (ASPIRIN) 325 mg tablet Take 325 mg by mouth daily.  omega-3 fatty acids-vitamin e (FISH OIL) 1,000 mg Cap Take  by mouth daily. Allergies Allergen Reactions  Ace Inhibitors Cough  Pcn [Penicillins] Anaphylaxis and Hives Just got Rocephin in ED, no reactions  Niaspan [Niacin] Unable to Obtain Family History Problem Relation Age of Onset  Asthma Mother  Hypertension Father  Stroke Father  Cancer Sister   
     breast  
 Diabetes Brother  Heart Disease Sister  Heart Disease Brother Social History Tobacco Use  Smoking status: Former Smoker Packs/day: 0.50 Years: 10.00 Pack years: 5.00 Types: Cigarettes Last attempt to quit: 2007 Years since quittin.8  Smokeless tobacco: Never Used Substance Use Topics  Alcohol use: No  
  Alcohol/week: 0.0 oz Patient Active Problem List  
Diagnosis Code  Cervical disc herniation M50.20  Hypertension I10  
 CAD (coronary artery disease) I25.10  Paroxysmal supraventricular tachycardia (HCC) I47.1  Encounter for long-term (current) use of medications Z79.899  Mixed hyperlipidemia E78.2  Chronic ischemic heart disease I25.9  Postsurgical aortocoronary bypass status Z95.1  Coronary atherosclerosis of native coronary artery I25.10  
 S/P CABG x 4 Z95.1  Urge incontinence N39.41  
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  
 PAF (paroxysmal atrial fibrillation) ([facility]) I48.0  Prediabetes R73.03  
 Severe obesity (BMI 35.0-39. 9) E66.01  
 Pacemaker Z95.0 Depression Risk Factor Screening:  
 
3 most recent PHQ Screens 2019 Little interest or pleasure in doing things Not at all Feeling down, depressed, irritable, or hopeless Not at all Total Score PHQ 2 0 Alcohol Risk Factor Screening: You do not drink alcohol or very rarely. Functional Ability and Level of Safety:  
Hearing Loss Hearing is good. Activities of Daily Living The home contains: no safety equipment. Patient does total self care Fall Risk Fall Risk Assessment, last 12 mths 2019 Able to walk?  Yes  
 Fall in past 12 months? No  
 
 
Abuse Screen Patient is not abused Cognitive Screening Evaluation of Cognitive Function: 
Has your family/caregiver stated any concerns about your memory: no 
Normal, Verbal Fluency Test 
 
Patient Care Team  
Patient Care Team: 
Payton Marie MD as PCP - [facility]) Shobha Rae, RN [facility] as Ambulatory Care Navigator Letha Goldmann, MD (Cardiology) Divya Rosas, RN as Ambulatory Care Navigator (Cardiology) Giuliano Hicks MD as Physician (Cardiology) Alisa Hampton NP as Nurse Practitioner (Nurse Practitioner) Assessment/Plan Education and counseling provided: 
Are appropriate based on today's review and evaluation Diagnoses and all orders for this visit: 
 
1. Medicare annual wellness visit, subsequent 
-     HEMOGLOBIN A1C WITH EAG 
-     LIPID PANEL 
-     METABOLIC PANEL, COMPREHENSIVE 
-     TSH 3RD GENERATION 2. Advanced directives, counseling/discussion 3. Screening for alcoholism -     [place] ANNUAL ALCOHOL SCREEN 15 MIN 4. Screening for depression 
-     [address] 5. Paroxysmal supraventricular tachycardia ([address] 6. PAF (paroxysmal atrial fibrillation) ([address] 7. Coronary artery disease involving coronary bypass graft of native heart without angina pectoris -     LIPID PANEL 
 
8. Chronic ischemic heart disease -     LIPID PANEL 9. S/P CABG x 4 
-     LIPID PANEL 10. Mixed hyperlipidemia 
-     HEMOGLOBIN A1C WITH EAG 
-     LIPID PANEL 
-     METABOLIC PANEL, COMPREHENSIVE 
-     TSH 3RD GENERATION 11. SSS (sick sinus syndrome) ([address] 12. S/P cardiac pacemaker procedure 13. Essential hypertension -     METABOLIC PANEL, COMPREHENSIVE 14. Encounter for long-term (current) use of medications 
-     HEMOGLOBIN A1C WITH EAG 
-     LIPID PANEL 
-     METABOLIC PANEL, COMPREHENSIVE 
-     TSH 3RD GENERATION Health Maintenance Due Topic Date Due  
  MEDICARE YEARLY EXAM  03/23/2019

## 2019-04-05 NOTE — PATIENT INSTRUCTIONS
Medicare Wellness Visit, Female The best way to live healthy is to have a lifestyle where you eat a well-balanced diet, exercise regularly, limit alcohol use, and quit all forms of tobacco/nicotine, if applicable. Regular preventive services are another way to keep healthy. Preventive services (vaccines, screening tests, monitoring & exams) can help personalize your care plan, which helps you manage your own care. Screening tests can find health problems at the earliest stages, when they are easiest to treat. [facility] follows the current, evidence-based guidelines published by the [facility] ([facility]) when recommending preventive services for our patients. Because we follow these guidelines, sometimes recommendations change over time as research supports it. (For example, mammograms used to be recommended annually. Even though Medicare will still pay for an annual mammogram, the newer guidelines recommend a mammogram every two years for women of average risk.) Of course, you and your doctor may decide to screen more often for some diseases, based on your risk and your health status. Preventive services for you include: - Medicare offers their members a free annual wellness visit, which is time for you and your primary care provider to discuss and plan for your preventive service needs. Take advantage of this benefit every year! 
-All adults over the age of 72 should receive the recommended pneumonia vaccines. Current USPSTF guidelines recommend a series of two vaccines for the best pneumonia protection.  
-All adults should have a flu vaccine yearly and a tetanus vaccine every 10 years. All adults age 61 and older should receive a shingles vaccine once in their lifetime.   
-A bone mass density test is recommended when a woman turns 65 to screen for osteoporosis. This test is only recommended one time, as a screening. Some providers will use this same test as a disease monitoring tool if you already have osteoporosis. -All adults age 38-68 who are overweight should have a diabetes screening test once every three years.  
-Other screening tests and preventive services for persons with diabetes include: an eye exam to screen for diabetic retinopathy, a kidney function test, a foot exam, and stricter control over your cholesterol.  
-Cardiovascular screening for adults with routine risk involves an electrocardiogram (ECG) at intervals determined by your doctor.  
-Colorectal cancer screenings should be done for adults age 54-65 with no increased risk factors for colorectal cancer. There are a number of acceptable methods of screening for this type of cancer. Each test has its own benefits and drawbacks. Discuss with your doctor what is most appropriate for you during your annual wellness visit. The different tests include: colonoscopy (considered the best screening method), a fecal occult blood test, a fecal DNA test, and sigmoidoscopy. -Breast cancer screenings are recommended every other year for women of normal risk, age 54-69. 
-Cervical cancer screenings for women over age 72 are only recommended with certain risk factors.  
-All adults born between [facility] should be screened once for Hepatitis C. Here is a list of your current Health Maintenance items (your personalized list of preventive services) with a due date: 
Health Maintenance Due Topic Date Due  
 Annual Well Visit  03/23/2019

## 2019-04-05 NOTE — ACP (ADVANCE CARE PLANNING)
Advance Care Planning    Advance Care Planning (ACP) Provider Conversation Snapshot    Date of ACP Conversation: 04/05/19  Persons included in Conversation:  patient  Length of ACP Conversation in minutes:  <16 minutes (Non-Billable)    Authorized Decision Maker (if patient is incapable of making informed decisions):    This person is:   Healthcare Agent/Medical Power of  under Advance Directive          For Patients with Decision Making Capacity:   Values/Goals: Exploration of values, goals, and preferences if recovery is not expected, even with continued medical treatment in the event of:  Imminent death    Conversation Outcomes / Follow-Up Plan:   Recommended completion of Advance Directive form after review of ACP materials and conversation with prospective healthcare agent

## 2019-04-06 LAB
ALBUMIN SERPL-MCNC: 4.4 G/DL (ref 3.5–4.8)
ALBUMIN/GLOB SERPL: 1.5 {RATIO} (ref 1.2–2.2)
ALP SERPL-CCNC: 90 IU/L (ref 39–117)
ALT SERPL-CCNC: 11 IU/L (ref 0–32)
AST SERPL-CCNC: 17 IU/L (ref 0–40)
BILIRUB SERPL-MCNC: 0.4 MG/DL (ref 0–1.2)
BUN SERPL-MCNC: 12 MG/DL (ref 8–27)
BUN/CREAT SERPL: 15 (ref 12–28)
CALCIUM SERPL-MCNC: 9.3 MG/DL (ref 8.7–10.3)
CHLORIDE SERPL-SCNC: 101 MMOL/L (ref 96–106)
CHOLEST SERPL-MCNC: 127 MG/DL (ref 100–199)
CO2 SERPL-SCNC: 26 MMOL/L (ref 20–29)
CREAT SERPL-MCNC: 0.82 MG/DL (ref 0.57–1)
EST. AVERAGE GLUCOSE BLD GHB EST-MCNC: 123 MG/DL
GLOBULIN SER CALC-MCNC: 2.9 G/DL (ref 1.5–4.5)
GLUCOSE SERPL-MCNC: 83 MG/DL (ref 65–99)
HBA1C MFR BLD: 5.9 % (ref 4.8–5.6)
HDLC SERPL-MCNC: 44 MG/DL
LDLC SERPL CALC-MCNC: 71 MG/DL (ref 0–99)
POTASSIUM SERPL-SCNC: 4.1 MMOL/L (ref 3.5–5.2)
PROT SERPL-MCNC: 7.3 G/DL (ref 6–8.5)
SODIUM SERPL-SCNC: 141 MMOL/L (ref 134–144)
TRIGL SERPL-MCNC: 58 MG/DL (ref 0–149)
TSH SERPL DL<=0.005 MIU/L-ACNC: 3.61 UIU/ML (ref 0.45–4.5)
VLDLC SERPL CALC-MCNC: 12 MG/DL (ref 5–40)

## 2019-04-26 ENCOUNTER — CLINICAL SUPPORT (OUTPATIENT)
Dept: CARDIOLOGY CLINIC | Age: 74
End: 2019-04-26

## 2019-04-26 DIAGNOSIS — Z95.0 CARDIAC PACEMAKER IN SITU: Primary | ICD-10-CM

## 2019-04-26 DIAGNOSIS — I25.9 CHRONIC ISCHEMIC HEART DISEASE: ICD-10-CM

## 2019-04-26 DIAGNOSIS — I49.5 SSS (SICK SINUS SYNDROME) (HCC): ICD-10-CM

## 2019-06-25 ENCOUNTER — OFFICE VISIT (OUTPATIENT)
Dept: CARDIOLOGY CLINIC | Age: 74
End: 2019-06-25

## 2019-06-25 VITALS
RESPIRATION RATE: 16 BRPM | WEIGHT: 191 LBS | HEIGHT: 62 IN | SYSTOLIC BLOOD PRESSURE: 138 MMHG | OXYGEN SATURATION: 96 % | DIASTOLIC BLOOD PRESSURE: 78 MMHG | HEART RATE: 60 BPM | BODY MASS INDEX: 35.15 KG/M2

## 2019-06-25 DIAGNOSIS — I47.1 PAROXYSMAL SUPRAVENTRICULAR TACHYCARDIA (HCC): ICD-10-CM

## 2019-06-25 DIAGNOSIS — Z95.0 S/P CARDIAC PACEMAKER PROCEDURE: ICD-10-CM

## 2019-06-25 DIAGNOSIS — I48.0 PAF (PAROXYSMAL ATRIAL FIBRILLATION) (HCC): ICD-10-CM

## 2019-06-25 DIAGNOSIS — I10 HYPERTENSION, UNSPECIFIED TYPE: ICD-10-CM

## 2019-06-25 DIAGNOSIS — I25.9 CHRONIC ISCHEMIC HEART DISEASE: Primary | ICD-10-CM

## 2019-06-25 DIAGNOSIS — E78.5 DYSLIPIDEMIA: ICD-10-CM

## 2019-06-25 DIAGNOSIS — Z95.1 S/P CABG X 4: ICD-10-CM

## 2019-06-25 DIAGNOSIS — E78.2 MIXED HYPERLIPIDEMIA: ICD-10-CM

## 2019-06-25 DIAGNOSIS — R73.03 PREDIABETES: ICD-10-CM

## 2019-06-25 DIAGNOSIS — I49.1 APC (ATRIAL PREMATURE CONTRACTIONS): ICD-10-CM

## 2019-06-25 RX ORDER — SIMVASTATIN 40 MG/1
40 TABLET, FILM COATED ORAL
Qty: 90 TAB | Refills: 3 | Status: SHIPPED | OUTPATIENT
Start: 2019-06-25 | End: 2020-04-23

## 2019-06-25 NOTE — PROGRESS NOTES
[facility]   [address] [facility], [address] [place] [address]                                          NEW PATIENT HPI/FOLLOW-UP      NAME:  Trace Garcia   :   1945   MRN:   [id number]   PCP:  Blanka Monge MD           Subjective: The patient is a 75y.o. year old female  who returns for a routine follow-up. Since the last visit, patient reports no new symptoms. Wellness eval  satisfactory. Very active. Works at [employer]. Denies change in exercise tolerance, chest pain, edema, medication intolerance, palpitations, shortness of breath, PND/orthopnea wheezing, sputum, syncope, dizziness or light headedness. Doing satisfactorily. Review of Systems  General: Pt denies excessive weight gain or loss. Pt is able to conduct ADL's. Respiratory: Denies shortness of breath, GARZA, wheezing or stridor.   Cardiovascular: Denies precordial pain, palpitations, edema or PND  Gastrointestinal: Denies poor appetite, indigestion, abdominal pain or blood in stool  Peripheral vascular: Denies claudication, leg cramps  Neuropsychiatric: Denies paresthesias,tingling,numbness,anxiety,depression,fatigue  Musculoskeletal: Denies pain,tenderness, soreness,swelling      Past Medical History:   Diagnosis Date    Acquired cyst of kidney     CAD (coronary artery disease)     Cervical disc herniation     Essential hypertension     Hypercholesterolemia     Hypertension     PAF (paroxysmal atrial fibrillation) ([address] 2016    Paroxysmal [facility]ricular tachycardia 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 05/23/2016    APC (atrial premature contractions) 05/23/2016    First degree AV block 05/23/2016    SSS (sick sinus syndrome) ([address] 05/23/2016    Advanced care planning/counseling discussion 03/22/2016    Dyslipidemia 06/12/2014    Bradycardia, sinus 05/10/2013    Urge incontinence     S/P CABG x 4 09/20/2012    Mixed hyperlipidemia 03/20/2012    Chronic ischemic heart disease 03/20/2012    Postsurgical aortocoronary bypass status 03/20/2012    Coronary atherosclerosis of native coronary artery 03/20/2012    Encounter for long-term (current) use of medications 07/28/2010    Cervical disc herniation     Hypertension     CAD (coronary artery disease)     Paroxysmal supraventricular tachycardia [facility])       Past Surgical History:   Procedure Laterality Date    CABG, ARTERY-VEIN, FOUR  2007    COLONOSCOPY N/A 1/5/2018    COLONOSCOPY performed by Angelo Quiñones MD at [facility] ENDOSCOPY    ENDOSCOPY, COLON, DIAGNOSTIC      due 2012 hx proctitis    HX CERVICAL DISKECTOMY      HX CORONARY ARTERY BYPASS GRAFT      HX HEENT      Thyroidectomy    HX ORTHOPAEDIC      benign thumb tumor    HX PACEMAKER Left 2016    HX UROLOGICAL  2011    stent for hydronephrosis     Allergies   Allergen Reactions    Ace Inhibitors Cough    Pcn [Penicillins] Anaphylaxis and Hives     Just got Rocephin in ED, no reactions    Niaspan [Niacin] Unable to Obtain      Family History   Problem Relation Age of Onset    Asthma Mother     Hypertension Father     Stroke Father     Cancer Sister         breast    Diabetes Brother     Heart Disease Sister     Heart Disease Brother       Social History     Socioeconomic History    Marital status:      Spouse name: Not on file    Number of children: Not on file    Years of education: Not on file    Highest education level: Not on file   Occupational History    Not on file   Social Needs    Financial resource strain: Not on file    Food insecurity:     Worry: Not on file     Inability: Not on file    Transportation needs:     Medical: Not on file     Non-medical: Not on file   Tobacco Use    Smoking status: Former Smoker     Packs/day: 0.50     Years: 10.00     Pack years: 5.00     Types: Cigarettes     Last attempt to quit: 2007     Years since quittin.0    Smokeless tobacco: Never Used   Substance and Sexual Activity    Alcohol use: No     Alcohol/week: 0.0 oz    Drug use: No    Sexual activity: Never   Lifestyle    Physical activity:     Days per week: Not on file     Minutes per session: Not on file    Stress: Not on file   Relationships    Social connections:     Talks on phone: Not on file     Gets together: Not on file     Attends Sabianism service: Not on file     Active member of club or organization: Not on file     Attends meetings of clubs or organizations: Not on file     Relationship status: Not on file    Intimate partner violence:     Fear of current or ex partner: Not on file     Emotionally abused: Not on file     Physically abused: Not on file     Forced sexual activity: Not on file   Other Topics Concern    Not on file   Social History Narrative    Not on file      Current Outpatient Medications   Medication Sig    losartan-hydroCHLOROthiazide (HYZAAR) 50-12.5 mg per tablet TAKE 1 TABLET BY MOUTH EVERY DAY    simvastatin (ZOCOR) 40 mg tablet TAKE 1 TABLET BY MOUTH EVERY EVENING    diclofenac (VOLTAREN) 1 % gel Apply  to affected area four (4) times daily.  sotalol (BETAPACE) 80 mg tablet TAKE 1 TABLET BY MOUTH EVERY TWELVE (12) HOURS.  PSYLLIUM HUSK (DAILY FIBER PO) Take  by mouth daily.  FOLIC ACID/MULTIVIT-MIN/LUTEIN (CENTRUM SILVER PO) Take  by mouth daily.  aspirin (ASPIRIN) 325 mg tablet Take 325 mg by mouth daily.  omega-3 fatty acids-vitamin e (FISH OIL) 1,000 mg Cap Take  by mouth daily.      No current facility-administered medications for this visit. I have reviewed the nurses notes, vitals, problem list, allergy list, medical history, family medical, social history and medications. Objective:     Physical Exam:     Vitals:    06/25/19 0915   BP: 138/78   Pulse: 60   Resp: 16   SpO2: 96%   Weight: 191 lb (86.6 kg)   Height: 5' 2\" (1.575 m)    Body mass index is 34.93 kg/m². General: Well developed, in no acute distress. HEENT: No carotid bruits, no JVD, trach is midline. Heart:  Normal S1/S2 negative S3 or S4. Regular, no murmur, gallop or rub.   Respiratory: Clear bilaterally, no wheezing or rales  Abdomen:   Soft, non-tender, bowel sounds are active.   Extremities:  No edema, normal cap refill, no cyanosis. Neuro: A&Ox3, speech clear, gait stable. Skin: Skin color is normal. No rashes or lesions. No diaphoresis.   Vascular: 2+ pulses symmetric in all extremities        Data Review:       Cardiographics:    EKG: Electronic atrial pacemaker,1st degree AV HB,very minor non-specific ST-T wave changes    Cardiology Labs:    Results for orders placed or performed during the hospital encounter of 06/09/16   EKG, 12 LEAD, INITIAL   Result Value Ref Range    Ventricular Rate 62 BPM    Atrial Rate 62 BPM    P-R Interval 260 ms    QRS Duration 80 ms    Q-T Interval 478 ms    QTC Calculation (Bezet) 485 ms    Calculated P Axis 49 degrees    Calculated R Axis 27 degrees    Calculated T Axis 45 degrees    Diagnosis       ** Poor data quality, interpretation may be adversely affected  Sinus rhythm with 1st degree AV block with premature atrial complexes  Nonspecific T wave abnormality Lead V6 is missing   Prolonged QT  When compared with ECG of 09-JUN-2016 13:27,  Previous ECG has undetermined rhythm, needs review  Nonspecific T wave abnormality, improved in Inferior leads  Nonspecific T wave abnormality, improved in Lateral leads  Confirmed by Nikki Whitaker ([id number]) on 6/10/2016 9:53:08 AM     Results for orders placed or performed in visit on 05/23/16   CARDIAC HOLTER MONITOR, 24 HOURS    Narrative    ECG Monitor/24 hours, Complete    Reason for Holter Monitor  ECTOPIC ATRIAL RHYTHM    Heartbeat    Slowest 42  Average 81  Fastest  138        Results:   Underlying Rhythm: Normal sinus rhythm      Atrial Arrhythmias: premature atrial contractions; occasional,  paroxysmal atrial fibrillation, paroxysmal atrial flutter and  severe bradycardia            AV Conduction: normal    Ventricular Arrhythmias: premature ventricular contractions;  occasional     ST Segment Analysis:normal     Symptom Correlation:  None reported    Comment:   Sinus rhythm with\ profound daytime bradycardia in the 40s and  pafib/pafl with rvr. Clinical correlation advised. Kit Strange MD, [facility]          Lab Results   Component Value Date/Time    Cholesterol, total 127 04/05/2019 09:11 AM    HDL Cholesterol 44 04/05/2019 09:11 AM    LDL, calculated 71 04/05/2019 09:11 AM    Triglyceride 58 04/05/2019 09:11 AM    CHOL/HDL Ratio 3.4 07/28/2010 11:07 AM       Lab Results   Component Value Date/Time    Sodium 141 04/05/2019 09:11 AM    Potassium 4.1 04/05/2019 09:11 AM    Chloride 101 04/05/2019 09:11 AM    CO2 26 04/05/2019 09:11 AM    Anion gap 7 06/10/2016 05:10 AM    Glucose 83 04/05/2019 09:11 AM    BUN 12 04/05/2019 09:11 AM    Creatinine 0.82 04/05/2019 09:11 AM    BUN/Creatinine ratio 15 04/05/2019 09:11 AM    GFR est AA 82 04/05/2019 09:11 AM    GFR est non-AA 71 04/05/2019 09:11 AM    Calcium 9.3 04/05/2019 09:11 AM    Bilirubin, total 0.4 04/05/2019 09:11 AM    AST (SGOT) 17 04/05/2019 09:11 AM    Alk. phosphatase 90 04/05/2019 09:11 AM    Protein, total 7.3 04/05/2019 09:11 AM    Albumin 4.4 04/05/2019 09:11 AM    Globulin 4.0 05/03/2011 04:50 AM    A-G Ratio 1.5 04/05/2019 09:11 AM    ALT (SGPT) 11 04/05/2019 09:11 AM          Assessment:       ICD-10-CM ICD-9-CM    1.  Chronic ischemic heart disease I25.9 414.9 AMB POC EKG ROUTINE W/ 12 LEADS, INTER & REP      simvastatin (ZOCOR) 40 mg tablet   2. Paroxysmal supraventricular tachycardia (HCC) I47.1 427.0 AMB POC EKG ROUTINE W/ 12 LEADS, INTER & REP   3. Hypertension, unspecified type I10 401.9 AMB POC EKG ROUTINE W/ 12 LEADS, INTER & REP   4. APC (atrial premature contractions) I49.1 427.61 AMB POC EKG ROUTINE W/ 12 LEADS, INTER & REP   5. PAF (paroxysmal atrial fibrillation) (HCC) I48.0 427.31    6. Mixed hyperlipidemia E78.2 272.2    7. S/P cardiac pacemaker procedure Z95.0 V45.01    8. S/P CABG x 4 Z95.1 V45.81 simvastatin (ZOCOR) 40 mg tablet   9. Prediabetes R73.03 790.29    10. Dyslipidemia E78.5 272.4 simvastatin (ZOCOR) 40 mg tablet         Discussion: Patient presents at this time stable from a cardiac perspective. No active cardiac symptoms. Very active without restrictions. Pleased with present cardiac status. Plan: 1. Continue same meds. Lipid profile and labs followed by PCP--AT GOAL. 2.Encouraged to exercise to tolerance and follow low fat, low carb,low cholesterol, low sodium predominantly Plant-based (consider Mediterranean) diet. Call with questions or concerns. Will follow up any test results by phone and/or f/u here in office if needed. Roopa Ledesma 3.Follow up: 6 months    I have discussed the diagnosis with the patient and the intended plan as seen in the above orders. The patient has received an after-visit summary and questions were answered concerning future plans. I have discussed any concerning medication side effects and warnings with the patient as well.     Michelle Bal MD  6/25/2019

## 2019-07-26 ENCOUNTER — TELEPHONE (OUTPATIENT)
Dept: CARDIOLOGY CLINIC | Age: 74
End: 2019-07-26

## 2019-07-26 NOTE — TELEPHONE ENCOUNTER
Called, spoke to pt, two identifiers verified. Asked pt to send a manual transmission from her remote box. States she will send as soon as she gets home. Pt verbalized understanding of information discussed w/ no further questions at this time.      Russ Montano RN

## 2019-08-05 ENCOUNTER — TELEPHONE (OUTPATIENT)
Dept: FAMILY MEDICINE CLINIC | Age: 74
End: 2019-08-05

## 2019-08-05 ENCOUNTER — CLINICAL SUPPORT (OUTPATIENT)
Dept: CARDIOLOGY CLINIC | Age: 74
End: 2019-08-05

## 2019-08-05 DIAGNOSIS — I49.5 SSS (SICK SINUS SYNDROME) (HCC): ICD-10-CM

## 2019-08-05 DIAGNOSIS — Z95.0 CARDIAC PACEMAKER IN SITU: Primary | ICD-10-CM

## 2019-08-05 NOTE — TELEPHONE ENCOUNTER
----- Message from Billy Lee sent at 8/5/2019  1:55 PM EDT -----  Regarding: Deirdre Dale / Mik Mosley   General Message/Vendor Calls      Caller's first and last name:  Rich Trujillo    Reason for call:      Antionette Jalloh required yes and why:  patient is requesting a referral to go see a electrophysiologist specialist that goes by the name of Keely Fulton ([id number])      Best contact number(s): [phone]      Details to clarify the request:   patient is requesting a referral to go see a electrophysiologist specialist that goes by the name of Keely Fulton ([phone]) fax# [phone]

## 2019-08-15 RX ORDER — SOTALOL HYDROCHLORIDE 80 MG/1
80 TABLET ORAL 2 TIMES DAILY
Qty: 60 TAB | Refills: 11 | Status: SHIPPED | OUTPATIENT
Start: 2019-08-15 | End: 2020-07-15 | Stop reason: SDUPTHER

## 2019-08-27 ENCOUNTER — CLINICAL SUPPORT (OUTPATIENT)
Dept: CARDIOLOGY CLINIC | Age: 74
End: 2019-08-27

## 2019-08-27 DIAGNOSIS — I49.5 SSS (SICK SINUS SYNDROME) (HCC): ICD-10-CM

## 2019-08-27 DIAGNOSIS — Z95.0 CARDIAC PACEMAKER IN SITU: Primary | ICD-10-CM

## 2019-10-11 ENCOUNTER — OFFICE VISIT (OUTPATIENT)
Dept: FAMILY MEDICINE CLINIC | Age: 74
End: 2019-10-11

## 2019-10-11 VITALS
TEMPERATURE: 97 F | HEIGHT: 62 IN | HEART RATE: 61 BPM | WEIGHT: 192.6 LBS | SYSTOLIC BLOOD PRESSURE: 137 MMHG | BODY MASS INDEX: 35.44 KG/M2 | OXYGEN SATURATION: 94 % | RESPIRATION RATE: 16 BRPM | DIASTOLIC BLOOD PRESSURE: 78 MMHG

## 2019-10-11 DIAGNOSIS — I25.9 CHRONIC ISCHEMIC HEART DISEASE: ICD-10-CM

## 2019-10-11 DIAGNOSIS — I48.0 PAF (PAROXYSMAL ATRIAL FIBRILLATION) (HCC): ICD-10-CM

## 2019-10-11 DIAGNOSIS — Z95.0 S/P CARDIAC PACEMAKER PROCEDURE: ICD-10-CM

## 2019-10-11 DIAGNOSIS — Z79.899 ENCOUNTER FOR LONG-TERM (CURRENT) USE OF MEDICATIONS: ICD-10-CM

## 2019-10-11 DIAGNOSIS — I10 ESSENTIAL HYPERTENSION: Primary | ICD-10-CM

## 2019-10-11 DIAGNOSIS — Z95.1 S/P CABG X 4: ICD-10-CM

## 2019-10-11 DIAGNOSIS — E78.2 MIXED HYPERLIPIDEMIA: ICD-10-CM

## 2019-10-11 DIAGNOSIS — I25.810 CORONARY ARTERY DISEASE INVOLVING CORONARY BYPASS GRAFT OF NATIVE HEART WITHOUT ANGINA PECTORIS: ICD-10-CM

## 2019-10-11 DIAGNOSIS — Z95.1 POSTSURGICAL AORTOCORONARY BYPASS STATUS: ICD-10-CM

## 2019-10-11 DIAGNOSIS — I47.1 PAROXYSMAL SUPRAVENTRICULAR TACHYCARDIA (HCC): ICD-10-CM

## 2019-10-11 DIAGNOSIS — I49.5 SSS (SICK SINUS SYNDROME) (HCC): ICD-10-CM

## 2019-10-11 NOTE — PROGRESS NOTES
Subjective:     Chief Complaint   Patient presents with    Hypertension     6 month follow-up        She  is a 75 y.o. female who presents for evaluation of:    Hyperlipidemia & HTN - also has hx of PSVT and CAD s/p 4vCABG and followed by Dr. Mike Barnard - last appt was 6/20/19 and doing well. Pt is doing well on current meds with no medication side effects noted. BPs controlled at home   no new myalgias, no joint pains, no weakness  No TIA's, no chest pain on exertion, no dyspnea on exertion, no swelling of ankles. Exercising - walking daily, stays active with work  Dieting - Yes, drinks some sweet tea, but avoids fast foods and sodas.   Smoking - No     Lab Results   Component Value Date/Time    Cholesterol, total 127 04/05/2019 09:11 AM    HDL Cholesterol 44 04/05/2019 09:11 AM    LDL, calculated 71 04/05/2019 09:11 AM    Triglyceride 58 04/05/2019 09:11 AM     ROS  Gen - no fever/chills  Resp - no dyspnea or cough  CV - no chest pain or GARZA  Rest per HPI     Objective:     Vitals:    10/11/19 1049 10/11/19 1121 10/11/19 1154   BP: 149/76 143/84 137/78   Pulse: 60  61   Resp: 16     Temp: 97 °F (36.1 °C)     TempSrc: Oral     SpO2: 94%     Weight: 192 lb 9.6 oz (87.4 kg)     Height: 5' 2\" (1.575 m)       Physical Examination:  General appearance - alert, well appearing, and in no distress  Eyes -sclera anicteric  Neck - supple, no significant adenopathy, no thyromegaly, no bruits  Chest - clear to auscultation, no wheezes, rales or rhonchi, symmetric air entry  Heart - normal rate, regular rhythm, normal S1, S2, no murmurs, rubs, clicks or gallops, + pacemaker  Neurological - alert, oriented, normal speech, no focal findings or movement disorder noted  Extr - trace edema bilat  Skin - central chest surgical scar noted  Psych - nml mood and affect    Past Medical History:   Diagnosis Date    Acquired cyst of kidney     CAD (coronary artery disease)     Cervical disc herniation     Essential hypertension  Hypercholesterolemia     Hypertension     PAF (paroxysmal atrial fibrillation) ([facility] [address] 6/9/2016    Paroxysmal supraventricular tachycardia ([facility] [address]     Postsurgical aortocoronary bypass status 3/20/2012    S/P cardiac pacemaker procedure 6/9/2016 6/9/16 [employer] dual chamber pacemaker implant    Urge incontinence      Past Surgical History:   Procedure Laterality Date    CABG, ARTERY-VEIN, FOUR  2007    COLONOSCOPY N/A 1/5/2018    COLONOSCOPY performed by Kianna Crews MD at [facility] ENDOSCOPY    ENDOSCOPY, COLON, DIAGNOSTIC      due 2012 hx proctitis    HX CERVICAL DISKECTOMY      HX CORONARY ARTERY BYPASS GRAFT      HX HEENT      Thyroidectomy    HX ORTHOPAEDIC      benign thumb tumor    HX PACEMAKER Left 2016    HX UROLOGICAL  2011    stent for hydronephrosis     Current Outpatient Medications on File Prior to Visit   Medication Sig Dispense Refill    sotalol (BETAPACE) 80 mg tablet Take 1 Tab by mouth two (2) times a day. 60 Tab 11    simvastatin (ZOCOR) 40 mg tablet Take 1 Tab by mouth nightly. 90 Tab 3    losartan-hydroCHLOROthiazide (HYZAAR) 50-12.5 mg per tablet TAKE 1 TABLET BY MOUTH EVERY DAY 90 Tab 3    diclofenac (VOLTAREN) 1 % gel Apply  to affected area four (4) times daily. 300 g 0    PSYLLIUM HUSK (DAILY FIBER PO) Take  by mouth daily.  FOLIC ACID/MULTIVIT-MIN/LUTEIN (CENTRUM SILVER PO) Take  by mouth daily.  aspirin (ASPIRIN) 325 mg tablet Take 325 mg by mouth daily.  omega-3 fatty acids-vitamin e (FISH OIL) 1,000 mg Cap Take  by mouth daily. No current facility-administered medications on file prior to visit. Allergies   Allergen Reactions    Ace Inhibitors Cough    Pcn [Penicillins] Anaphylaxis and Hives     Just got Rocephin in ED, no reactions    Niaspan [Niacin] Unable to Obtain       Assessment/ Plan:   Diagnoses and all orders for this visit:    1. Essential hypertensionblood pressure improved on repeat    2.  Chronic ischemic heart disease  3. Mixed hyperlipidemia  4. Coronary artery disease involving coronary bypass graft of native heart without angina pectoris  5. S/P CABG x 4  6. Postsurgical aortocoronary bypass status  Doing well overall, followed by Dr. Alcon Cain    7. PAF (paroxysmal atrial fibrillation) ([facility] [address]  8. SSS (sick sinus syndrome) (HCC)  9. Paroxysmal supraventricular tachycardia ([facility] [address]  10. S/P cardiac pacemaker procedure  Doing well, followed by Dr. Jaquez Persons  -     METABOLIC PANEL, COMPREHENSIVE  -     CBC W/O DIFF      11. Encounter for long-term (current) use of medications  -     HEMOGLOBIN A1C WITH EAG  -     LIPID PANEL  -     METABOLIC PANEL, COMPREHENSIVE  -     CBC W/O DIFF    We will plan for Prevnar 13 at next appointment    I have discussed the diagnosis with the patient and the intended plan as seen in the above orders. The patient has received an after-visit summary and questions were answered concerning future plans. I have discussed medication side effects and warnings with the patient as well. Follow-up and Dispositions    · Return in about 6 months (around 4/11/2020), or if symptoms worsen or fail to improve.

## 2019-10-11 NOTE — PROGRESS NOTES
Chief Complaint   Patient presents with    Hypertension     6 month follow-up   1. Have you been to the ER, urgent care clinic since your last visit? Hospitalized since your last visit? No    2. Have you seen or consulted any other health care providers outside of the [address] [place] [address] since your last visit? Include any pap smears or colon screening.  No   No concerns voiced

## 2019-10-12 LAB
ALBUMIN SERPL-MCNC: 4.3 G/DL (ref 3.5–4.8)
ALBUMIN/GLOB SERPL: 1.5 {RATIO} (ref 1.2–2.2)
ALP SERPL-CCNC: 88 IU/L (ref 39–117)
ALT SERPL-CCNC: 14 IU/L (ref 0–32)
AST SERPL-CCNC: 23 IU/L (ref 0–40)
BILIRUB SERPL-MCNC: 0.5 MG/DL (ref 0–1.2)
BUN SERPL-MCNC: 15 MG/DL (ref 8–27)
BUN/CREAT SERPL: 17 (ref 12–28)
CALCIUM SERPL-MCNC: 9.5 MG/DL (ref 8.7–10.3)
CHLORIDE SERPL-SCNC: 102 MMOL/L (ref 96–106)
CHOLEST SERPL-MCNC: 123 MG/DL (ref 100–199)
CO2 SERPL-SCNC: 22 MMOL/L (ref 20–29)
CREAT SERPL-MCNC: 0.89 MG/DL (ref 0.57–1)
ERYTHROCYTE [DISTWIDTH] IN BLOOD BY AUTOMATED COUNT: 15.3 % (ref 12.3–15.4)
EST. AVERAGE GLUCOSE BLD GHB EST-MCNC: 117 MG/DL
GLOBULIN SER CALC-MCNC: 2.9 G/DL (ref 1.5–4.5)
GLUCOSE SERPL-MCNC: 75 MG/DL (ref 65–99)
HBA1C MFR BLD: 5.7 % (ref 4.8–5.6)
HCT VFR BLD AUTO: 38.1 % (ref 34–46.6)
HDLC SERPL-MCNC: 44 MG/DL
HGB BLD-MCNC: 12.7 G/DL (ref 11.1–15.9)
LDLC SERPL CALC-MCNC: 69 MG/DL (ref 0–99)
MCH RBC QN AUTO: 27 PG (ref 26.6–33)
MCHC RBC AUTO-ENTMCNC: 33.3 G/DL (ref 31.5–35.7)
MCV RBC AUTO: 81 FL (ref 79–97)
PLATELET # BLD AUTO: 200 X10E3/UL (ref 150–450)
POTASSIUM SERPL-SCNC: 4.4 MMOL/L (ref 3.5–5.2)
PROT SERPL-MCNC: 7.2 G/DL (ref 6–8.5)
RBC # BLD AUTO: 4.7 X10E6/UL (ref 3.77–5.28)
SODIUM SERPL-SCNC: 142 MMOL/L (ref 134–144)
TRIGL SERPL-MCNC: 49 MG/DL (ref 0–149)
VLDLC SERPL CALC-MCNC: 10 MG/DL (ref 5–40)
WBC # BLD AUTO: 2.8 X10E3/UL (ref 3.4–10.8)

## 2019-10-31 ENCOUNTER — CLINICAL SUPPORT (OUTPATIENT)
Dept: CARDIOLOGY CLINIC | Age: 74
End: 2019-10-31

## 2019-10-31 ENCOUNTER — OFFICE VISIT (OUTPATIENT)
Dept: CARDIOLOGY CLINIC | Age: 74
End: 2019-10-31

## 2019-10-31 VITALS
RESPIRATION RATE: 16 BRPM | HEIGHT: 62 IN | OXYGEN SATURATION: 98 % | SYSTOLIC BLOOD PRESSURE: 160 MMHG | WEIGHT: 191.2 LBS | HEART RATE: 61 BPM | BODY MASS INDEX: 35.19 KG/M2 | DIASTOLIC BLOOD PRESSURE: 92 MMHG

## 2019-10-31 DIAGNOSIS — I49.5 SSS (SICK SINUS SYNDROME) (HCC): ICD-10-CM

## 2019-10-31 DIAGNOSIS — E78.2 MIXED HYPERLIPIDEMIA: ICD-10-CM

## 2019-10-31 DIAGNOSIS — Z95.0 CARDIAC PACEMAKER IN SITU: Primary | ICD-10-CM

## 2019-10-31 DIAGNOSIS — Z95.0 PACEMAKER: ICD-10-CM

## 2019-10-31 DIAGNOSIS — I48.0 PAF (PAROXYSMAL ATRIAL FIBRILLATION) (HCC): ICD-10-CM

## 2019-10-31 DIAGNOSIS — I10 HYPERTENSION, UNSPECIFIED TYPE: ICD-10-CM

## 2019-10-31 DIAGNOSIS — I25.9 CHRONIC ISCHEMIC HEART DISEASE: Primary | ICD-10-CM

## 2019-10-31 NOTE — PROGRESS NOTES
Subjective:      Oliver Villalba is a 75 y.o. female is here for EP consult. The patient denies chest pain/ shortness of breath, orthopnea, PND, LE edema, palpitations, syncope, presyncope or fatigue.        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 05/23/2016    APC (atrial premature contractions) 05/23/2016    First degree AV block 05/23/2016    SSS (sick sinus syndrome) ([facility] [address] 05/23/2016    Advanced care planning/counseling discussion 03/22/2016    Dyslipidemia 06/12/2014    Bradycardia, sinus 05/10/2013    Urge incontinence     S/P CABG x 4 09/20/2012    Mixed hyperlipidemia 03/20/2012    Chronic ischemic heart disease 03/20/2012    Postsurgical aortocoronary bypass status 03/20/2012    Coronary atherosclerosis of native coronary artery 03/20/2012    Encounter for long-term (current) use of medications 07/28/2010    Cervical disc herniation     Hypertension     CAD (coronary artery disease)     Paroxysmal supraventricular tachycardia (HCC)       Alexi Ashton MD  Past Medical History:   Diagnosis Date    Acquired cyst of kidney     CAD (coronary artery disease)     Cervical disc herniation     Essential hypertension     Hypercholesterolemia     Hypertension     PAF (paroxysmal atrial fibrillation) ([address] 6/9/2016    Paroxysmal supraventricular tachycardia ([facility] [address]     Postsurgical aortocoronary bypass status 3/20/2012    S/P cardiac pacemaker procedure 6/9/2016 6/9/16 [employer] dual chamber pacemaker implant    Urge incontinence       Past Surgical History:   Procedure Laterality Date    CABG, ARTERY-VEIN, FOUR  2007    COLONOSCOPY N/A 1/5/2018    COLONOSCOPY performed by Matias Gilford, [facility] at [facility] ENDOSCOPY    ENDOSCOPY, COLON, DIAGNOSTIC      due  hx proctitis    HX CERVICAL DISKECTOMY      HX CORONARY ARTERY BYPASS GRAFT      HX HEENT      Thyroidectomy    HX ORTHOPAEDIC      benign thumb tumor    HX PACEMAKER Left 2016    HX UROLOGICAL  2011    stent for hydronephrosis     Allergies   Allergen Reactions    Ace Inhibitors Cough    Pcn [Penicillins] Anaphylaxis and Hives     Just got Rocephin in ED, no reactions    Niaspan [Niacin] Unable to Obtain      Family History   Problem Relation Age of Onset    Asthma Mother     Hypertension Father     Stroke Father     Cancer Sister         breast    Diabetes Brother     Heart Disease Sister     Heart Disease Brother     negative for cardiac disease  Social History     Socioeconomic History    Marital status:      Spouse name: Not on file    Number of children: Not on file    Years of education: Not on file    Highest education level: Not on file   Tobacco Use    Smoking status: Former Smoker     Packs/day: 0.50     Years: 10.00     Pack years: 5.00     Types: Cigarettes     Last attempt to quit: 2007     Years since quittin.4    Smokeless tobacco: Never Used   Substance and Sexual Activity    Alcohol use: No     Alcohol/week: 0.0 standard drinks    Drug use: No    Sexual activity: Never     Current Outpatient Medications   Medication Sig    sotalol (BETAPACE) 80 mg tablet Take 1 Tab by mouth two (2) times a day.  simvastatin (ZOCOR) 40 mg tablet Take 1 Tab by mouth nightly.  losartan-hydroCHLOROthiazide (HYZAAR) 50-12.5 mg per tablet TAKE 1 TABLET BY MOUTH EVERY DAY    PSYLLIUM HUSK (DAILY FIBER PO) Take  by mouth daily.  FOLIC ACID/MULTIVIT-MIN/LUTEIN (CENTRUM SILVER PO) Take  by mouth daily.  aspirin (ASPIRIN) 325 mg tablet Take 325 mg by mouth daily.  omega-3 fatty acids-vitamin e (FISH OIL) 1,000 mg Cap Take  by mouth daily.     diclofenac (VOLTAREN) 1 % gel Apply  to affected area four (4) times daily. No current facility-administered medications for this visit. Vitals:    10/31/19 0957   BP: (!) 160/92   Pulse: 61   Resp: 16   SpO2: 98%   Weight: 191 lb 3.2 oz (86.7 kg)   Height: 5' 2\" (1.575 m)       I have reviewed the nurses notes, vitals, problem list, allergy list, medical history, family, social history and medications. Review of Symptoms:    General: Pt denies excessive weight gain or loss. Pt is able to conduct ADL's  HEENT: Denies blurred vision, headaches, epistaxis and difficulty swallowing. Respiratory: Denies shortness of breath, GARZA, wheezing or stridor. Cardiovascular: Denies precordial pain, palpitations, edema or PND  Gastrointestinal: Denies poor appetite, indigestion, abdominal pain or blood in stool  Urinary: Denies dysuria, pyuria  Musculoskeletal: Denies pain or swelling from muscles or joints  Neurologic: Denies tremor, paresthesias, or sensory motor disturbance  Skin: Denies rash, itching or texture change. Psych: Denies depression    Physical Exam:      General: Well developed, in no acute distress. HEENT: Eyes - PERRL, no jvd  Heart:  Normal S1/S2 negative S3 or S4. Regular, no murmur, gallop or rub. Respiratory: Clear bilaterally x 4, no wheezing or rales  Extremities:  No edema, normal cap refill, no cyanosis. Musculoskeletal: No clubbing  Neuro: A&Ox3, speech clear, gait stable. Skin: Skin color is normal. No rashes or lesions.  Non diaphoretic  Vascular: 2+ pulses symmetric in all extremities    Cardiographics    Ekg: Sinus  Rhythm  -First degree A-V block   Dashawn = 274    Results for orders placed or performed during the hospital encounter of 06/09/16   EKG, 12 LEAD, INITIAL   Result Value Ref Range    Ventricular Rate 62 BPM    Atrial Rate 62 BPM    P-R Interval 260 ms    QRS Duration 80 ms    Q-T Interval 478 ms    QTC Calculation (Bezet) 485 ms    Calculated P Axis 49 degrees    Calculated R Axis 27 degrees    Calculated T Axis 45 degrees    Diagnosis ** Poor data quality, interpretation may be adversely affected  Sinus rhythm with 1st degree AV block with premature atrial complexes  Nonspecific T wave abnormality Lead V6 is missing   Prolonged QT  When compared with ECG of 09-JUN-2016 13:27,  Previous ECG has undetermined rhythm, needs review  Nonspecific T wave abnormality, improved in Inferior leads  Nonspecific T wave abnormality, improved in Lateral leads  Confirmed by Ananda Kincaid ([id number]) on 6/10/2016 9:53:08 AM     Results for orders placed or performed in visit on 05/23/16   CARDIAC HOLTER MONITOR, 24 HOURS    Narrative    ECG Monitor/24 hours, Complete    Reason for Holter Monitor  ECTOPIC ATRIAL RHYTHM    Heartbeat    Slowest 42  Average 81  Fastest  138        Results:   Underlying Rhythm: Normal sinus rhythm      Atrial Arrhythmias: premature atrial contractions; occasional,  paroxysmal atrial fibrillation, paroxysmal atrial flutter and  severe bradycardia            AV Conduction: normal    Ventricular Arrhythmias: premature ventricular contractions;  occasional     ST Segment Analysis:normal     Symptom Correlation:  None reported    Comment:   Sinus rhythm with\ profound daytime bradycardia in the 40s and  pafib/pafl with rvr. Clinical correlation advised.      Bartolo Cano MD, [facility]            Lab Results   Component Value Date/Time    WBC 2.8 (L) 10/11/2019 12:04 PM    HGB 12.7 10/11/2019 12:04 PM    HCT 38.1 10/11/2019 12:04 PM    PLATELET 787 61/20/3260 12:04 PM    MCV 81 10/11/2019 12:04 PM      Lab Results   Component Value Date/Time    Sodium 142 10/11/2019 12:04 PM    Potassium 4.4 10/11/2019 12:04 PM    Chloride 102 10/11/2019 12:04 PM    CO2 22 10/11/2019 12:04 PM    Anion gap 7 06/10/2016 05:10 AM    Glucose 75 10/11/2019 12:04 PM    BUN 15 10/11/2019 12:04 PM    Creatinine 0.89 10/11/2019 12:04 PM    BUN/Creatinine ratio 17 10/11/2019 12:04 PM    GFR est AA 73 10/11/2019 12:04 PM    GFR est non-AA 64 10/11/2019 12:04 PM Calcium 9.5 10/11/2019 12:04 PM    Bilirubin, total 0.5 10/11/2019 12:04 PM    AST (SGOT) 23 10/11/2019 12:04 PM    Alk. phosphatase 88 10/11/2019 12:04 PM    Protein, total 7.2 10/11/2019 12:04 PM    Albumin 4.3 10/11/2019 12:04 PM    Globulin 4.0 05/03/2011 04:50 AM    A-G Ratio 1.5 10/11/2019 12:04 PM    ALT (SGPT) 14 10/11/2019 12:04 PM      Lab Results   Component Value Date/Time    TSH 3.610 04/05/2019 09:11 AM        Assessment:             ICD-10-CM ICD-9-CM    1. Chronic ischemic heart disease I25.9 414.9 AMB POC EKG ROUTINE W/ 12 LEADS, INTER & REP   2. Mixed hyperlipidemia E78.2 272.2 AMB POC EKG ROUTINE W/ 12 LEADS, INTER & REP   3. Pacemaker Z95.0 V45.01 AMB POC EKG ROUTINE W/ 12 LEADS, INTER & REP   4. PAF (paroxysmal atrial fibrillation) ([facility]) I48.0 427.31    5. SSS (sick sinus syndrome) ([facility]) I49.5 427.81    6. Hypertension, unspecified type I10 401.9      Orders Placed This Encounter    AMB POC EKG ROUTINE W/ 12 LEADS, INTER & REP     Order Specific Question:   Reason for Exam:     Answer:   routine        Plan:     Ms. Lisa Alberto is here for annual device check and  follow up. She is doing well. EKG demonstrates ventricular pacing and device interrogation reveals normal functioning 75% AP, 5% RVP). RV lead unipolar with good thresholds. Will continue to follow. Bps high however she reports monitoring them at home. Advised her to follow up with Dr Cori Montenegro and PCP if readings > 140/90s. Continue current medical therapy, enrolled in remote monitoring and we will see her back in 1 year. Continue medical management for PAF, HTN. Thank you for allowing me to participate in Jeremy Frances 's care. Jeannie Silverman NP    Patient seen and examined. All pertinent data reviewed. I have reviewed detailed note as outlined by Jeannie Silverman NP. Case discussed with Nursing/medical assistant staff and Jeannie Silverman NP. Plans as outlined. A paced 75% for sick sinus.  Cont med rx for htn and hyperlipidemia. F/u in one year.     Dede Tijerina MD, [facility]

## 2019-10-31 NOTE — PROGRESS NOTES
1. Have you been to the ER, urgent care clinic since your last visit? Hospitalized since your last visit? No.    2. Have you seen or consulted any other health care providers outside of the [address] [place] [address] since your last visit? Include any pap smears or colon screening.    No.        Chief Complaint   Patient presents with    Annual Exam     and device check- pt denies any cardiac symptoms

## 2020-01-07 ENCOUNTER — OFFICE VISIT (OUTPATIENT)
Dept: CARDIOLOGY CLINIC | Age: 75
End: 2020-01-07

## 2020-01-07 ENCOUNTER — TELEPHONE (OUTPATIENT)
Dept: CARDIOLOGY CLINIC | Age: 75
End: 2020-01-07

## 2020-01-07 VITALS
HEART RATE: 58 BPM | RESPIRATION RATE: 16 BRPM | BODY MASS INDEX: 35.44 KG/M2 | SYSTOLIC BLOOD PRESSURE: 142 MMHG | WEIGHT: 192.6 LBS | HEIGHT: 62 IN | OXYGEN SATURATION: 98 % | DIASTOLIC BLOOD PRESSURE: 89 MMHG

## 2020-01-07 DIAGNOSIS — I48.0 PAF (PAROXYSMAL ATRIAL FIBRILLATION) (HCC): ICD-10-CM

## 2020-01-07 DIAGNOSIS — Z95.0 PACEMAKER: ICD-10-CM

## 2020-01-07 DIAGNOSIS — Z95.1 S/P CABG X 4: Primary | ICD-10-CM

## 2020-01-07 DIAGNOSIS — I25.9 CHRONIC ISCHEMIC HEART DISEASE: ICD-10-CM

## 2020-01-07 DIAGNOSIS — I49.5 SSS (SICK SINUS SYNDROME) (HCC): ICD-10-CM

## 2020-01-07 DIAGNOSIS — I10 HYPERTENSION, UNSPECIFIED TYPE: ICD-10-CM

## 2020-01-07 NOTE — PROGRESS NOTES
HISTORY OF PRESENT ILLNESS  Vernal Cornea is a 75 y.o. female     SUMMARY:   Problem List  Date Reviewed: 1/7/2020          Codes Class Noted    Pacemaker ICD-10-CM: Z95.0  ICD-9-CM: V45.01  10/4/2018        Severe obesity (BMI 35.0-39. 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 ICD-10-CM: I49.8  ICD-9-CM: 427.89  5/23/2016        APC (atrial premature contractions) ICD-10-CM: I49.1  ICD-9-CM: 427.61  5/23/2016        First degree AV block ICD-10-CM: I44.0  ICD-9-CM: 426.11  5/23/2016        [facility] ICD-10-CM: I49.5  ICD-9-CM: 427.81  5/23/2016        Advanced care planning/counseling discussion ICD-10-CM: Z71.89  ICD-9-CM: V65.49  3/22/2016        Dyslipidemia ICD-10-CM: E78.5  ICD-9-CM: 272.4  6/12/2014        Bradycardia, sinus ICD-10-CM: R00.1  ICD-9-CM: 427.89  5/10/2013        Urge incontinence ICD-10-CM: N39.41  ICD-9-CM: 788.31  Unknown        S/P CABG x 4 ICD-10-CM: Z95.1  ICD-9-CM: V45.81  9/20/2012    Overview Signed 9/20/2012 10:28 AM by Keri Pablo MD     9/7/12--Dr. Nakita Oscar--LIMA-LAD,SVG D1/D2, SVG PDA-RCA             Mixed hyperlipidemia ICD-10-CM: E40.1  ICD-9-CM: 272.2  3/20/2012        Chronic ischemic heart disease ICD-10-CM: I25.9  ICD-9-CM: 414.9  3/20/2012        Postsurgical aortocoronary bypass status ICD-10-CM: Z95.1  ICD-9-CM: V45.81  3/20/2012        Coronary atherosclerosis of native coronary artery ICD-10-CM: I25.10  ICD-9-CM: 414.01  3/20/2012        Encounter for long-term (current) use of medications ICD-10-CM: Z79.899  ICD-9-CM: V58.69  7/28/2010        Cervical disc herniation ICD-10-CM: M50.20  ICD-9-CM: 722.0  Unknown        Hypertension ICD-10-CM: I10  ICD-9-CM: 401.9  Unknown        CAD (coronary artery disease) ICD-10-CM: I25.10  ICD-9-CM: 414.00  Unknown        Paroxysmal supraventricular tachycardia (HCC) ICD-10-CM: I47.1  ICD-9-CM: 427.0  Unknown              Current Outpatient Medications on File Prior to Visit   Medication Sig    sotalol (BETAPACE) 80 mg tablet Take 1 Tab by mouth two (2) times a day.  simvastatin (ZOCOR) 40 mg tablet Take 1 Tab by mouth nightly.  losartan-hydroCHLOROthiazide (HYZAAR) 50-12.5 mg per tablet TAKE 1 TABLET BY MOUTH EVERY DAY    PSYLLIUM HUSK (DAILY FIBER PO) Take  by mouth daily.  FOLIC ACID/MULTIVIT-MIN/LUTEIN (CENTRUM SILVER PO) Take  by mouth daily.  aspirin (ASPIRIN) 325 mg tablet Take 325 mg by mouth daily.  omega-3 fatty acids-vitamin e (FISH OIL) 1,000 mg Cap Take  by mouth daily.  diclofenac (VOLTAREN) 1 % gel Apply  to affected area four (4) times daily. No current facility-administered medications on file prior to visit. CARDIOLOGY STUDIES TO DATE:  No specialty comments available. Chief Complaint   Patient presents with    Hypertension     6 month follow up     HPI : Ms. Marielena Conn has a prior history of multivessel bypass surgery and a pacemaker for paroxysmal A. fib and sick sinus syndrome. She still works at [address] [place] [address] Avenue gets a lot of walking and walks after work as well with no worrisome symptoms. Most recent lipid profile in October looked outstanding. Her initial blood pressure today is a little elevated.   CARDIAC ROS:   negative for chest pain, dyspnea, palpitations, syncope, orthopnea, paroxysmal nocturnal dyspnea, exertional chest pressure/discomfort, claudication, lower extremity edema    Family History   Problem Relation Age of Onset   [facility] Mother     Hypertension Father     Stroke Father     Cancer Sister         breast    Diabetes Brother     Heart Disease Sister     Heart Disease Brother        Past Medical History:   Diagnosis Date    Acquired cyst of kidney     CAD (coronary artery disease)     Cervical disc herniation     Essential hypertension     Hypercholesterolemia     Hypertension     PAF (paroxysmal atrial fibrillation) ([facility] [address] 6/9/2016    Paroxysmal supraventricular tachycardia ([facility]     Postsurgical aortocoronary bypass status 3/20/2012    S/P cardiac pacemaker procedure 6/9/2016 6/9/16 [employer] dual chamber pacemaker implant    Urge incontinence        GENERAL ROS:  A comprehensive review of systems was negative except for that written in the HPI.     Visit Vitals  /90 (BP 1 Location: Right arm, BP Patient Position: Sitting)   Pulse (!) 58   Resp 16   Ht 5' 2\" (1.575 m)   Wt 192 lb 9.6 oz (87.4 kg)   LMP 08/24/1993   SpO2 98%   BMI 35.23 kg/m²       Wt Readings from Last 3 Encounters:   01/07/20 192 lb 9.6 oz (87.4 kg)   10/31/19 191 lb 3.2 oz (86.7 kg)   10/11/19 192 lb 9.6 oz (87.4 kg)            BP Readings from Last 3 Encounters:   01/07/20 140/90   10/31/19 (!) 160/92   10/11/19 137/78       PHYSICAL EXAM  General appearance: alert, cooperative, no distress, appears stated age  Neurologic: Alert and oriented X 3  Neck: supple, symmetrical, trachea midline, no adenopathy, no carotid bruit and no JVD  Lungs: clear to auscultation bilaterally  Heart: regular rate and rhythm, S1, S2 normal, no murmur, click, rub or gallop  Extremities: extremities normal, atraumatic, no cyanosis or edema    Lab Results   Component Value Date/Time    Cholesterol, total 123 10/11/2019 12:04 PM    Cholesterol, total 127 04/05/2019 09:11 AM    Cholesterol, total 134 03/22/2018 11:34 AM    Cholesterol, total 132 09/21/2017 11:17 AM    Cholesterol, total 151 03/20/2017 12:56 PM    HDL Cholesterol 44 10/11/2019 12:04 PM    HDL Cholesterol 44 04/05/2019 09:11 AM    HDL Cholesterol 44 03/22/2018 11:34 AM    HDL Cholesterol 43 09/21/2017 11:17 AM    HDL Cholesterol 45 03/20/2017 12:56 PM    LDL, calculated 69 10/11/2019 12:04 PM    LDL, calculated 71 04/05/2019 09:11 AM    LDL, calculated 75 03/22/2018 11:34 AM    LDL, calculated 75 09/21/2017 11:17 AM    LDL, calculated 88 03/20/2017 12:56 PM    Triglyceride 49 10/11/2019 12:04 PM    Triglyceride 58 04/05/2019 09:11 AM    Triglyceride 73 03/22/2018 11:34 AM    Triglyceride 68 09/21/2017 11:17 AM    Triglyceride 90 03/20/2017 12:56 PM    CHOL/HDL Ratio 3.4 07/28/2010 11:07 AM    CHOL/HDL Ratio 3.5 08/31/2009 01:31 PM    CHOL/HDL Ratio 3.2 01/21/2009 02:19 PM     ASSESSMENT  Ms. Any Taylor is stable and asymptomatic on a good medical regimen and needs no cardiac testing at this time. We will recheck her blood pressure. current treatment plan is effective, no change in therapy  lab results and schedule of future lab studies reviewed with patient  reviewed diet, exercise and weight control    Encounter Diagnoses   Name Primary?  S/P CABG x 4 Yes    Hypertension, unspecified type     PAF (paroxysmal atrial fibrillation) (HCC)     [facility]     Chronic ischemic heart disease     Pacemaker      No orders of the defined types were placed in this encounter. Follow-up and Dispositions    · Return in about 6 months (around 7/7/2020).          Karla Oviedo MD  1/7/2020

## 2020-01-07 NOTE — TELEPHONE ENCOUNTER
Spoke with patient  Verified patient with 2 patient identifiers    Informed per Dr Vianca Phillips patient to take Losartan-hydrochlorothiazide 50-12.5 mg 2 tablets daily x 1 week due to elevated BP at OV today. Follow up for BP check in 1 week. Patient verbalized understanding.

## 2020-01-14 ENCOUNTER — OFFICE VISIT (OUTPATIENT)
Dept: CARDIOLOGY CLINIC | Age: 75
End: 2020-01-14

## 2020-01-14 VITALS
DIASTOLIC BLOOD PRESSURE: 80 MMHG | HEIGHT: 62 IN | WEIGHT: 190.6 LBS | OXYGEN SATURATION: 100 % | SYSTOLIC BLOOD PRESSURE: 120 MMHG | BODY MASS INDEX: 35.07 KG/M2 | RESPIRATION RATE: 18 BRPM | HEART RATE: 60 BPM

## 2020-01-14 DIAGNOSIS — I10 ESSENTIAL HYPERTENSION: ICD-10-CM

## 2020-01-14 DIAGNOSIS — I10 ESSENTIAL HYPERTENSION: Primary | ICD-10-CM

## 2020-01-14 RX ORDER — LOSARTAN POTASSIUM AND HYDROCHLOROTHIAZIDE 12.5; 5 MG/1; MG/1
2 TABLET ORAL DAILY
Qty: 60 TAB | Refills: 5 | Status: SHIPPED | OUTPATIENT
Start: 2020-01-14 | End: 2020-07-10

## 2020-01-14 NOTE — PROGRESS NOTES
Chief Complaint   Patient presents with    Hypertension     BP check     1. Have you been to the ER, urgent care clinic since your last visit? Hospitalized since your last visit? No    2. Have you seen or consulted any other health care providers outside of the [address] [place] [address] since your last visit? Include any pap smears or colon screening.  No

## 2020-02-10 ENCOUNTER — OFFICE VISIT (OUTPATIENT)
Dept: CARDIOLOGY CLINIC | Age: 75
End: 2020-02-10

## 2020-02-10 DIAGNOSIS — I49.5 SSS (SICK SINUS SYNDROME) (HCC): ICD-10-CM

## 2020-02-10 DIAGNOSIS — Z95.0 CARDIAC PACEMAKER IN SITU: Primary | ICD-10-CM

## 2020-04-17 ENCOUNTER — VIRTUAL VISIT (OUTPATIENT)
Dept: FAMILY MEDICINE CLINIC | Age: 75
End: 2020-04-17

## 2020-04-17 VITALS — DIASTOLIC BLOOD PRESSURE: 74 MMHG | SYSTOLIC BLOOD PRESSURE: 127 MMHG | TEMPERATURE: 98 F

## 2020-04-17 DIAGNOSIS — E78.2 MIXED HYPERLIPIDEMIA: ICD-10-CM

## 2020-04-17 DIAGNOSIS — I25.9 CHRONIC ISCHEMIC HEART DISEASE: ICD-10-CM

## 2020-04-17 DIAGNOSIS — I49.5 SSS (SICK SINUS SYNDROME) (HCC): ICD-10-CM

## 2020-04-17 DIAGNOSIS — I10 ESSENTIAL HYPERTENSION: Primary | ICD-10-CM

## 2020-04-17 DIAGNOSIS — Z95.1 S/P CABG X 4: ICD-10-CM

## 2020-04-17 DIAGNOSIS — I25.810 CORONARY ARTERY DISEASE INVOLVING CORONARY BYPASS GRAFT OF NATIVE HEART WITHOUT ANGINA PECTORIS: ICD-10-CM

## 2020-04-17 DIAGNOSIS — Z95.1 POSTSURGICAL AORTOCORONARY BYPASS STATUS: ICD-10-CM

## 2020-04-17 DIAGNOSIS — I48.0 PAF (PAROXYSMAL ATRIAL FIBRILLATION) (HCC): ICD-10-CM

## 2020-04-17 DIAGNOSIS — I47.1 PAROXYSMAL SUPRAVENTRICULAR TACHYCARDIA (HCC): ICD-10-CM

## 2020-04-17 NOTE — PROGRESS NOTES
Due to technical difficulties, this visit was converted from a audiovideo visit to a telephone call after a few minutes. Consent: Fahad Juarez, who was seen by synchronous (real-time) audio-video technology, and/or her healthcare decision maker, is aware that this patient-initiated, Telehealth encounter on 4/17/2020 is a billable service, with coverage as determined by her insurance carrier. She is aware that she may receive a bill and has provided verbal consent to proceed: Yes. Assessment & Plan:   Diagnoses and all orders for this visit:    1. Essential hypertension    2. Chronic ischemic heart disease    3. Mixed hyperlipidemia    4. Coronary artery disease involving coronary bypass graft of native heart without angina pectoris    5. S/P CABG x 4    6. Postsurgical aortocoronary bypass status    7. PAF (paroxysmal atrial fibrillation) ([address]    8. SSS (sick sinus syndrome) ([facility])    9. Paroxysmal supraventricular tachycardia ([address]      Doing well overall. Setting up for 2-month follow-up with labs. Given significant cardiac history and age, also discussed routine precautions for COVID-19 and patient is adherent. Follow-up and Dispositions    · Return in about 2 months (around 6/17/2020), or if symptoms worsen or fail to improve. 712  Subjective:   Fahad Juarez is a 75 y.o. female who was seen for Hypertension (6 month follow-up)    Patient is doing great with no concerns. Specifically denies any chest pain, shortness of breath, palpitations. Last set of labs was checked back in 10/2019 and reviewed today with patient with no concerns. We will plan to follow back up in person in 2 months after improvement in coronavirus pandemic and plan for labs at that time. Prior to Admission medications    Medication Sig Start Date End Date Taking? Authorizing Provider   losartan-hydroCHLOROthiazide (HYZAAR) 50-12.5 mg per tablet Take 2 Tabs by mouth daily.  1/14/20  Yes Danielle Lares NP sotalol (BETAPACE) 80 mg tablet Take 1 Tab by mouth two (2) times a day. 8/15/19  Yes Gary Murphy MD   simvastatin (ZOCOR) 40 mg tablet Take 1 Tab by mouth nightly. 6/25/19  Yes Gary Murphy MD   PSYLLIUM HUSK (DAILY FIBER PO) Take  by mouth daily. Yes Provider, Historical   FOLIC ACID/MULTIVIT-MIN/LUTEIN (CENTRUM SILVER PO) Take  by mouth daily. Yes Provider, Historical   aspirin (ASPIRIN) 325 mg tablet Take 325 mg by mouth daily. Yes Provider, Historical   omega-3 fatty acids-vitamin e (FISH OIL) 1,000 mg Cap Take  by mouth daily.    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 05/23/2016    APC (atrial premature contractions) 05/23/2016    First degree AV block 05/23/2016    SSS (sick sinus syndrome) ([facility] 05/23/2016    Advanced care planning/counseling discussion 03/22/2016    Dyslipidemia 06/12/2014    Bradycardia, sinus 05/10/2013    Urge incontinence     S/P CABG x 4 09/20/2012    Mixed hyperlipidemia 03/20/2012    Chronic ischemic heart disease 03/20/2012    Postsurgical aortocoronary bypass status 03/20/2012    Coronary atherosclerosis of native coronary artery 03/20/2012    Encounter for long-term (current) use of medications 07/28/2010    Cervical disc herniation     Hypertension     CAD (coronary artery disease)     Paroxysmal supraventricular tachycardia (HCC)      Past Medical History:   Diagnosis Date    Acquired cyst of kidney     CAD (coronary artery disease)     Cervical disc herniation     Essential hypertension     Hypercholesterolemia     Hypertension     PAF (paroxysmal atrial fibrillation) ([facility] 6/9/2016    Paroxysmal supraventricular tachycardia ([facility] [facility]     Postsurgical aortocoronary bypass status 3/20/2012    S/P cardiac pacemaker procedure 6/9/2016 6/9/16 [employer] dual chamber pacemaker implant    Urge incontinence      ROS  Gen - no fever/chills  Resp - no dyspnea or cough  CV - no chest pain or GARZA  Rest per HPI    Objective:   Vital Signs: (As obtained by patient/caregiver at home)  Visit Vitals  /74   Temp 98 °F (36.7 °C) (Oral)   LMP 08/24/1993      Physical exam:  General appearance - alert, well appearing, and in no distress  Eyes -sclera anicteric, no discharge  HEENT normocephalic, atraumatic, moist mucous membranes, no visualized neck mass  Chest -normal respiratory effort, no visualized signs of respiratory distress  Neurological - alert, awake, normal speech, no focal findings or movement disorder noted  Psych - normal mood and affect  Skin no apparent lesions      We discussed the expected course, resolution and complications of the diagnosis(es) in detail. Medication risks, benefits, costs, interactions, and alternatives were discussed as indicated. I advised her to contact the office if her condition worsens, changes or fails to improve as anticipated. She expressed understanding with the diagnosis(es) and plan. Amanda Jung is a 75 y.o. female being evaluated by a video visit encounter for concerns as above. A caregiver was present when appropriate. Due to this being a TeleHealth encounter (During ZMEEU-57 public health emergency), evaluation of the following organ systems was limited: Vitals/Constitutional/EENT/Resp/CV/GI//MS/Neuro/Skin/Heme-Lymph-Imm.   Pursuant to the emergency declaration under the [facility], [address]5 waiver authority and the [employer] and [employer] Supplemental Appropriations Act, this Virtual  Visit was conducted, with patient's (and/or legal guardian's) consent, to reduce the patient's risk of exposure to COVID-19 and provide necessary medical care. Services were provided through a video synchronous discussion virtually to substitute for in-person clinic visit. Patient and provider were located at their individual homes.         Barak Beck MD

## 2020-04-21 ENCOUNTER — HOSPITAL ENCOUNTER (OUTPATIENT)
Age: 75
Setting detail: OBSERVATION
Discharge: HOME | End: 2020-04-23
Attending: EMERGENCY MEDICINE | Admitting: HOSPITALIST
Payer: MEDICARE

## 2020-04-21 DIAGNOSIS — R20.2 NUMBNESS AND TINGLING IN RIGHT HAND: ICD-10-CM

## 2020-04-21 DIAGNOSIS — G45.1 TRANSIENT ISCHEMIC ATTACK INVOLVING LEFT INTERNAL CAROTID ARTERY: ICD-10-CM

## 2020-04-21 DIAGNOSIS — G45.9 TIA (TRANSIENT ISCHEMIC ATTACK): ICD-10-CM

## 2020-04-21 DIAGNOSIS — R47.89 WORD FINDING DIFFICULTY: Primary | ICD-10-CM

## 2020-04-21 DIAGNOSIS — R20.0 NUMBNESS AND TINGLING IN RIGHT HAND: ICD-10-CM

## 2020-04-21 DIAGNOSIS — I65.23 BILATERAL CAROTID ARTERY STENOSIS: ICD-10-CM

## 2020-04-21 PROCEDURE — 99285 EMERGENCY DEPT VISIT HI MDM: CPT

## 2020-04-21 PROCEDURE — 94762 N-INVAS EAR/PLS OXIMTRY CONT: CPT

## 2020-04-21 RX ORDER — CLONIDINE HYDROCHLORIDE 0.1 MG/1
0.2 TABLET ORAL
Status: COMPLETED | OUTPATIENT
Start: 2020-04-21 | End: 2020-04-22

## 2020-04-21 NOTE — PROGRESS NOTES
Left message to call office and schedule follow-up with Dr Ignacia Núñez in July. Spoke with cooper.

## 2020-04-22 ENCOUNTER — APPOINTMENT (OUTPATIENT)
Dept: CT IMAGING | Age: 75
End: 2020-04-22
Attending: EMERGENCY MEDICINE
Payer: MEDICARE

## 2020-04-22 ENCOUNTER — APPOINTMENT (OUTPATIENT)
Dept: CT IMAGING | Age: 75
End: 2020-04-22
Attending: PSYCHIATRY & NEUROLOGY
Payer: MEDICARE

## 2020-04-22 ENCOUNTER — APPOINTMENT (OUTPATIENT)
Dept: VASCULAR SURGERY | Age: 75
End: 2020-04-22
Attending: PSYCHIATRY & NEUROLOGY
Payer: MEDICARE

## 2020-04-22 ENCOUNTER — APPOINTMENT (OUTPATIENT)
Dept: NON INVASIVE DIAGNOSTICS | Age: 75
End: 2020-04-22
Attending: HOSPITALIST
Payer: MEDICARE

## 2020-04-22 PROBLEM — I65.23 BILATERAL CAROTID ARTERY STENOSIS: Status: ACTIVE | Noted: 2020-04-22

## 2020-04-22 PROBLEM — G45.9 TIA (TRANSIENT ISCHEMIC ATTACK): Status: ACTIVE | Noted: 2020-04-22

## 2020-04-22 PROBLEM — G45.1 TRANSIENT ISCHEMIC ATTACK INVOLVING LEFT INTERNAL CAROTID ARTERY: Status: ACTIVE | Noted: 2020-04-22

## 2020-04-22 LAB
ALBUMIN SERPL-MCNC: 4 G/DL (ref 3.5–5)
ALBUMIN/GLOB SERPL: 0.9 {RATIO} (ref 1.1–2.2)
ALP SERPL-CCNC: 100 U/L (ref 45–117)
ALT SERPL-CCNC: 26 U/L (ref 12–78)
AMPHET UR QL SCN: NEGATIVE
ANION GAP SERPL CALC-SCNC: 6 MMOL/L (ref 5–15)
APAP SERPL-MCNC: <2 UG/ML (ref 10–30)
APPEARANCE UR: CLEAR
AST SERPL-CCNC: 27 U/L (ref 15–37)
ATRIAL RATE: 63 BPM
BACTERIA URNS QL MICRO: ABNORMAL /HPF
BARBITURATES UR QL SCN: NEGATIVE
BASOPHILS # BLD: 0 K/UL (ref 0–0.1)
BASOPHILS NFR BLD: 1 % (ref 0–1)
BENZODIAZ UR QL: NEGATIVE
BILIRUB SERPL-MCNC: 0.5 MG/DL (ref 0.2–1)
BILIRUB UR QL: NEGATIVE
BUN SERPL-MCNC: 27 MG/DL (ref 6–20)
BUN/CREAT SERPL: 23 (ref 12–20)
CALCIUM SERPL-MCNC: 9 MG/DL (ref 8.5–10.1)
CALCULATED P AXIS, ECG09: 47 DEGREES
CALCULATED R AXIS, ECG10: 22 DEGREES
CALCULATED T AXIS, ECG11: 31 DEGREES
CANNABINOIDS UR QL SCN: NEGATIVE
CHLORIDE SERPL-SCNC: 104 MMOL/L (ref 97–108)
CHOLEST SERPL-MCNC: 154 MG/DL
CO2 SERPL-SCNC: 28 MMOL/L (ref 21–32)
COCAINE UR QL SCN: NEGATIVE
COLOR UR: ABNORMAL
COMMENT, HOLDF: NORMAL
CREAT SERPL-MCNC: 1.19 MG/DL (ref 0.55–1.02)
DIAGNOSIS, 93000: NORMAL
DIFFERENTIAL METHOD BLD: ABNORMAL
DRUG SCRN COMMENT,DRGCM: NORMAL
ECHO AV PEAK GRADIENT: 6.4 MMHG
ECHO AV PEAK VELOCITY: 126.98 CM/S
ECHO EST RA PRESSURE: 10 MMHG
ECHO LA AREA 4C: 21.6 CM2
ECHO LA VOL 4C: 61.44 ML (ref 22–52)
ECHO LA VOLUME INDEX A4C: 32.48 ML/M2 (ref 16–28)
ECHO LV INTERNAL DIMENSION DIASTOLIC MMODE: 4.5 CM
ECHO LV INTERNAL DIMENSION SYSTOLIC MMODE: 2.73 CM
ECHO LV IVSD MMODE: 1.35 CM
ECHO LV IVSS MMODE: 1.93 CM
ECHO LV POSTERIOR WALL DIASTOLIC MMODE: 1.32 CM
ECHO LV POSTERIOR WALL SYSTOLIC MMODE: 1.65 CM
ECHO LVOT DIAM: 2.05 CM
ECHO MV REGURGITANT PEAK GRADIENT: 99.5 MMHG
ECHO MV REGURGITANT PEAK VELOCITY: 498.64 CM/S
ECHO PULMONARY ARTERY SYSTOLIC PRESSURE (PASP): 41.5 MMHG
ECHO PV MAX VELOCITY: 68.26 CM/S
ECHO PV PEAK GRADIENT: 1.9 MMHG
ECHO RIGHT VENTRICULAR SYSTOLIC PRESSURE (RVSP): 41.5 MMHG
ECHO RV INTERNAL DIMENSION: 3.14 CM
ECHO TV A WAVE: 43.13 CM/S
ECHO TV E WAVE: 59.24 CM/S
ECHO TV EROA: 0.7
ECHO TV REGURGITANT MAX VELOCITY: 280.42 CM/S
ECHO TV REGURGITANT PEAK GRADIENT: 31.5 MMHG
EOSINOPHIL # BLD: 0.1 K/UL (ref 0–0.4)
EOSINOPHIL NFR BLD: 3 % (ref 0–7)
EPITH CASTS URNS QL MICRO: ABNORMAL /LPF
ERYTHROCYTE [DISTWIDTH] IN BLOOD BY AUTOMATED COUNT: 14.5 % (ref 11.5–14.5)
ERYTHROCYTE [SEDIMENTATION RATE] IN BLOOD: 23 MM/HR (ref 0–30)
EST. AVERAGE GLUCOSE BLD GHB EST-MCNC: 120 MG/DL
GLOBULIN SER CALC-MCNC: 4.5 G/DL (ref 2–4)
GLUCOSE BLD STRIP.AUTO-MCNC: 88 MG/DL (ref 65–100)
GLUCOSE SERPL-MCNC: 93 MG/DL (ref 65–100)
GLUCOSE UR STRIP.AUTO-MCNC: NEGATIVE MG/DL
HBA1C MFR BLD: 5.8 % (ref 4–5.6)
HCT VFR BLD AUTO: 39 % (ref 35–47)
HDLC SERPL-MCNC: 57 MG/DL
HDLC SERPL: 2.7 {RATIO} (ref 0–5)
HGB BLD-MCNC: 12.6 G/DL (ref 11.5–16)
HGB UR QL STRIP: NEGATIVE
IMM GRANULOCYTES # BLD AUTO: 0 K/UL (ref 0–0.04)
IMM GRANULOCYTES NFR BLD AUTO: 0 % (ref 0–0.5)
INR PPP: 1 (ref 0.9–1.1)
KETONES UR QL STRIP.AUTO: NEGATIVE MG/DL
LDLC SERPL CALC-MCNC: 83.4 MG/DL (ref 0–100)
LEFT CCA DIST DIAS: 18.3 CM/S
LEFT CCA DIST SYS: 72.3 CM/S
LEFT CCA PROX DIAS: 17.1 CM/S
LEFT CCA PROX SYS: 73.5 CM/S
LEFT ECA DIAS: 6 CM/S
LEFT ECA SYS: 51.4 CM/S
LEFT ICA DIST DIAS: 28.4 CM/S
LEFT ICA DIST SYS: 69.9 CM/S
LEFT ICA MID DIAS: 19.5 CM/S
LEFT ICA MID SYS: 56.4 CM/S
LEFT ICA PROX DIAS: 15.8 CM/S
LEFT ICA PROX SYS: 42.8 CM/S
LEFT ICA/CCA SYS: 1
LEFT SUBCLAVIAN DIAS: 0 CM/S
LEFT SUBCLAVIAN SYS: 69.9 CM/S
LEFT VERTEBRAL DIAS: 14.6 CM/S
LEFT VERTEBRAL SYS: 42.9 CM/S
LEUKOCYTE ESTERASE UR QL STRIP.AUTO: ABNORMAL
LIPID PROFILE,FLP: NORMAL
LVFS: 39.46 %
LYMPHOCYTES # BLD: 1.4 K/UL (ref 0.8–3.5)
LYMPHOCYTES NFR BLD: 40 % (ref 12–49)
MAGNESIUM SERPL-MCNC: 2.1 MG/DL (ref 1.6–2.4)
MCH RBC QN AUTO: 27.3 PG (ref 26–34)
MCHC RBC AUTO-ENTMCNC: 32.3 G/DL (ref 30–36.5)
MCV RBC AUTO: 84.6 FL (ref 80–99)
METHADONE UR QL: NEGATIVE
MONOCYTES # BLD: 0.5 K/UL (ref 0–1)
MONOCYTES NFR BLD: 13 % (ref 5–13)
NEUTS SEG # BLD: 1.5 K/UL (ref 1.8–8)
NEUTS SEG NFR BLD: 43 % (ref 32–75)
NITRITE UR QL STRIP.AUTO: NEGATIVE
NRBC # BLD: 0 K/UL (ref 0–0.01)
NRBC BLD-RTO: 0 PER 100 WBC
OPIATES UR QL: NEGATIVE
P-R INTERVAL, ECG05: 244 MS
PCP UR QL: NEGATIVE
PH UR STRIP: 6 [PH] (ref 5–8)
PLATELET # BLD AUTO: 208 K/UL (ref 150–400)
PMV BLD AUTO: 10.6 FL (ref 8.9–12.9)
POTASSIUM SERPL-SCNC: 3.9 MMOL/L (ref 3.5–5.1)
PROT SERPL-MCNC: 8.5 G/DL (ref 6.4–8.2)
PROT UR STRIP-MCNC: NEGATIVE MG/DL
PROTHROMBIN TIME: 10.8 SEC (ref 9–11.1)
PULMONARY ARTERY END DIASTOLIC PRESSURE: 19.8 MMHG
PULMONARY ARTERY MEAN PRESURE: 27 MMHG
PV END DIASTOLIC VELOCITY: 1.6 MMHG
Q-T INTERVAL, ECG07: 424 MS
QRS DURATION, ECG06: 92 MS
QTC CALCULATION (BEZET), ECG08: 433 MS
RBC # BLD AUTO: 4.61 M/UL (ref 3.8–5.2)
RBC #/AREA URNS HPF: ABNORMAL /HPF (ref 0–5)
RIGHT CCA DIST DIAS: 9.7 CM/S
RIGHT CCA DIST SYS: 44.1 CM/S
RIGHT CCA PROX DIAS: 17.3 CM/S
RIGHT CCA PROX SYS: 70 CM/S
RIGHT ECA DIAS: 9.7 CM/S
RIGHT ECA SYS: 47.8 CM/S
RIGHT ICA DIST DIAS: 16.7 CM/S
RIGHT ICA DIST SYS: 52.1 CM/S
RIGHT ICA MID DIAS: 4.8 CM/S
RIGHT ICA MID SYS: 25.7 CM/S
RIGHT ICA PROX DIAS: 9.7 CM/S
RIGHT ICA PROX SYS: 30.6 CM/S
RIGHT ICA/CCA SYS: 1.2
RIGHT SUBCLAVIAN DIAS: 0 CM/S
RIGHT SUBCLAVIAN SYS: 77.2 CM/S
RIGHT VERTEBRAL DIAS: 7.2 CM/S
RIGHT VERTEBRAL SYS: 29.4 CM/S
SALICYLATES SERPL-MCNC: <1.7 MG/DL (ref 2.8–20)
SAMPLES BEING HELD,HOLD: NORMAL
SERVICE CMNT-IMP: NORMAL
SODIUM SERPL-SCNC: 138 MMOL/L (ref 136–145)
SP GR UR REFRACTOMETRY: 1.02 (ref 1–1.03)
TRIGL SERPL-MCNC: 68 MG/DL (ref ?–150)
UA: UC IF INDICATED,UAUC: ABNORMAL
UROBILINOGEN UR QL STRIP.AUTO: 1 EU/DL (ref 0.2–1)
VENTRICULAR RATE, ECG03: 63 BPM
VLDLC SERPL CALC-MCNC: 13.6 MG/DL
WBC # BLD AUTO: 3.6 K/UL (ref 3.6–11)
WBC URNS QL MICRO: ABNORMAL /HPF (ref 0–4)

## 2020-04-22 PROCEDURE — 93880 EXTRACRANIAL BILAT STUDY: CPT

## 2020-04-22 PROCEDURE — 97161 PT EVAL LOW COMPLEX 20 MIN: CPT

## 2020-04-22 PROCEDURE — 86038 ANTINUCLEAR ANTIBODIES: CPT

## 2020-04-22 PROCEDURE — 82962 GLUCOSE BLOOD TEST: CPT

## 2020-04-22 PROCEDURE — 74011250637 HC RX REV CODE- 250/637: Performed by: EMERGENCY MEDICINE

## 2020-04-22 PROCEDURE — 80307 DRUG TEST PRSMV CHEM ANLYZR: CPT

## 2020-04-22 PROCEDURE — 87077 CULTURE AEROBIC IDENTIFY: CPT

## 2020-04-22 PROCEDURE — 94760 N-INVAS EAR/PLS OXIMETRY 1: CPT

## 2020-04-22 PROCEDURE — 0042T CT CODE NEURO PERF W CBF: CPT

## 2020-04-22 PROCEDURE — 70450 CT HEAD/BRAIN W/O DYE: CPT

## 2020-04-22 PROCEDURE — 74011636320 HC RX REV CODE- 636/320: Performed by: EMERGENCY MEDICINE

## 2020-04-22 PROCEDURE — 97530 THERAPEUTIC ACTIVITIES: CPT

## 2020-04-22 PROCEDURE — 74011250636 HC RX REV CODE- 250/636: Performed by: HOSPITALIST

## 2020-04-22 PROCEDURE — 97116 GAIT TRAINING THERAPY: CPT

## 2020-04-22 PROCEDURE — 83735 ASSAY OF MAGNESIUM: CPT

## 2020-04-22 PROCEDURE — 74011250637 HC RX REV CODE- 250/637: Performed by: HOSPITALIST

## 2020-04-22 PROCEDURE — 85652 RBC SED RATE AUTOMATED: CPT

## 2020-04-22 PROCEDURE — 99218 HC RM OBSERVATION: CPT

## 2020-04-22 PROCEDURE — 83036 HEMOGLOBIN GLYCOSYLATED A1C: CPT

## 2020-04-22 PROCEDURE — 87186 SC STD MICRODIL/AGAR DIL: CPT

## 2020-04-22 PROCEDURE — 80053 COMPREHEN METABOLIC PANEL: CPT

## 2020-04-22 PROCEDURE — 36415 COLL VENOUS BLD VENIPUNCTURE: CPT

## 2020-04-22 PROCEDURE — 85610 PROTHROMBIN TIME: CPT

## 2020-04-22 PROCEDURE — 93306 TTE W/DOPPLER COMPLETE: CPT

## 2020-04-22 PROCEDURE — 85025 COMPLETE CBC W/AUTO DIFF WBC: CPT

## 2020-04-22 PROCEDURE — 81001 URINALYSIS AUTO W/SCOPE: CPT

## 2020-04-22 PROCEDURE — 97165 OT EVAL LOW COMPLEX 30 MIN: CPT | Performed by: OCCUPATIONAL THERAPIST

## 2020-04-22 PROCEDURE — 87086 URINE CULTURE/COLONY COUNT: CPT

## 2020-04-22 PROCEDURE — 93005 ELECTROCARDIOGRAM TRACING: CPT

## 2020-04-22 PROCEDURE — 70496 CT ANGIOGRAPHY HEAD: CPT

## 2020-04-22 PROCEDURE — 77030038269 HC DRN EXT URIN PURWCK BARD -A

## 2020-04-22 PROCEDURE — 97535 SELF CARE MNGMENT TRAINING: CPT | Performed by: OCCUPATIONAL THERAPIST

## 2020-04-22 PROCEDURE — 74011250636 HC RX REV CODE- 250/636: Performed by: EMERGENCY MEDICINE

## 2020-04-22 PROCEDURE — 80061 LIPID PANEL: CPT

## 2020-04-22 RX ORDER — CLONIDINE HYDROCHLORIDE 0.1 MG/1
TABLET ORAL
Status: DISPENSED
Start: 2020-04-22 | End: 2020-04-22

## 2020-04-22 RX ORDER — SOTALOL HYDROCHLORIDE 80 MG/1
80 TABLET ORAL 2 TIMES DAILY
Status: DISCONTINUED | OUTPATIENT
Start: 2020-04-22 | End: 2020-04-23 | Stop reason: HOSPADM

## 2020-04-22 RX ORDER — CLOPIDOGREL BISULFATE 75 MG/1
300 TABLET ORAL
Status: COMPLETED | OUTPATIENT
Start: 2020-04-22 | End: 2020-04-22

## 2020-04-22 RX ORDER — LABETALOL HYDROCHLORIDE 5 MG/ML
10 INJECTION, SOLUTION INTRAVENOUS
Status: ACTIVE | OUTPATIENT
Start: 2020-04-22 | End: 2020-04-22

## 2020-04-22 RX ORDER — ACETAMINOPHEN 325 MG/1
650 TABLET ORAL
Status: DISCONTINUED | OUTPATIENT
Start: 2020-04-22 | End: 2020-04-23 | Stop reason: HOSPADM

## 2020-04-22 RX ORDER — LABETALOL HYDROCHLORIDE 5 MG/ML
INJECTION, SOLUTION INTRAVENOUS
Status: DISCONTINUED
Start: 2020-04-22 | End: 2020-04-22 | Stop reason: WASHOUT

## 2020-04-22 RX ORDER — LABETALOL HYDROCHLORIDE 5 MG/ML
5 INJECTION, SOLUTION INTRAVENOUS
Status: DISCONTINUED | OUTPATIENT
Start: 2020-04-22 | End: 2020-04-23 | Stop reason: HOSPADM

## 2020-04-22 RX ORDER — ACETAMINOPHEN 325 MG/1
650 TABLET ORAL
Status: DISCONTINUED | OUTPATIENT
Start: 2020-04-22 | End: 2020-04-22

## 2020-04-22 RX ORDER — GUAIFENESIN 100 MG/5ML
325 LIQUID (ML) ORAL
Status: COMPLETED | OUTPATIENT
Start: 2020-04-22 | End: 2020-04-22

## 2020-04-22 RX ORDER — POTASSIUM CHLORIDE 750 MG/1
20 TABLET, FILM COATED, EXTENDED RELEASE ORAL
Status: COMPLETED | OUTPATIENT
Start: 2020-04-22 | End: 2020-04-22

## 2020-04-22 RX ORDER — GUAIFENESIN 100 MG/5ML
81 LIQUID (ML) ORAL DAILY
Status: DISCONTINUED | OUTPATIENT
Start: 2020-04-22 | End: 2020-04-22

## 2020-04-22 RX ORDER — ONDANSETRON 2 MG/ML
4 INJECTION INTRAMUSCULAR; INTRAVENOUS
Status: DISCONTINUED | OUTPATIENT
Start: 2020-04-22 | End: 2020-04-23 | Stop reason: HOSPADM

## 2020-04-22 RX ORDER — LABETALOL HYDROCHLORIDE 5 MG/ML
INJECTION, SOLUTION INTRAVENOUS
Status: DISPENSED
Start: 2020-04-22 | End: 2020-04-22

## 2020-04-22 RX ORDER — SODIUM CHLORIDE 0.9 % (FLUSH) 0.9 %
10 SYRINGE (ML) INJECTION
Status: COMPLETED | OUTPATIENT
Start: 2020-04-22 | End: 2020-04-22

## 2020-04-22 RX ORDER — ATORVASTATIN CALCIUM 20 MG/1
20 TABLET, FILM COATED ORAL DAILY
Status: DISCONTINUED | OUTPATIENT
Start: 2020-04-22 | End: 2020-04-22

## 2020-04-22 RX ORDER — ACETAMINOPHEN 650 MG/1
650 SUPPOSITORY RECTAL
Status: DISCONTINUED | OUTPATIENT
Start: 2020-04-22 | End: 2020-04-23 | Stop reason: HOSPADM

## 2020-04-22 RX ORDER — ASPIRIN 325 MG
325 TABLET ORAL DAILY
Status: DISCONTINUED | OUTPATIENT
Start: 2020-04-22 | End: 2020-04-23

## 2020-04-22 RX ORDER — ATORVASTATIN CALCIUM 40 MG/1
40 TABLET, FILM COATED ORAL DAILY
Status: DISCONTINUED | OUTPATIENT
Start: 2020-04-23 | End: 2020-04-23 | Stop reason: HOSPADM

## 2020-04-22 RX ORDER — HEPARIN SODIUM 5000 [USP'U]/ML
5000 INJECTION, SOLUTION INTRAVENOUS; SUBCUTANEOUS EVERY 8 HOURS
Status: DISCONTINUED | OUTPATIENT
Start: 2020-04-22 | End: 2020-04-23

## 2020-04-22 RX ORDER — ONDANSETRON 2 MG/ML
4 INJECTION INTRAMUSCULAR; INTRAVENOUS
Status: DISCONTINUED | OUTPATIENT
Start: 2020-04-22 | End: 2020-04-22

## 2020-04-22 RX ADMIN — SOTALOL HYDROCHLORIDE 80 MG: 80 TABLET ORAL at 09:08

## 2020-04-22 RX ADMIN — HEPARIN SODIUM 5000 UNITS: 5000 INJECTION INTRAVENOUS; SUBCUTANEOUS at 23:43

## 2020-04-22 RX ADMIN — SOTALOL HYDROCHLORIDE 80 MG: 80 TABLET ORAL at 18:35

## 2020-04-22 RX ADMIN — HEPARIN SODIUM 5000 UNITS: 5000 INJECTION INTRAVENOUS; SUBCUTANEOUS at 09:09

## 2020-04-22 RX ADMIN — ATORVASTATIN CALCIUM 20 MG: 20 TABLET, FILM COATED ORAL at 09:08

## 2020-04-22 RX ADMIN — Medication 10 ML: at 00:43

## 2020-04-22 RX ADMIN — IOPAMIDOL 10 ML: 755 INJECTION, SOLUTION INTRAVENOUS at 00:43

## 2020-04-22 RX ADMIN — ASPIRIN 81 MG 243 MG: 81 TABLET ORAL at 01:04

## 2020-04-22 RX ADMIN — ASPIRIN 325 MG: 325 TABLET, FILM COATED ORAL at 09:08

## 2020-04-22 RX ADMIN — CLOPIDOGREL BISULFATE 300 MG: 75 TABLET ORAL at 01:28

## 2020-04-22 RX ADMIN — IOPAMIDOL 100 ML: 755 INJECTION, SOLUTION INTRAVENOUS at 00:43

## 2020-04-22 RX ADMIN — HEPARIN SODIUM 5000 UNITS: 5000 INJECTION INTRAVENOUS; SUBCUTANEOUS at 16:04

## 2020-04-22 RX ADMIN — CLONIDINE HYDROCHLORIDE 0.2 MG: 0.1 TABLET ORAL at 01:17

## 2020-04-22 RX ADMIN — POTASSIUM CHLORIDE 20 MEQ: 750 TABLET, FILM COATED, EXTENDED RELEASE ORAL at 03:53

## 2020-04-22 RX ADMIN — SODIUM CHLORIDE 1000 ML: 900 INJECTION, SOLUTION INTRAVENOUS at 00:55

## 2020-04-22 NOTE — PROGRESS NOTES
Physical Therapy    Chart reviewed and consulted with RN, patient off the floor for testing.   Will defer and continue to follow for PT eval.    Konstantin Zeng

## 2020-04-22 NOTE — ROUTINE PROCESS
* No surgery found * 
* No surgery found * Bedside shift change report given to Bryan Gandara RN (oncoming nurse) by Morenita Ayala RN (offgoing nurse). Report included the following information [facility] Phone:   [phone] Significant changes during shift:  Patient had ECHO, duplex, and labs drawn today. Cardiology consult completed. MRI cancelled. PT/OT saw patient today and cleared to be up ad marion. Patient Information Kristi Lindo 75 y.o. 
4/21/2020 11:50 PM by Earl Coreas MD. Kristi Lindo was admitted from Home 
 
Problem List 
 
Patient Active Problem List  
 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 05/23/2016  APC (atrial premature contractions) 05/23/2016  First degree AV block 05/23/2016  SSS (sick sinus syndrome) ([address] 05/23/2016  Advanced care planning/counseling discussion 03/22/2016  Dyslipidemia 06/12/2014  Bradycardia, sinus 05/10/2013  Urge incontinence  S/P CABG x 4 09/20/2012  Mixed hyperlipidemia 03/20/2012  Chronic ischemic heart disease 03/20/2012  Postsurgical aortocoronary bypass status 03/20/2012  Coronary atherosclerosis of native coronary artery 03/20/2012  Encounter for long-term (current) use of medications 07/28/2010  Cervical disc herniation  Hypertension  CAD (coronary artery disease)  Paroxysmal supraventricular tachycardia ([address] Past Medical History:  
Diagnosis Date  Acquired cyst of kidney  CAD (coronary artery disease)  Cervical disc herniation  Essential hypertension  Hypercholesterolemia  Hypertension  PAF (paroxysmal atrial fibrillation) ([facility] [address] 6/9/2016  Paroxysmal supraventricular tachycardia ([facility] [address]  Postsurgical aortocoronary bypass status 3/20/2012  S/P cardiac pacemaker procedure 6/9/2016 6/9/16 [employer] dual chamber pacemaker implant  Urge incontinence Core Measures: CVA: Yes Yes CHF:No No 
PNA:No No 
 
 
Activity Status: OOB to Chair Yes Ambulated this shift Yes Bed Rest No 
 
Supplemental O2: (If Applicable) NC No 
NRB No 
Venti-mask No 
 
LINES AND DRAINS: 
 
PIV x2 DVT prophylaxis: DVT prophylaxis Med- Yes DVT prophylaxis SCD or GENEVA- No  
 
Wounds: (If Applicable) Wounds- No 
 
 
 
Patient Safety: 
 
Falls Score Total Score: 3 Safety Level_______ Bed Alarm On? No 
Sitter? No 
 
Plan for upcoming shift:  NPO after MN. CAROLIN and repeat CT in the morning. Discharge Plan: Yes  Home when stable Active Consults: 
IP CONSULT TO HOSPITALIST 
IP CONSULT TO NEUROLOGY 
IP CONSULT TO CARDIOLOGY

## 2020-04-22 NOTE — ED PROVIDER NOTES
EMERGENCY DEP  ARTMENT HISTORY AND PHYSICAL EXAM     ----------------------------------------------------------------------------  Please note that this dictation was completed with [employer], the computer voice recognition software. Quite often unanticipated grammatical, syntax, homophones, and other interpretive errors are inadvertently transcribed by the computer software. Please disregard these errors. Please excuse any errors that have escaped final proofreading  ----------------------------------------------------------------------------      Date: 4/21/2020  Patient Name: Milo Bassett    History of Presenting Illness     Chief Complaint   Patient presents with    Numbness     pt arrives ambulatory to the ED with c/o right hand numbness and difficulty squeezing and making a fist that started about 10:30 pm. pt denies any other symptoms at this time       History Provided By:  Patient    HPI: Milo Bassett is a 75 y.o. female, with significant pmhx of HTN, who presents via private vehicle / EMS to the ED with c/o right hand weakness/tingling that started around 10:30 PM.  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 Upon returning home half hour later she was having difficulty removing her seatbelt and had to use her left hand instead. Patient notes that the tingling/numbness of her right hand has subsequently resolved. During my interview with the patient she she notably had word finding difficulty could not say the word \"seatbelt\"    Patient was alerted as a level 1 code asked at this time. 9153      Patient also specifically denies any associated fevers, chills, CP, SOB, nausea, vomiting, diarrhea, abd pain, changes in BM, urinary sxs, or headache. Social Hx: denies tobacco  denies EtOH , denies Illicit Drugs    There are no other complaints, changes, or physical findings at this time.      PCP: Amy Sidhu MD    Allergies Allergen Reactions    Ace Inhibitors Cough    Pcn [Penicillins] Anaphylaxis and Hives     Just got Rocephin in ED, no reactions    Niaspan [Niacin] Unable to Obtain       Current Facility-Administered Medications   Medication Dose Route Frequency Provider Last Rate Last Dose    labetaloL (NORMODYNE;TRANDATE) injection 10 mg  10 mg IntraVENous NOW Yvonne Arriola MD        labetaloL (NORMODYNE;TRANDATE) 5 mg/mL injection             alteplase (ACTIVASE) 100 mg infusion             labetaloL (NORMODYNE;TRANDATE) 5 mg/mL injection             clopidogreL (PLAVIX) tablet 300 mg  300 mg Oral NOW Yvonne Arriola MD        acetaminophen (TYLENOL) tablet 650 mg  650 mg Oral Q6H PRN Yvonne Arriola MD        ondansetron [facility]) injection 4 mg  4 mg IntraVENous Q4H PRN Yvonne Arriola MD         Current Outpatient Medications   Medication Sig Dispense Refill    losartan-hydroCHLOROthiazide (HYZAAR) 50-12.5 mg per tablet Take 2 Tabs by mouth daily. 60 Tab 5    sotalol (BETAPACE) 80 mg tablet Take 1 Tab by mouth two (2) times a day. 60 Tab 11    simvastatin (ZOCOR) 40 mg tablet Take 1 Tab by mouth nightly. 90 Tab 3    PSYLLIUM HUSK (DAILY FIBER PO) Take  by mouth daily.  FOLIC ACID/MULTIVIT-MIN/LUTEIN (CENTRUM SILVER PO) Take  by mouth daily.  aspirin (ASPIRIN) 325 mg tablet Take 325 mg by mouth daily.  omega-3 fatty acids-vitamin e (FISH OIL) 1,000 mg Cap Take  by mouth daily.          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    S/P cardiac pacemaker procedure 6/9/2016 6/9/16 [employer] dual chamber pacemaker implant    Russele incontinence        Past Surgical History:  Past Surgical History:   Procedure Laterality Date    CABG, ARTERY-VEIN, FOUR      COLONOSCOPY N/A 2018    COLONOSCOPY performed by Rafiq Gould MD at \[facility]\"" ENDOSCOPY    ENDOSCOPY, COLON, DIAGNOSTIC      due  hx proctitis    HX CERVICAL DISKECTOMY      HX CORONARY ARTERY BYPASS GRAFT      HX HEENT      Thyroidectomy    HX ORTHOPAEDIC      benign thumb tumor    HX PACEMAKER Left 2016    HX UROLOGICAL  2011    stent for hydronephrosis       Family History:  Family History   Problem Relation Age of Onset    Asthma Mother     Hypertension Father     Stroke Father     Cancer Sister         breast    Diabetes Brother     Heart Disease Sister     Heart Disease Brother        Social History:  Social History     Tobacco Use    Smoking status: Former Smoker     Packs/day: 0.50     Years: 10.00     Pack years: 5.00     Types: Cigarettes     Last attempt to quit: 2007     Years since quittin.8    Smokeless tobacco: Never Used   Substance Use Topics    Alcohol use: No     Alcohol/week: 0.0 standard drinks    Drug use: No       Allergies: Allergies   Allergen Reactions    Ace Inhibitors Cough    Pcn [Penicillins] Anaphylaxis and Hives     Just got Rocephin in ED, no reactions    Niaspan [Niacin] Unable to Obtain         Review of Systems   Review of Systems   Constitutional: Negative. Negative for fever. Eyes: Negative. Respiratory: Negative. Negative for shortness of breath. Cardiovascular: Negative for chest pain. Gastrointestinal: Negative for abdominal pain, nausea and vomiting. Endocrine: Negative. Genitourinary: Negative. Negative for difficulty urinating, dysuria and hematuria. Musculoskeletal: Negative. Skin: Negative. Neurological: Positive for numbness. Psychiatric/Behavioral: Positive for confusion. Negative for suicidal ideas. All other systems reviewed and are negative.         Physical Exam Physical Exam  Vitals signs and nursing note reviewed. Constitutional:       General: She is not in acute distress. Appearance: She is well-developed. She is obese. She is not diaphoretic. HENT:      Head: Normocephalic and atraumatic. Nose: Nose normal.   Eyes:      General: No scleral icterus. Conjunctiva/sclera: Conjunctivae normal.   Neck:      Musculoskeletal: Normal range of motion. Trachea: No tracheal deviation. Cardiovascular:      Rate and Rhythm: Normal rate and regular rhythm. Heart sounds: Normal heart sounds. No murmur. No friction rub. Pulmonary:      Effort: Pulmonary effort is normal. No respiratory distress. Breath sounds: Normal breath sounds. No stridor. No wheezing or rales. Abdominal:      General: Bowel sounds are normal. There is no distension. Palpations: Abdomen is soft. Tenderness: There is no abdominal tenderness. There is no rebound. Musculoskeletal: Normal range of motion. General: No tenderness. Skin:     General: Skin is warm and dry. Capillary Refill: Capillary refill takes less than 2 seconds. Findings: No rash. Neurological:      General: No focal deficit present. Mental Status: She is alert. GCS: GCS eye subscore is 4. GCS verbal subscore is 4. GCS motor subscore is 6. Cranial Nerves: No cranial nerve deficit. Sensory: Sensation is intact. Motor: Motor function is intact. Coordination: Coordination is intact. Gait: Gait is intact. Psychiatric:         Speech: She is noncommunicative (word finding difficulty). Behavior: Behavior normal.         Thought Content:  Thought content normal.         Judgment: Judgment normal.           Diagnostic Study Results     Labs -     Recent Results (from the past 12 hour(s))   GLUCOSE, POC    Collection Time: 04/22/20 12:01 AM   Result Value Ref Range    Glucose (POC) 88 65 - 100 mg/dL    Performed by Laura Kim    [facility] WITH AUTOMATED DIFF    Collection Time: 04/22/20 12:10 AM   Result Value Ref Range    WBC 3.6 3.6 - 11.0 K/uL    RBC 4.61 3.80 - 5.20 M/uL    HGB 12.6 11.5 - 16.0 g/dL    HCT 39.0 35.0 - 47.0 %    MCV 84.6 80.0 - 99.0 FL    MCH 27.3 26.0 - 34.0 PG    MCHC 32.3 30.0 - 36.5 g/dL    RDW 14.5 11.5 - 14.5 %    PLATELET 752 365 - 207 K/uL    MPV 10.6 8.9 - 12.9 FL    NRBC 0.0 0  WBC    ABSOLUTE NRBC 0.00 0.00 - 0.01 K/uL    NEUTROPHILS 43 32 - 75 %    LYMPHOCYTES 40 12 - 49 %    MONOCYTES 13 5 - 13 %    EOSINOPHILS 3 0 - 7 %    BASOPHILS 1 0 - 1 %    IMMATURE GRANULOCYTES 0 0.0 - 0.5 %    ABS. NEUTROPHILS 1.5 (L) 1.8 - 8.0 K/UL    ABS. LYMPHOCYTES 1.4 0.8 - 3.5 K/UL    ABS. MONOCYTES 0.5 0.0 - 1.0 K/UL    ABS. EOSINOPHILS 0.1 0.0 - 0.4 K/UL    ABS. BASOPHILS 0.0 0.0 - 0.1 K/UL    ABS. IMM. GRANS. 0.0 0.00 - 0.04 K/UL    DF AUTOMATED     METABOLIC PANEL, COMPREHENSIVE    Collection Time: 04/22/20 12:10 AM   Result Value Ref Range    Sodium 138 136 - 145 mmol/L    Potassium 3.9 3.5 - 5.1 mmol/L    Chloride 104 97 - 108 mmol/L    CO2 28 21 - 32 mmol/L    Anion gap 6 5 - 15 mmol/L    Glucose 93 65 - 100 mg/dL    BUN 27 (H) 6 - 20 MG/DL    Creatinine 1.19 (H) 0.55 - 1.02 MG/DL    BUN/Creatinine ratio 23 (H) 12 - 20      GFR est AA 54 (L) >60 ml/min/1.73m2    GFR est non-AA 44 (L) >60 ml/min/1.73m2    Calcium 9.0 8.5 - 10.1 MG/DL    Bilirubin, total 0.5 0.2 - 1.0 MG/DL    ALT (SGPT) 26 12 - 78 U/L    AST (SGOT) 27 15 - 37 U/L    Alk.  phosphatase 100 45 - 117 U/L    Protein, total 8.5 (H) 6.4 - 8.2 g/dL    Albumin 4.0 3.5 - 5.0 g/dL    Globulin 4.5 (H) 2.0 - 4.0 g/dL    A-G Ratio 0.9 (L) 1.1 - 2.2     SALICYLATE    Collection Time: 04/22/20 12:10 AM   Result Value Ref Range    Salicylate level <5.3 (L) 2.8 - 20.0 MG/DL   ACETAMINOPHEN    Collection Time: 04/22/20 12:10 AM   Result Value Ref Range    Acetaminophen level <2 (L) 10 - 30 ug/mL   SAMPLES BEING HELD    Collection Time: 04/22/20 12:10 AM   Result Value Ref Range SAMPLES BEING HELD BL     COMMENT        Add-on orders for these samples will be processed based on acceptable specimen integrity and analyte stability, which may vary by analyte. PROTHROMBIN TIME + INR    Collection Time: 04/22/20 12:10 AM   Result Value Ref Range    INR 1.0 0.9 - 1.1      Prothrombin time 10.8 9.0 - 11.1 sec   EKG, 12 LEAD, INITIAL    Collection Time: 04/22/20 12:57 AM   Result Value Ref Range    Ventricular Rate 63 BPM    Atrial Rate 63 BPM    P-R Interval 244 ms    QRS Duration 92 ms    Q-T Interval 424 ms    QTC Calculation (Bezet) 433 ms    Calculated P Axis 47 degrees    Calculated R Axis 22 degrees    Calculated T Axis 31 degrees    Diagnosis       Sinus rhythm with 1st degree AV block  Possible Anterior infarct , age undetermined  When compared with ECG of 10-ABDELRAHMAN-2016 04:59,  premature atrial complexes are no longer present  Nonspecific T wave abnormality no longer evident in Anterolateral leads         Radiologic Studies -   CTA CODE NEURO HEAD AND NECK W CONT   Final Result   impression: No acute abnormalities. CT CODE NEURO PERF W CBF   Final Result   IMPRESSION: No significant abnormalities demonstrated. CT CODE NEURO HEAD WO CONTRAST   Final Result   IMPRESSION: No acute changes. CT Results  (Last 48 hours)               04/22/20 0042  CTA CODE NEURO HEAD AND NECK W CONT Final result    Impression:  impression: No acute abnormalities. Narrative:  Clinical indication: Right arm numbness, acute. CT of the head and neck obtained after intravenous injection of 100 cc of   Isovue-370 with review of the raw data and MIP reconstructions are. No prior. CT   dose reduction was achieved through the use of a standardized protocol tailored   for this examination and automatic exposure control for dose modulation . Brandi Garcia NASCET criteria. Incidental enlarged left thyroid lobe. Incidental diffuse atherosclerosis of the thoracic arch.     Origins of the great vessels show no significant stenosis. The carotid bifurcation show no significant abnormalities. The left vertebral is a dominant vessel. Intracranially there is no filling defect, significant stenosis or vascular   malformation. 04/22/20 0042  CT CODE NEURO PERF W CBF Final result    Impression:  IMPRESSION: No significant abnormalities demonstrated. Narrative:  Clinical indication: Right arm weakness. CT brain perfusion was performed with generation of hemodynamic maps of multiple   parameters, including cerebral blood flow, cerebral blood volume, and MTT (mean   transit time). CT dose reduction was achieved through use of a standardized   protocol tailored for this examination and automatic exposure control for dose   modulation. No significant abnormalities demonstrated. 04/22/20 0014  CT CODE NEURO HEAD WO CONTRAST Final result    Impression:  IMPRESSION: No acute changes. Narrative:  EXAM: CT CODE NEURO HEAD WO CONTRAST       INDICATION: hand weakness/tingling right side       COMPARISON: None. CONTRAST: None. TECHNIQUE: Unenhanced CT of the head was performed using 5 mm images. Brain and   bone windows were generated. CT dose reduction was achieved through use of a   standardized protocol tailored for this examination and automatic exposure   control for dose modulation. FINDINGS:   There is no extra-axial fluid collection hemorrhage or shift. No mass. Minimal   white matter disease. CXR Results  (Last 48 hours)    None            Medical Decision Making   I am the first provider for this patient. I reviewed the vital signs, available nursing notes, past medical history, past surgical history, family history and social history. Vital Signs-Reviewed the patient's vital signs.   Patient Vitals for the past 12 hrs:   Temp Pulse Resp BP SpO2   04/22/20 0055  77 16 (!) 143/94 94 %   04/22/20 0030  75 17 157/86  04/22/20 0015  65 17 (!) 179/96    04/21/20 2346 98.1 °F (36.7 °C) (!) 58 16 (!) 185/127 100 %       Pulse Oximetry Analysis - 100% on RA    Cardiac Monitor:   Rate: 58 bpm  Rhythm: sinus james      Provider Notes (Medical Decision Making):     DDX:  Stroke, intracranial bleed, large vessel occlusion, hypertensive urgency/emergency    Plan:  Code aspirin to cough, tele-neuro consult    Impression:  TIA, hypertensive urgency    ED Course:   Initial assessment performed. The patients presenting problems have been discussed, and they are in agreement with the care plan formulated and outlined with them. I have encouraged them to ask questions as they arise throughout their visit. I reviewed our electronic medical record system for any past medical records that were available that may contribute to the patients current condition, the nursing notes and and vital signs from today's visit    Nursing notes will be reviewed as they become available in realtime while the pt has been in the ED. Faina Quispe MD    HYPERTENSION COUNSELING:  Patient made aware of their elevated blood pressure and is instructed to follow up this week with their Primary Care or [address] [id number] for a recheck (should they be discharged.) Patient is counseled regarding consequences of chronic, uncontrolled hypertension including kidney disease, heart disease, stroke or even death. Patient states their understanding    EKG interpretation 0057: NSR, nl Axis, rate 63; , QRS 92, QTc 433; no acute ischemia; interpreted by Faina Quispe MD    I personally reviewed/interpreted pt's imaging. Agree with official read by radiology as noted above. Faina Quispe MD    CONSULT NOTE:    Dariela Hurtado MD spoke with Dr. Meghann Astudillo,   Specialty: [facility] Astudillo due to word finding difficulty, right hand deficit. Discussed pt's HPI and available diagnostic results thus far.  Expressed concerns for potential stroke. Consultant will evaluate once plain head CT is completed. Kelly Quiles MD      PROGRESS NOTE:  0011  Pt evaluated by telemetry neuro who recommends CTA and full dose aspirin. Discussed TPA with patient and notes with resolving symptoms she is not a candidate. Kelly Quiles MD    PROGRESS NOTE  1:18 AM  Patient noted to be having improvement in blood pressure without initial treatment ordered (labetalol.)  Patient notes that she is feeling improved with decreased tingling in her right hand. Dr. Abhishek Herrera of neurology reevaluated patient by telemetry neuro monitor recommends providing Plavix at this time as well. Will discuss with hospitalist for admission negative CTA. CONSULT NOTE:   1:19 AM  Kelly Quiles MD spoke with Dr. Jenny Stubbs,   Specialty: [facility] Dr. Jenny Stubbs due to [place]. Discussed pt's HPI and available diagnostic results thus far. Expressed concerns for needed admission. Consultant will evaluate for admission. Kelly Quiles MD           Critical Care Time:     CRITICAL CARE NOTE  IMPENDING DETERIORATION -Airway, Respiratory, Cardiovascular, CNS and Metabolic  ASSOCIATED RISK FACTORS - Dysrhythmia, Metabolic changes, Vascular Compromise and CNS Decompensation  MANAGEMENT- Bedside Assessment and Supervision of Care  INTERPRETATION -  Xrays, CT Scan, ECG and Blood Pressure  INTERVENTIONS - hemodynamic mngmt, vascular control, Neurologic interventions  and Metobolic interventions  CASE REVIEW - Hospitalist, Medical Sub-Specialist and Nursing  TREATMENT RESPONSE -Improved  PERFORMED BY - Self  NOTES   :  I have spent 90 minutes of critical care time involved in lab review, consultations with specialist, family decision- making, bedside attention and documentation excluding time spent on any separately billed procedures. During this entire length of time I was immediately available to the patient . Kelly Quiles MD        Diagnosis     Clinical Impression:   1. Word finding difficulty    2. Numbness and tingling in right hand        PLAN:  1. Admit to hospitalist            This note will not be viewable in [address]

## 2020-04-22 NOTE — PROGRESS NOTES

## 2020-04-22 NOTE — CONSULTS
[address] [facility]     Date of  Admission: 4/21/2020 11:50 PM     Admission type:Emergency    Consult for: TIA, a fib hx  Consult by: neuro      Subjective:     Jessie Murphy is a 75 y.o. female with PMH SSS s/p pacemaker, CABG, PAF, HLD, HTN, PSVT who was admitted for TIA (transient ischemic attack) [G45.9]. .    Per ED provider note Jessie Murphy presented to the ED with c/o R hand numbness and weakness. Cardiology consulted for TIA with history of PAF. On assessment, Carnetti E Banks is sitting in bed and states resolution of symptoms. No further weakness, numbness, or speech difficulty. She denies pain, SOB, palpitations. She also denies any history of bleeding concerns. Carnetti E Banks  follows with Dr. Janell Friedman and Dr. Sarai Cody for cardiology. Last ECHO today with EF 55-60%; mod dilated LA; mild MR; mild pHTN.          Cardiac risk factors: smoking/ tobacco exposure, dyslipidemia, obesity, hypertension, post-menopausal.      Patient Active Problem List    Diagnosis Date Noted    TIA (transient ischemic attack) 04/22/2020    Bilateral carotid artery stenosis 04/22/2020    Transient ischemic attack involving left internal carotid artery 04/22/2020    Pacemaker 10/04/2018    Severe obesity (BMI 35.0-39.9) 06/20/2018    Prediabetes 09/21/2017    S/P cardiac pacemaker procedure 06/09/2016    PAF (paroxysmal atrial fibrillation) ([facility] [address] 06/09/2016    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 05/23/2016    APC (atrial premature contractions) 05/23/2016    First degree AV block 05/23/2016    SSS (sick sinus syndrome) ([facility] [address] 05/23/2016    Advanced care planning/counseling discussion 03/22/2016    Dyslipidemia 06/12/2014    Bradycardia, sinus 05/10/2013    Urge incontinence     S/P CABG x 4 09/20/2012    Mixed hyperlipidemia 03/20/2012    Chronic ischemic heart disease 03/20/2012    Postsurgical aortocoronary bypass status 03/20/2012    Coronary atherosclerosis of native coronary artery 2012    Encounter for long-term (current) use of medications 2010    Cervical disc herniation     Hypertension     CAD (coronary artery disease)     Paroxysmal supraventricular tachycardia (HCC)       Sofia Houston MD  Past Medical History:   Diagnosis Date    Acquired cyst of kidney     CAD (coronary artery disease)     Cervical disc herniation     Essential hypertension     Hypercholesterolemia     Hypertension     PAF (paroxysmal atrial fibrillation) ([facility] [address] 2016    Paroxysmal supraventricular tachycardia ([facility] [address]     Postsurgical aortocoronary bypass status 3/20/2012    S/P cardiac pacemaker procedure 2016 [employer] dual chamber pacemaker implant    Urge incontinence       Social History     Socioeconomic History    Marital status:      Spouse name: Not on file    Number of children: Not on file    Years of education: Not on file    Highest education level: Not on file   Tobacco Use    Smoking status: Former Smoker     Packs/day: 0.50     Years: 10.00     Pack years: 5.00     Types: Cigarettes     Last attempt to quit: 2007     Years since quittin.8    Smokeless tobacco: Never Used   Substance and Sexual Activity    Alcohol use: No     Alcohol/week: 0.0 standard drinks    Drug use: No    Sexual activity: Never     Allergies   Allergen Reactions    Ace Inhibitors Cough    Pcn [Penicillins] Anaphylaxis and Hives     Just got Rocephin in ED, no reactions    Niaspan [Niacin] Unable to Obtain      Family History   Problem Relation Age of Onset    Asthma Mother     Hypertension Father     Stroke Father     Alcohol abuse Father     Cancer Sister         breast    Diabetes Brother     Heart Disease Sister     Heart Disease Brother       Current Facility-Administered Medications   Medication Dose Route Frequency    labetaloL (NORMODYNE;TRANDATE) injection 10 mg  10 mg IntraVENous NOW    labetaloL (NORMODYNE;TRANDATE) 5 mg/mL injection        alteplase (ACTIVASE) 100 mg infusion        acetaminophen (TYLENOL) tablet 650 mg  650 mg Oral Q4H PRN    Or    acetaminophen (TYLENOL) solution 650 mg  650 mg Per NG tube Q4H PRN    Or    acetaminophen (TYLENOL) suppository 650 mg  650 mg Rectal Q4H PRN    ondansetron (ZOFRAN) injection 4 mg  4 mg IntraVENous Q6H PRN    labetaloL (NORMODYNE;TRANDATE) injection 5 mg  5 mg IntraVENous Q10MIN PRN    heparin (porcine) injection 5,000 Units  5,000 Units SubCUTAneous Q8H    sotaloL (BETAPACE) tablet 80 mg  80 mg Oral BID    aspirin tablet 325 mg  325 mg Oral DAILY    [START ON 4/23/2020] atorvastatin (LIPITOR) tablet 40 mg  40 mg Oral DAILY        Review of Symptoms:   11 systems reviewed, negative other than as stated in the HPI        Objective:      Visit Vitals  /80   Pulse 61   Temp 97.7 °F (36.5 °C)   Resp 18   Ht 5' 2\" (1.575 m)   Wt 88.5 kg (195 lb 1.7 oz)   SpO2 97%   BMI 35.69 kg/m²       Physical:   General: pleasant, elderly, AAF sitting in bed in NAD  Heart: RRR, no m/S3/JVD  Lungs: clear   Abdomen: Soft, +BS, NTND   Extremities: LE stan +DP/PT, no edema   Neurologic: Grossly normal    Data Review:   Recent Labs     04/22/20  0010   WBC 3.6   HGB 12.6   HCT 39.0        Recent Labs     04/22/20  0010      K 3.9      CO2 28   GLU 93   BUN 27*   CREA 1.19*   CA 9.0   MG 2.1   ALB 4.0   TBILI 0.5   SGOT 27   ALT 26   INR 1.0       No results for input(s): TROIQ, CPK, CKMB in the last 72 hours. Intake/Output Summary (Last 24 hours) at 4/22/2020 1149  Last data filed at 4/22/2020 0239  Gross per 24 hour   Intake 2000 ml   Output 400 ml   Net 1600 ml        Cardiographics    Telemetry: some AV pacing with underlying SR 1st degree   ECG: SR with 1st degree AVB  Echocardiogram: EF 55-60%; mod dilated LA; mild MR; mild pHTN.     CXRAY:n/a       Assessment:       Active Problems:    Paroxysmal supraventricular tachycardia (HCC) ()      Chronic ischemic heart disease (3/20/2012)      TIA (transient ischemic attack) (4/22/2020)      Bilateral carotid artery stenosis (4/22/2020)      Transient ischemic attack involving left internal carotid artery (4/22/2020)         Plan:     Adonna Bamberger is a 75 y.o. female who presented to the ED with stroke like symptoms and was found to have a TIA. Unable to have MRI due to her pacemaker. Cardiology consulted due to TIA with PAF history. CT/CTA negative. Labs ok. · Have pacemaker interrogated. Have already contacted [employer]. Last check in February without events. · If a fib noted, will need AC. Patient states she was never on [facility] in the past for her PAF history, but denies bleeding concerns. · If no a fib noted, consider CAROLIN tomorrow to check for thrombus  · TTE without new concerns. EF 55-60%   · Is on ASA, statin, BB for CAD history. Thank you for consulting [facility]    Sandra Nance NP  DNP, RN, [facility]    4/22/2020         Patient seen, examined by me personally. Plan discussed as detailed. Agree with note as outlined by  NP. I confirm findings in history and physical exam. No additional findings noted. Agree with plan as outlined above. Discussed with Dr. Torrey Clemente.     Elzbieta Kessler MD

## 2020-04-22 NOTE — PROGRESS NOTES
Occupational Therapy  Orders received and medical record reviewed. Pt participated in OT Evaluation and does not need acute OT services, nor does she need OT at discharge. Full OT note to follow.

## 2020-04-22 NOTE — PROGRESS NOTES
Patient was admitted earlier for Rt hand weakness which has since resolved. CT/CTA head and neck unremarkbale  MRI cannot be done as she has pacemaker which is incompatible.   Continue to monitor  Complete neurovascular workup ordered   Pacemaker interrogation

## 2020-04-22 NOTE — CONSULTS
Consult  REFERRED BY:  Wayne Sahu MD    CHIEF COMPLAINT: Right hand weakness and numbness      Subjective:     Michelet Lott is a 75 y.o. right-handed -American female we are seen today as a new patient for evaluation of sudden episode of right hand numbness and weakness that occurred for 2 hours yesterday when she was trying to buckle her seatbelt could not feel or use her right hand correctly and had to use her left hand and before self, but denied any other focal neurological symptoms. She denies any chest pain palpitations or cardiac symptoms, denied any facial involvement denied any leg involvement denied any headache or vision changes or speech trouble. Her CTA of the head and neck was normal and her CT of the head was normal except for minimal white matter disease. Patient was on 325 mg of aspirin prior to admission and now is on the same medication plus Lipitor. She has a history of paroxysmal atrial fibrillation. She has an MRI incompatible pacemaker. We will repeat her CT scan and ask cardiology to see her to see whether or not she is a candidate for anticoagulation or interrogate the pacemaker for any A. fib. Patient probably back to normal now.     Past Medical History:   Diagnosis Date    Acquired cyst of kidney     CAD (coronary artery disease)     Cervical disc herniation     Essential hypertension     Hypercholesterolemia     Hypertension     PA[facility]oxysmal [facility] 6/9/2016    Paroxysmal supraventricular tachycardia ([address]     Postsurgical aortocoronary bypass status 3/20/2012    S/P cardiac pacemaker procedure 6/9/2016 6/9/16 [employer] dual chamber pacemaker implant    Urge incontinence       Past Surgical History:   Procedure Laterality Date    CABG, ARTERY-VEIN, FOUR  2007    COLONOSCOPY N/A 1/5/2018    COLONOSCOPY performed by Kaz Herzog MD at hospitals ENDOSCOPY    ENDOSCOPY, COLON, DIAGNOSTIC      due 2012 hx proctitis    HX CERVICAL DISKECTOMY      HX CORONARY ARTERY BYPASS GRAFT      HX HEENT      Thyroidectomy    HX ORTHOPAEDIC      benign thumb tumor    HX PACEMAKER Left 2016    HX UROLOGICAL  2011    stent for hydronephrosis     Family History   Problem Relation Age of Onset    Asthma Mother     Hypertension Father     Stroke Father     Alcohol abuse Father     Cancer Sister         breast    Diabetes Brother     Heart Disease Sister     Heart Disease Brother       Social History     Tobacco Use    Smoking status: Former Smoker     Packs/day: 0.50     Years: 10.00     Pack years: 5.00     Types: Cigarettes     Last attempt to quit: 2007     Years since quittin.8    Smokeless tobacco: Never Used   Substance Use Topics    Alcohol use: No     Alcohol/week: 0.0 standard drinks         Current Facility-Administered Medications:     labetaloL (NORMODYNE;TRANDATE) injection 10 mg, 10 mg, IntraVENous, NOW, Sybil Wallis MD, Stopped at 20 0009    labetaloL (NORMODYNE;TRANDATE) 5 mg/mL injection, , , ,     alteplase (ACTIVASE) 100 mg infusion, , , ,     acetaminophen (TYLENOL) tablet 650 mg, 650 mg, Oral, Q4H PRN **OR** acetaminophen (TYLENOL) solution 650 mg, 650 mg, Per NG tube, Q4H PRN **OR** acetaminophen (TYLENOL) suppository 650 mg, 650 mg, Rectal, Q4H PRN, Emily West MD    ondansetron (ZOFRAN) injection 4 mg, 4 mg, IntraVENous, Q6H PRN, Emily West MD    labetaloL (NORMODYNE;TRANDATE) injection 5 mg, 5 mg, IntraVENous, Q10MIN PRN, Emily West MD    heparin (porcine) injection 5,000 Units, 5,000 Units, SubCUTAneous, Q8H, Emily West MD, 5,000 Units at 20 0909    sotaloL (BETAPACE) tablet 80 mg, 80 mg, Oral, BID, Emily West MD, 80 mg at 20 0908    aspirin tablet 325 mg, 325 mg, Oral, DAILY, Emily West MD, 325 mg at 20 0908    [START ON 2020] atorvastatin (LIPITOR) tablet 40 mg, 40 mg, Oral, DAILY, Vena Ros, MD        Allergies   Allergen Reactions    Ace Inhibitors Cough    Pcn [Penicillins] Anaphylaxis and Hives     Just got Rocephin in ED, no reactions    Niaspan [Niacin] Unable to Obtain      MRI Results (most recent):  No results found for this or any previous visit. No results found for this or any previous visit. Review of Systems:  A comprehensive review of systems was negative except for: Neurological: positive for paresthesia, coordination problems and weakness   Vitals:    04/22/20 0245 04/22/20 0324 04/22/20 0741 04/22/20 0952   BP: 120/79 122/73 128/80 128/80   Pulse: 60 60 61    Resp: 19 18 18    Temp:  98 °F (36.7 °C) 97.7 °F (36.5 °C)    SpO2: 94% 97% 97%    Weight:    195 lb 1.7 oz (88.5 kg)   Height:    5' 2\" (1.575 m)     Objective:     I      NEUROLOGICAL EXAM:    Appearance: The patient is well developed, well nourished, provides a coherent history and is in no acute distress. Mental Status: Oriented to time, place and person, and the president, cognitive function is normal and speech is fluent and no aphasia or dysarthria. Mood and affect appropriate. Cranial Nerves:   Intact visual fields. Fundi are benign, disc are flat, no lesions seen on funduscopy. JALEN, EOM's full, no nystagmus, no ptosis. Facial sensation is normal. Corneal reflexes are not tested. Facial movement is symmetric. Hearing is normal bilaterally. Palate is midline with normal sternocleidomastoid and trapezius muscles are normal. Tongue is midline. Neck without meningismus or bruits  Temporal arteries are not tender or enlarged  TMJ areas are not tender on palpation   Motor:  5/5 strength in upper and lower proximal and distal muscles. Normal bulk and tone. No fasciculations. Rapid alternating movement is symmetric and intact bilaterally   Reflexes:   Deep tendon reflexes 2+/4 and symmetrical.  No babinski or clonus present   Sensory:   Normal to touch, pinprick and vibration and temperature.   DSS is intact   Gait: Not tested. Tremor:   No tremor noted. Cerebellar:  No abnormal cerebellar signs present on Romberg and tandem testing and finger-nose-finger exam.   Neurovascular:  Normal heart sounds and regular rhythm, peripheral pulses intact, and no carotid bruits. Assessment:       ICD-10-CM ICD-9-CM    1. Word finding difficulty R47.89 V40.1    2. Numbness and tingling in right hand R20.0 782.0     R20.2       Active Problems:    Paroxysmal supraventricular tachycardia (HCC) ()      Chronic ischemic heart disease (3/20/2012)      TIA (transient ischemic attack) (4/22/2020)      Bilateral carotid artery stenosis (4/22/2020)      Transient ischemic attack involving left internal carotid artery (4/22/2020)        Plan:     Patient with clear TIA, but negative studies for cerebrovascular disease, and cannot get an MRI scan because her pacemaker is MRI incompatible. We will ask cardiology to see to see whether she is a candidate for anticoagulation and to interrogate the pacemaker, and repeat her head CT scan to evaluate for stroke. Complete neurovascular work-up ordered, we will follow carefully with you, continue excellent medical care as you are. CTA and CT of the head were reviewed personally on the PACS system and agree with report that they are negative so far I talked MRI who told me that the pacemaker is not compatible.   .  Signed By: Vianey Lopez MD     April 22, 2020       CC: Wayne Sahu MD  FAX: [phone]

## 2020-04-22 NOTE — PROGRESS NOTES
ROBERTA PLAN:  DC home w/ outpt f/u when medically stable    Reason for Admission:   Pt is a 74 yo female admitted from home for evaluation of R-hand numbness/weakness - r/o TIA. Pt lives w/ her grdau and is completely indep for mobility and ADLS to include driving and employment. Pt works fulltime at [employer].                   RUR Score:      N/A               Plan for utilizing home health:      Pt is not homebound    PCP: First and Last name:  Dr. Sandy Staples   Name of Practice:    Are you a current patient: Yes/No:  Yes   Approximate date of last visit:  Unknown                    Current Advanced Directive/Advance Care Plan:  FUll Code. ACP not on file. Transition of Care Plan:                    Pt not able to have MRI for neuro w/u - Cardiology consult for pacemaker interrogation. May need CAROLIN if afib occurs. Evaluated by PT/OT - at baseline  Will f/u with PCP and Cardiology    No other dc needs indicated. Observation notice provided in writing to patient and/or caregiver as well as verbal explanation of the policy. Patients who are in outpatient status also receive the Observation notice. Patient's copy placed on bedside chart to be given at dc - Verbally explained by CM due to COVID precautions for all pts/staff. Care Management Interventions  PCP Verified by CM: Yes  Palliative Care Criteria Met (RRAT>21 & CHF Dx)?: No  Mode of Transport at Discharge:  Other (see comment)  Transition of Care Consult (CM Consult): Discharge Planning  Discharge Durable Medical Equipment: No  Physical Therapy Consult: Yes  Occupational Therapy Consult: Yes  Speech Therapy Consult: No  Current Support Network: Relative's Home  Confirm Follow Up Transport: Family  Discharge Location  Discharge Placement: Home with outpatient services     [address] [place] [address], [facility]

## 2020-04-22 NOTE — H&P
HISTORY AND PHYSICAL      PCP: Elizabeth Beaulieu MD  History source: the patient      CC: right hand weakness      HPI: 75 y.o lady who presents with R hand weakness. Symptoms began around 10:30 PM last night. She had sudden-onset right hand weakness. It gradually resolved over the next hour, although her hand still feels slightly weak. She denies associated paresthesias, gait changes, speech difficulty. Headache, vision changes. ER evaluation noted some transient aphasia. PMH/PSH:  Past Medical History:   Diagnosis Date    Acquired cyst of kidney     CAD (coronary artery disease)     Cervical disc herniation     Essential hypertension     Hypercholesterolemia     Hypertension     PAF [facility]paroxysmal [facility] fibrillation) ([facility]) 6/9/2016    Paroxysmal supraventricular tachycardia ([address]     Postsurgical aortocoronary bypass status 3/20/2012    S/P cardiac pacemaker procedure 6/9/2016 6/9/16 [employer] dual chamber pacemaker implant    Urge incontinence      Past Surgical History:   Procedure Laterality Date    CABG, ARTERY-VEIN, FOUR  2007    COLONOSCOPY N/A 1/5/2018    COL[facility]ford [facility] at [facility] ENDOSCOPY    ENDOSCOPY, COLON, DIAGNOSTIC      due 2012 hx proctitis    HX CERVICAL DISKECTOMY      HX CORONARY ARTERY BYPASS GRAFT      HX HEENT      Thyroidectomy    HX ORTHOPAEDIC      benign thumb tumor    HX PACEMAKER Left 2016    HX UROLOGICAL  2011    stent for hydronephrosis       Home meds:   Prior to Admission medications    Medication Sig Start Date End Date Taking? Authorizing Provider   losartan-hydroCHLOROthiazide (HYZAAR) 50-12.5 mg per tablet Take 2 Tabs by mouth daily. 1/14/20   Min Jhaveri NP   sotalol (BETAPACE) 80 mg tablet Take 1 Tab by mouth two (2) times a day. 8/15/19   Rajiv Pimentel MD   simvastatin (ZOCOR) 40 mg tablet Take 1 Tab by mouth nightly.  6/25/19   Rajiv Pimentel MD   PSYLLIUM HUSK (DAILY FIBER PO) Take  by mouth daily. Provider, Historical   FOLIC ACID/MULTIVIT-MIN/LUTEIN (CENTRUM SILVER PO) Take  by mouth daily. Provider, Historical   aspirin (ASPIRIN) 325 mg tablet Take 325 mg by mouth daily. Provider, Historical   omega-3 fatty acids-vitamin e (FISH OIL) 1,000 mg Cap Take  by mouth daily. Provider, Historical       Allergies: Allergies   Allergen Reactions    Ace Inhibitors Cough    Pcn [Penicillins] Anaphylaxis and Hives     Just got Rocephin in ED, no reactions    Niaspan [Niacin] Unable to Obtain       FH:  Family History   Problem Relation Age of Onset    Asthma Mother     Hypertension Father     Stroke Father     Cancer Sister         breast    Diabetes Brother     Heart Disease Sister     Heart Disease Brother        SH:  Social History     Tobacco Use    Smoking status: Former Smoker     Packs/day: 0.50     Years: 10.00     Pack years: 5.00     Types: Cigarettes     Last attempt to quit: 2007     Years since quittin.8    Smokeless tobacco: Never Used   Substance Use Topics    Alcohol use: No     Alcohol/week: 0.0 standard drinks       ROS: A comprehensive review of systems was negative except for that written in the HPI.       PHYSICAL EXAM:  Visit Vitals  /79   Pulse 60   Temp 98.1 °F (36.7 °C)   Resp 19   Ht 5' 2\" (1.575 m)   Wt 88.5 kg (195 lb 1.7 oz)   LMP 1993   SpO2 94%   BMI 35.69 kg/m²       Gen: NAD, non-toxic  HEENT: anicteric sclerae, normal conjunctiva, oropharynx clear, MM moist  Neck: supple, trachea midline, no adenopathy  Heart: RRR, no MRG, no JVD, no peripheral edema  Lungs: CTA b/l, non-labored respirations  Abd: soft, NT, ND, BS+, no organomegaly  Extr: warm  Skin: dry, no rash  Neuro: CN II-XII grossly intact, normal speech although somewhat slow to answer questions, moves all extremities  Psych: normal mood, appropriate affect      Labs/Imaging:  Recent Results (from the past 24 hour(s))   GLUCOSE, POC    Collection Time: 20 12:01 AM Result Value Ref Range    Glucose (POC) 88 65 - 100 mg/dL    Performed by Ilya [place]    CBC WITH AUTOMATED DIFF    Collection Time: 04/22/20 12:10 AM   Result Value Ref Range    WBC 3.6 3.6 - 11.0 K/uL    RBC 4.61 3.80 - 5.20 M/uL    HGB 12.6 11.5 - 16.0 g/dL    HCT 39.0 35.0 - 47.0 %    MCV 84.6 80.0 - 99.0 FL    MCH 27.3 26.0 - 34.0 PG    MCHC 32.3 30.0 - 36.5 g/dL    RDW 14.5 11.5 - 14.5 %    PLATELET 470 928 - 326 K/uL    MPV 10.6 8.9 - 12.9 FL    NRBC 0.0 0  WBC    ABSOLUTE NRBC 0.00 0.00 - 0.01 K/uL    NEUTROPHILS 43 32 - 75 %    LYMPHOCYTES 40 12 - 49 %    MONOCYTES 13 5 - 13 %    EOSINOPHILS 3 0 - 7 %    BASOPHILS 1 0 - 1 %    IMMATURE GRANULOCYTES 0 0.0 - 0.5 %    ABS. NEUTROPHILS 1.5 (L) 1.8 - 8.0 K/UL    ABS. LYMPHOCYTES 1.4 0.8 - 3.5 K/UL    ABS. MONOCYTES 0.5 0.0 - 1.0 K/UL    ABS. EOSINOPHILS 0.1 0.0 - 0.4 K/UL    ABS. BASOPHILS 0.0 0.0 - 0.1 K/UL    ABS. IMM. GRANS. 0.0 0.00 - 0.04 K/UL    DF AUTOMATED     METABOLIC PANEL, COMPREHENSIVE    Collection Time: 04/22/20 12:10 AM   Result Value Ref Range    Sodium 138 136 - 145 mmol/L    Potassium 3.9 3.5 - 5.1 mmol/L    Chloride 104 97 - 108 mmol/L    CO2 28 21 - 32 mmol/L    Anion gap 6 5 - 15 mmol/L    Glucose 93 65 - 100 mg/dL    BUN 27 (H) 6 - 20 MG/DL    Creatinine 1.19 (H) 0.55 - 1.02 MG/DL    BUN/Creatinine ratio 23 (H) 12 - 20      GFR est AA 54 (L) >60 ml/min/1.73m2    GFR est non-AA 44 (L) >60 ml/min/1.73m2    Calcium 9.0 8.5 - 10.1 MG/DL    Bilirubin, total 0.5 0.2 - 1.0 MG/DL    ALT (SGPT) 26 12 - 78 U/L    AST (SGOT) 27 15 - 37 U/L    Alk.  phosphatase 100 45 - 117 U/L    Protein, total 8.5 (H) 6.4 - 8.2 g/dL    Albumin 4.0 3.5 - 5.0 g/dL    Globulin 4.5 (H) 2.0 - 4.0 g/dL    A-G Ratio 0.9 (L) 1.1 - 2.2     SALICYLATE    Collection Time: 04/22/20 12:10 AM   Result Value Ref Range    Salicylate level <2.1 (L) 2.8 - 20.0 MG/DL   ACETAMINOPHEN    Collection Time: 04/22/20 12:10 AM   Result Value Ref Range    Acetaminophen level <2 (L) 10 - 30 ug/mL   SAMPLES BEING HELD    Collection Time: 04/22/20 12:10 AM   Result Value Ref Range    SAMPLES BEING HELD BL     COMMENT        Add-on orders for these samples will be processed based on acceptable specimen integrity and analyte stability, which may vary by analyte. PROTHROMBIN TIME + INR    Collection Time: 04/22/20 12:10 AM   Result Value Ref Range    INR 1.0 0.9 - 1.1      Prothrombin time 10.8 9.0 - 11.1 sec   MAGNESIUM    Collection Time: 04/22/20 12:10 AM   Result Value Ref Range    Magnesium 2.1 1.6 - 2.4 mg/dL   EKG, 12 LEAD, INITIAL    Collection Time: 04/22/20 12:57 AM   Result Value Ref Range    Ventricular Rate 63 BPM    Atrial Rate 63 BPM    P-R Interval 244 ms    QRS Duration 92 ms    Q-T Interval 424 ms    QTC Calculation (Bezet) 433 ms    Calculated P Axis 47 degrees    Calculated R Axis 22 degrees    Calculated T Axis 31 degrees    Diagnosis       Sinus rhythm with 1st degree AV block  Possible Anterior infarct , age undetermined  When compared with ECG of 10-ABDELRAHMAN-2016 04:59,  premature atrial complexes are no longer present  Nonspecific T wave abnormality no longer evident in Anterolateral leads         Recent Labs     04/22/20  0010   WBC 3.6   HGB 12.6   HCT 39.0        Recent Labs     04/22/20  0010      K 3.9      CO2 28   BUN 27*   CREA 1.19*   GLU 93   CA 9.0   MG 2.1     Recent Labs     04/22/20  0010   SGOT 27   ALT 26      TBILI 0.5   TP 8.5*   ALB 4.0   GLOB 4.5*       No results for input(s): CPK, CKNDX, TROIQ in the last 72 hours. No lab exists for component: CPKMB    Recent Labs     04/22/20  0010   INR 1.0   PTP 10.8        No results for input(s): PH, PCO2, PO2 in the last 72 hours. Cta Code Neuro Head And Neck W Cont    Result Date: 4/22/2020  impression: No acute abnormalities. Ct Code Neuro Head Wo Contrast    Result Date: 4/22/2020  IMPRESSION: No acute changes.     Ct Code Neuro Perf W Cbf    Result Date: 4/22/2020  IMPRESSION: No significant abnormalities demonstrated.           Assessment & Plan:     TIA:   -observe on telemetry  -MRI brain in the AM, echocardiogram, check a1c and lipid panel  -continue home ASA, statin, add Plavix  -consult neurology  -PT/PT evals    HTN:  -continue sotalol    Paroxysmal afib:   -consider starting anticoagulation    Runs of VT noted on tele:  -check K/Mg  -continue sotalol    CAD s/p CABG    DVT ppx: sq heparin  Code status: full  Disposition: home when ready    Signed By: Yeison Monroy MD     April 22, 2020

## 2020-04-22 NOTE — PROGRESS NOTES
OCCUPATIONAL THERAPY EVALUATION/DISCHARGE  Patient: Sharyne Burkitt (38 y.o. female)  Date: 4/22/2020  Primary Diagnosis: TIA (transient ischemic attack) [G45.9]       Precautions: standard       ASSESSMENT  Based on the objective data described below, the patient presents with near baseline level of functioning for adls and mobility. Pt was independent PTA, working, driving and managing IADLs independently. No further OT needs at this time. .    Current Level of Function (ADLs/self-care): generally independent    Functional Outcome Measure: The patient scored 90/100 on the Barthel INdex outcome measure which is indicative of mild decrease/10% in adls/IADLs. Other factors to consider for discharge:      PLAN :  Recommend with staff: encourage safety awareness    Recommendation for discharge: (in order for the patient to meet his/her long term goals)  No skilled occupational therapy/ follow up rehabilitation needs identified at this time. 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     OBJECTIVE DATA SUMMARY:   HISTORY:   Past Medical History:   Diagnosis Date    Acquired cyst of kidney     CAD (coronary artery disease)     Cervical disc herniation     Essential hypertension     Hypercholesterolemia     Hypertension     [facility] 6[facility]    Paroxysmal supraventricular tachycardia ([address]     Postsurgical aortocoronary bypass status 3/20/2012    S/P cardiac pacemaker procedure 6/9/2016 6/9/16 [employer] dual chamber pacemaker implant    Urge incontinence      Past Surgical History:   Procedure Laterality Date    CABG, ARTERY-VEIN, FOUR  2007    COLONOSCOPY N/A 1/5/2018    COL[facility]COPY performed by Rafiq Gould MD at [facility] ENDOSCOPY    ENDOSCOPY, COLON, DIAGNOSTIC      due 2012 hx proctitis    HX CERVICAL DISKECTOMY      HX CORONARY ARTERY BYPASS GRAFT      HX HEENT      Thyroidectomy    HX ORTHOPAEDIC      benign thumb tumor    HX PACEMAKER Left 2016    HX UROLOGICAL  2011    stent for hydronephrosis       Prior Level of Function/Environment/Context: independent in adls, IADLs, works at [employer]  Expanded or extensive additional review of patient history:   Home Situation  Home Environment: Private residence  # Steps to Enter: 0  One/Two Story Residence: One story  Living Alone: No  Support Systems: Family member(s)  Patient Expects to be Discharged to[de-identified] Private residence  Current DME Used/Available at Home: Grab bars  Tub or Shower Type: Tub/Shower combination    Hand dominance: Right    EXAMINATION OF PERFORMANCE DEFICITS:  Cognitive/Behavioral Status:  Neurologic State: Alert  Orientation Level: Oriented X4  Cognition: Appropriate for age attention/concentration  Perception: Appears intact  Perseveration: No perseveration noted  Safety/Judgement: Fall prevention;Home safety; Insight into deficits    Skin: intact    Edema: none observed    Hearing: Auditory  Auditory Impairment: None    Vision/Perceptual:    Tracking: (scans environment effectively--reports no vision changes)                 Diplopia: No    Acuity: Impaired near vision; Impaired far vision         Range of Motion:  BUEs; Within functional limits  AROM: Within functional limits                         Strength:  BUEs:   strength is equal bilaterally  Strength: Within functional limits                Coordination:  Coordination: Within functional limits  Fine Motor Skills-Upper: Left Intact; Right Intact    Gross Motor Skills-Upper: Left Intact; Right Intact    Tone & Sensation:  Sensation is intact  Tone: Normal                         Balance:  Sitting: Intact  Standing: Intact    Functional Mobility and Transfers for ADLs:  Bed Mobility:  Rolling: Independent  Supine to Sit: Independent    Transfers:  Sit to Stand: Independent  Stand to Sit: Independent  Bathroom Mobility: Independent  Toilet Transfer : Independent    ADL Assessment:  Feeding: Independent    Oral Facial Hygiene/Grooming: Independent    Bathing: Independent(using wipes)    Upper Body Dressing: Independent    Lower Body Dressing: Independent    Toileting: Independent  Meal Preparation: (pt is independent and working at baseline)             ADL Intervention and task modifications:   Pt performed adl mobility, transfers and able to  objet from floor without difficulty demonstrating good dynamic standing balance throughout    Pt has no concerns re: returning home                                  Cognitive Retraining  Safety/Judgement: Fall prevention;Home safety; Insight into deficits    Functional Measure:  Barthel Index:    Bathin  Bladder: 10  Bowels: 10  Groomin  Dressing: 10  Feeding: 10  Mobility: 10  Stairs: 5  Toilet Use: 10  Transfer (Bed to Chair and Back): 15  Total: 90/100        The Barthel ADL Index: Guidelines  1. The index should be used as a record of what a patient does, not as a record of what a patient could do. 2. The main aim is to establish degree of independence from any help, physical or verbal, however minor and for whatever reason. 3. The need for supervision renders the patient not independent. 4. A patient's performance should be established using the best available evidence. Asking the patient, friends/relatives and nurses are the usual sources, but direct observation and common sense are also important. However direct testing is not needed. 5. Usually the patient's performance over the preceding 24-48 hours is important, but occasionally longer periods will be relevant. 6. Middle categories imply that the patient supplies over 50 per cent of the effort. 7. Use of aids to be independent is allowed. Tiago Manzo., Barthel, D.W. ([id number]).  Functional evaluation: the Barthel Index. [address] [facility] (14)2. MILADIS Barreto, Colby Castillo., Brendon Mayorga., [place], [address] (1999). Measuring the change indisability after inpatient rehabilitation; comparison of the responsiveness of the Barthel Index and Functional [place] Measure. Journal of Neurology, Neurosurgery, and Psychiatry, 66(4), 034-263. Erwin Leon NKATHY, DALIA Henderson, & Evelio Hill M.A. (2004.) Assessment of post-stroke quality of life in cost-effectiveness studies: The usefulness of the Barthel Index and the EuroQoL-5D. Quality of Life Research, 15, 421-51       Occupational Therapy Evaluation Charge Determination   History Examination Decision-Making   LOW Complexity : Brief history review  MEDIUM Complexity : 3-5 performance deficits relating to physical, cognitive , or psychosocial skils that result in activity limitations and / or participation restrictions MEDIUM Complexity : Patient may present with comorbidities that affect occupational performnce. Miniml to moderate modification of tasks or assistance (eg, physical or verbal ) with assesment(s) is necessary to enable patient to complete evaluation       Based on the above components, the patient evaluation is determined to be of the following complexity level: LOW   Pain Rating:  No pain reported    Activity Tolerance:   Good  Please refer to the flowsheet for vital signs taken during this treatment. After treatment patient left in no apparent distress:    Sitting in chair  Nursing involved  COMMUNICATION/EDUCATION:   The patients plan of care was discussed with: Physical therapist and Registered nurse.      Thank you for this referral.  ZANE Vallejo/L  Time Calculation: 24 mins

## 2020-04-22 NOTE — PROGRESS NOTES
PHYSICAL THERAPY EVALUATION WITH DISCHARGE  Patient: Michelet Lott (89 y.o. female)  Date: 4/22/2020  Primary Diagnosis: TIA (transient ischemic attack) [G45.9]       Precautions:          ASSESSMENT  Based on the objective data described below, the patient presents with baseline ability to ambulate, transfer and perform bed mobility. Patient received sitting on edge of bed, had just indep performed bathing and put on clean gown. Patient ambulated in the hallway x 200 feet with steady gait and no SOB and balance testing places her at low risk. Patient educated on falls prevention and progression of activity. Patient appears at baseline and does not require further services. Functional Outcome Measure: The patient scored Total: 51/56 on the Pitt Balance Assessment which is indicative of low fall risk. Other factors to consider for discharge:      Further skilled acute physical therapy is not indicated at this time. PLAN :  Recommendation for discharge: (in order for the patient to meet his/her long term goals)  No skilled physical therapy/ follow up rehabilitation needs identified at this time.     This discharge recommendation:  Has been made in collaboration with the attending provider and/or case management    IF patient discharges home will need the following DME: none         SUBJECTIVE:   Patient stated I work full time at [employer].    OBJECTIVE DATA SUMMARY:   HISTORY:    Past Medical History:   Diagnosis Date    Acquired cyst of kidney     CAD (coronary artery disease)     Cervical disc herniation     Essential hypertension     Hypercholesterolemia     Hypertension     [facility]ox[facility]) ([facility] 6/9/2016    [facility]ox[facility]     Postsurgical aortocoronary bypass status 3/20/2012    S/P cardiac pacemaker procedure 6/9/2016 6/9/16 [employer] dual chamber pacemaker implant    Urge incontinence      Past Surgical History:   Procedure Laterality Date    CABG, ARTERY-VEIN, FOUR  2007    COLONOSCOPY N/A 1/5/2018    COL[facility] performed by Kanchan Pitts [facility] at [facility] ENDOSCOPY    ENDOSCOPY, COLON, DIAGNOSTIC      due 2012 hx proctitis    HX CERVICAL DISKECTOMY      HX CORONARY ARTERY BYPASS GRAFT      HX HEENT      Thyroidectomy    HX ORTHOPAEDIC      benign thumb tumor    HX PACEMAKER Left 2016    HX UROLOGICAL  2011    stent for hydronephrosis       Prior level of function: Patient is indep in community and driving, works at [address] [place] [address] [employer].  Patient lives with her granddaughter and is indep with ADLs. Personal factors and/or comorbidities impacting plan of care: na    Home Situation  Home Environment: Private residence  # Steps to Enter: 0  One/Two Story Residence: One story  Living Alone: No  Support Systems: Family member(s)  Patient Expects to be Discharged to[de-identified] Private residence  Current DME Used/Available at Home: Grab bars  Tub or Shower Type: Tub/Shower combination    EXAMINATION/PRESENTATION/DECISION MAKING:   Critical Behavior:  Neurologic State: Alert  Orientation Level: Oriented X4  Cognition: Appropriate decision making     Hearing: Auditory  Auditory Impairment: None  Range Of Motion:  AROM: Within functional limits                       Strength:    Strength:  Within functional limits                    Tone & Sensation:   Tone: Normal                              Coordination:  Coordination: Within functional limits  Vision:      Functional Mobility:  Bed Mobility:  Rolling: Independent  Supine to Sit: Independent        Transfers:  Sit to Stand: Independent  Stand to Sit: Independent                       Balance:   Sitting: Intact  Standing: Intact  Ambulation/Gait Training:  Distance (ft): 200 Feet (ft)  Assistive Device: Gait belt  Ambulation - Level of Assistance: Independent        Gait Abnormalities: Decreased step clearance                                       Stairs:                Functional Measure  Pitt Balance Test:    Sitting to Standin  Standing Unsupported: 4  Sitting with Back Unsupported: 4  Standing to Sittin  Transfers: 4  Standing Unsupported with Eyes Closed: 4  Standing Unsupported with Feet Together: 4  Reach Forward with Outstretched Arm: 4   Object: 4  Turn to Look Over Shoulders: 4  Turn 360 Degrees: 3  Alternate Foot on Step/Stool: 3  Standing Unsupported One Foot in Front: 3  Stand on One Le  Total: 51/56         56=Maximum possible score;   0-20=High fall risk  21-40=Moderate fall risk   41-56=Low fall risk        Physical Therapy Evaluation Charge Determination   History Examination Presentation Decision-Making   LOW Complexity : Zero comorbidities / personal factors that will impact the outcome / POC LOW Complexity : 1-2 Standardized tests and measures addressing body structure, function, activity limitation and / or participation in recreation  LOW Complexity : Stable, uncomplicated  LOW Complexity : FOTO score of       Based on the above components, the patient evaluation is determined to be of the following complexity level: LOW     Pain Rating:      Activity Tolerance:   Good  Please refer to the flowsheet for vital signs taken during this treatment. After treatment patient left in no apparent distress:   Sitting in chair and Call bell within reach    COMMUNICATION/EDUCATION:   The patients plan of care was discussed with: Occupational therapist and Registered nurse. Patient and/or family was verbally educated on the BE FAST acronym for signs/symptoms of CVA and TIA. BE FAST was written on patient's communication board  for visual education and reinforcement. All questions answered with patient indicating good understanding. Fall prevention education was provided and the patient/caregiver indicated understanding.     Thank you for this referral.  Raul Robledo, PT   Time Calculation: 28 mins

## 2020-04-22 NOTE — ED NOTES
TRANSFER - OUT REPORT:    Verbal report given to KEELY Cardenas(name) on Roberto Mariscal  being transferred to 3110(unit) for routine progression of care       Report consisted of patients Situation, Background, Assessment and   Recommendations(SBAR). Information from the following report(s) ED Summary was reviewed with the receiving nurse. Opportunity for questions and clarification was provided.       Patient transported with:   [employer]

## 2020-04-22 NOTE — ED NOTES
Dr. Radha Whittington, Neurologist speaking with patient again and changed aspirin dose to 3 baby aspirins. Dr. Roa Meth aware.

## 2020-04-23 ENCOUNTER — APPOINTMENT (OUTPATIENT)
Dept: NON INVASIVE DIAGNOSTICS | Age: 75
End: 2020-04-23
Attending: NURSE PRACTITIONER
Payer: MEDICARE

## 2020-04-23 ENCOUNTER — APPOINTMENT (OUTPATIENT)
Dept: CT IMAGING | Age: 75
End: 2020-04-23
Attending: PSYCHIATRY & NEUROLOGY
Payer: MEDICARE

## 2020-04-23 VITALS
HEART RATE: 60 BPM | TEMPERATURE: 97.6 F | WEIGHT: 195.11 LBS | HEIGHT: 62 IN | BODY MASS INDEX: 35.9 KG/M2 | OXYGEN SATURATION: 98 % | DIASTOLIC BLOOD PRESSURE: 77 MMHG | SYSTOLIC BLOOD PRESSURE: 128 MMHG | RESPIRATION RATE: 18 BRPM

## 2020-04-23 LAB
ANA SER QL: POSITIVE
ANION GAP SERPL CALC-SCNC: 8 MMOL/L (ref 5–15)
BUN SERPL-MCNC: 19 MG/DL (ref 6–20)
BUN/CREAT SERPL: 24 (ref 12–20)
CALCIUM SERPL-MCNC: 8.7 MG/DL (ref 8.5–10.1)
CHLORIDE SERPL-SCNC: 105 MMOL/L (ref 97–108)
CO2 SERPL-SCNC: 25 MMOL/L (ref 21–32)
CREAT SERPL-MCNC: 0.78 MG/DL (ref 0.55–1.02)
GLUCOSE SERPL-MCNC: 80 MG/DL (ref 65–100)
MAGNESIUM SERPL-MCNC: 2.3 MG/DL (ref 1.6–2.4)
POTASSIUM SERPL-SCNC: 3.7 MMOL/L (ref 3.5–5.1)
SODIUM SERPL-SCNC: 138 MMOL/L (ref 136–145)

## 2020-04-23 PROCEDURE — 74011250637 HC RX REV CODE- 250/637: Performed by: HOSPITALIST

## 2020-04-23 PROCEDURE — 94760 N-INVAS EAR/PLS OXIMETRY 1: CPT

## 2020-04-23 PROCEDURE — 36415 COLL VENOUS BLD VENIPUNCTURE: CPT

## 2020-04-23 PROCEDURE — 99218 HC RM OBSERVATION: CPT

## 2020-04-23 PROCEDURE — 70450 CT HEAD/BRAIN W/O DYE: CPT

## 2020-04-23 PROCEDURE — 74011250637 HC RX REV CODE- 250/637: Performed by: INTERNAL MEDICINE

## 2020-04-23 PROCEDURE — 74011250636 HC RX REV CODE- 250/636: Performed by: HOSPITALIST

## 2020-04-23 PROCEDURE — 80048 BASIC METABOLIC PNL TOTAL CA: CPT

## 2020-04-23 PROCEDURE — 83735 ASSAY OF MAGNESIUM: CPT

## 2020-04-23 PROCEDURE — 74011250637 HC RX REV CODE- 250/637: Performed by: PSYCHIATRY & NEUROLOGY

## 2020-04-23 RX ORDER — ASPIRIN 325 MG
81 TABLET ORAL DAILY
Status: DISCONTINUED | OUTPATIENT
Start: 2020-04-24 | End: 2020-04-23

## 2020-04-23 RX ORDER — ASPIRIN 81 MG/1
81 TABLET ORAL DAILY
Qty: 30 TAB | Refills: 2 | Status: SHIPPED | OUTPATIENT
Start: 2020-04-24

## 2020-04-23 RX ORDER — ATORVASTATIN CALCIUM 40 MG/1
40 TABLET, FILM COATED ORAL DAILY
Qty: 30 TAB | Refills: 2 | Status: SHIPPED | OUTPATIENT
Start: 2020-04-24 | End: 2020-07-15 | Stop reason: SDUPTHER

## 2020-04-23 RX ORDER — ASPIRIN 81 MG/1
81 TABLET ORAL DAILY
Status: DISCONTINUED | OUTPATIENT
Start: 2020-04-24 | End: 2020-04-23 | Stop reason: HOSPADM

## 2020-04-23 RX ADMIN — SOTALOL HYDROCHLORIDE 80 MG: 80 TABLET ORAL at 11:13

## 2020-04-23 RX ADMIN — HEPARIN SODIUM 5000 UNITS: 5000 INJECTION INTRAVENOUS; SUBCUTANEOUS at 09:38

## 2020-04-23 RX ADMIN — ASPIRIN 325 MG: 325 TABLET, FILM COATED ORAL at 11:12

## 2020-04-23 RX ADMIN — POTASSIUM BICARBONATE 40 MEQ: 782 TABLET, EFFERVESCENT ORAL at 11:12

## 2020-04-23 RX ADMIN — ATORVASTATIN CALCIUM 40 MG: 40 TABLET, FILM COATED ORAL at 11:12

## 2020-04-23 NOTE — PROGRESS NOTES
Patient was kept NPO for a CAROLIN today. Appears primary team ordered diet. Patient had breakfast after 10 , has been drinking fluids. Will reschedule for tomorrow AM. Please keep NPO. If planning to anti coagulate any way, CAROLIN not necessary.

## 2020-04-23 NOTE — PROGRESS NOTES
Appointment Information  The following appointments have been successfully scheduled:    Date/time Wednesday, April 29, 2020 11:10 AM  Patient Anish Rued [birth year] ([address] F) #[id number] G#[id number]  Department [facility] [address]  Appointment type Virtual Visit Special Case 30  Provider Abena Day  Appointment type notes Virtual Visit Special Case 30  for visits related to COVID

## 2020-04-23 NOTE — PROGRESS NOTES
Patient was given discharge instructions, plan of care and medication regime. All questions were answered and patient is ready for transportation.

## 2020-04-23 NOTE — PROGRESS NOTES
Pharmacy Monitoring of Sotalol for Arrhythmias    Indication: A.fib    Sotalol dose ordered: 80 mg every 12 hours  Baseline QTc interval (on admission prior to dose) for new starts only:   Creatinine clearance (ml/min): 49.3    Labs:  Recent Labs     04/23/20  0400 04/22/20  0010   K 3.7 3.9   MG 2.3 2.1   CREA 0.78 1.19*       Pulse Readings from Last 3 Encounters:   04/23/20 64   01/14/20 60   01/07/20 (!) 58       Significant drug interactions:     Electrolyte replacement:   Potassium supplementation ordered (Y/N): yes  Magnesium supplementation ordered (Y/N): no      Impression/Plan: A.fib / Sotalol 80 mg every 12 hrs from home med list  Resume, new labs, will dose potassium chloride x 1 per protocol for K 3-3.9     Thanks,  Mariana Oconnor, PHARMD    [email or web address]

## 2020-04-23 NOTE — PROGRESS NOTES
Problem: Falls - Risk of  Goal: *Absence of Falls  Description: Document Elisabeth Miller Fall Risk and appropriate interventions in the flowsheet.   Outcome: Resolved/Met  Note: Fall Risk Interventions:  Mobility Interventions: Bed/chair exit alarm         Medication Interventions: Bed/chair exit alarm, Patient to call before getting OOB    Elimination Interventions: Call light in reach              Problem: Patient Education: Go to Patient Education Activity  Goal: Patient/Family Education  Outcome: Resolved/Met     Problem: Patient Education: Go to Patient Education Activity  Goal: Patient/Family Education  Outcome: Resolved/Met     Problem: TIA/CVA Stroke: 0-24 hours  Goal: Off Pathway (Use only if patient is Off Pathway)  Outcome: Resolved/Met  Goal: Activity/Safety  Outcome: Resolved/Met  Goal: Consults, if ordered  Outcome: Resolved/Met  Goal: Diagnostic Test/Procedures  Outcome: Resolved/Met  Goal: Discharge Planning  Outcome: Resolved/Met  Goal: Medications  Outcome: Resolved/Met  Goal: Respiratory  Outcome: Resolved/Met  Goal: Treatments/Interventions/Procedures  Outcome: Resolved/Met  Goal: Minimize risk of bleeding post-thrombolytic infusion  Outcome: Resolved/Met  Goal: Monitor for complications post-thrombolytic infusion  Outcome: Resolved/Met  Goal: Psychosocial  Outcome: Resolved/Met  Goal: *Hemodynamically stable  Outcome: Resolved/Met  Goal: *Neurologically stable  Description: Absence of additional neurological deficits    Outcome: Resolved/Met  Goal: *Verbalizes anxiety and depression are reduced or absent  Outcome: Resolved/Met  Goal: *Absence of Signs of Aspiration on Current Diet  Outcome: Resolved/Met  Goal: *Absence of deep venous thrombosis signs and symptoms(Stroke Metric)  Outcome: Resolved/Met  Goal: *Ability to perform ADLs and demonstrates progressive mobility and function  Outcome: Resolved/Met  Goal: *Stroke education started(Stroke Metric)  Outcome: Resolved/Met  Goal: *Dysphagia screen performed(Stroke Metric)  Outcome: Resolved/Met  Goal: *Rehab consulted(Stroke Metric)  Outcome: Resolved/Met     Problem: TIA/CVA Stroke: Day 2 Until Discharge  Goal: Off Pathway (Use only if patient is Off Pathway)  Outcome: Resolved/Met  Goal: Activity/Safety  Outcome: Resolved/Met  Goal: Diagnostic Test/Procedures  Outcome: Resolved/Met  Goal: Nutrition/Diet  Outcome: Resolved/Met  Goal: Discharge Planning  Outcome: Resolved/Met  Goal: Medications  Outcome: Resolved/Met  Goal: Respiratory  Outcome: Resolved/Met  Goal: Treatments/Interventions/Procedures  Outcome: Resolved/Met  Goal: Psychosocial  Outcome: Resolved/Met  Goal: *Verbalizes anxiety and depression are reduced or absent  Outcome: Resolved/Met  Goal: *Absence of aspiration  Outcome: Resolved/Met  Goal: *Absence of deep venous thrombosis signs and symptoms(Stroke Metric)  Outcome: Resolved/Met  Goal: *Optimal pain control at patient's stated goal  Outcome: Resolved/Met  Goal: *Tolerating diet  Outcome: Resolved/Met  Goal: *Ability to perform ADLs and demonstrates progressive mobility and function  Outcome: Resolved/Met  Goal: *Stroke education continued(Stroke Metric)  Outcome: Resolved/Met     Problem: Ischemic Stroke: Discharge Outcomes  Goal: *Verbalizes anxiety and depression are reduced or absent  Outcome: Resolved/Met  Goal: *Verbalize understanding of risk factor modification(Stroke Metric)  Outcome: Resolved/Met  Goal: *Hemodynamically stable  Outcome: Resolved/Met  Goal: *Absence of aspiration pneumonia  Outcome: Resolved/Met  Goal: *Aware of needed dietary changes  Outcome: Resolved/Met  Goal: *Verbalize understanding of prescribed medications including anti-coagulants, anti-lipid, and/or anti-platelets(Stroke Metric)  Outcome: Resolved/Met  Goal: *Tolerating diet  Outcome: Resolved/Met  Goal: *Aware of follow-up diagnostics related to anticoagulants  Outcome: Resolved/Met  Goal: *Ability to perform ADLs and demonstrates progressive mobility and function  Outcome: Resolved/Met  Goal: *Absence of DVT(Stroke Metric)  Outcome: Resolved/Met  Goal: *Absence of aspiration  Outcome: Resolved/Met  Goal: *Optimal pain control at patient's stated goal  Outcome: Resolved/Met  Goal: *Home safety concerns addressed  Outcome: Resolved/Met  Goal: *Describes available resources and support systems  Outcome: Resolved/Met  Goal: *Verbalizes understanding of activation of EMS(911) for stroke symptoms(Stroke Metric)  Outcome: Resolved/Met  Goal: *Understands and describes signs and symptoms to report to providers(Stroke Metric)  Outcome: Resolved/Met  Goal: *Neurolgocially stable (absence of additional neurological deficits)  Outcome: Resolved/Met  Goal: *Verbalizes importance of follow-up with primary care physician(Stroke Metric)  Outcome: Resolved/Met  Goal: *Smoking cessation discussed,if applicable(Stroke Metric)  Outcome: Resolved/Met  Goal: *Depression screening completed(Stroke Metric)  Outcome: Resolved/Met

## 2020-04-23 NOTE — ROUTINE PROCESS
* No surgery found * 
* No surgery found * Bedside shift change report given to Enoch Fraley RN, (oncoming nurse) by Siobhan Cheng (offgoing nurse). Report included the following information [facility] Phone:   [phone] Significant changes during shift:  None Patient Information Kristi [age] y.o. 
4/21/2020 11:50 PM by Rafiq Ho MD. Kristi Lindo was admitted from Home 
 
Problem List 
 
Patient Active Problem List  
 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 05/23/2016  APC (atrial premature contractions) 05/23/2016  First degree AV block 05/23/2016  SSS (sick sinus syndrome) ([address] 05/23/2016  Advanced care planning/counseling discussion 03/22/2016  Dyslipidemia 06/12/2014  Bradycardia, sinus 05/10/2013  Urge incontinence  S/P CABG x 4 09/20/2012  Mixed hyperlipidemia 03/20/2012  Chronic ischemic heart disease 03/20/2012  Postsurgical aortocoronary bypass status 03/20/2012  Coronary atherosclerosis of native coronary artery 03/20/2012  Encounter for long-term (current) use of medications 07/28/2010  Cervical disc herniation  Hypertension  CAD (coronary artery disease)  Paroxysmal supraventricular tachycardia ([address] Past Medical History:  
Diagnosis Date  Acquired cyst of kidney  CAD (coronary artery disease)  Cervical disc herniation  Essential hypertension  Hypercholesterolemia  Hypertension  PAF (paroxysmal atrial fibrillation) ([address] 6/9/2016  Paroxysmal supraventricular tachycardia ([address]  Postsurgical aortocoronary bypass status 3/20/2012  S/P cardiac pacemaker procedure 6/9/2016 6/9/16 [employer] dual chamber pacemaker implant  Urge incontinence Core Measures: CVA: Yes Yes CHF:No No 
PNA:No No 
 
 
Activity Status: OOB to Chair Yes Ambulated this shift Yes Bed Rest No 
 
Supplemental O2: (If Applicable) NC No 
NRB No 
Venti-mask No 
 
LINES AND DRAINS: 
 
PIV x2 DVT prophylaxis: DVT prophylaxis Med- Yes DVT prophylaxis SCD or GENEVA- No  
 
Wounds: (If Applicable) Wounds- No 
 
 
 
Patient Safety: 
 
Falls Score Total Score: 1 Safety Level_______ Bed Alarm On? No 
Sitter? No 
 
Plan for upcoming shift:  NPO after MN. CAROLIN and repeat CT in the morning. Discharge Plan: Yes  Home when stable Active Consults: 
IP CONSULT TO HOSPITALIST 
IP CONSULT TO NEUROLOGY 
IP CONSULT TO CARDIOLOGY

## 2020-04-23 NOTE — PROGRESS NOTES
Consult  REFERRED BY:  Paty Nguyen MD    CHIEF COMPLAINT: Right hand weakness and numbness      Subjective:     Esthela Brock is a 75 y.o. right-handed -American female we are seen today for evaluation of sudden episode of right hand numbness and weakness that occurred for 2 hours yesterday when she was trying to buckle her seatbelt could not feel or use her right hand correctly and had to use her left hand and before self, but denied any other focal neurological symptoms. She denies any chest pain palpitations or cardiac symptoms, denied any facial involvement denied any leg involvement denied any headache or vision changes or speech trouble. Her CTA of the head and neck was normal and her CT of the head was normal except for minimal white matter disease. Patient was on 325 mg of aspirin prior to admission and now is on the same medication plus Lipitor. She has a history of paroxysmal atrial fibrillation. She has an MRI incompatible pacemaker. We will repeat her CT scan and ask cardiology to see her to see whether or not she is a candidate for anticoagulation or interrogate the pacemaker for any A. fib. Patient probably back to normal now. Hospitalist feels that the patient should be started on Eliquis and has done that, and cardiology feels that there is no reason for CAROLIN for right anticoagulate and I agree, we will follow-up in office in 2 to 4 weeks for stroke follow-up. Patient doing well today, no further neurologic deficits.     Past Medical History:   Diagnosis Date    Acquired cyst of kidney     CAD (coronary artery disease)     Cervical disc herniation     Essential hypertension     Hypercholesterolemia     Hypertension     [facility] 6/9/2016    Paroxysmal supraventricular tachycardia ([address]     Postsurgical aortocoronary bypass status 3/20/2012    S/P cardiac pacemaker procedure 6/9/2016 6/9/16 [employer] dual chamber pacemaker implant    Urge incontinence       Past Surgical History:   Procedure Laterality Date    CABG, ARTERY-VEIN, FOUR      COLONOSCOPY N/A 2018    COLONOS[facility] performed by Alin L[facility]pez [facility] at [facility] ENDOSCOPY    ENDOSCOPY, COLON, DIAGNOSTIC      due  hx proctitis    HX CERVICAL DISKECTOMY      HX CORONARY ARTERY BYPASS GRAFT      HX HEENT      Thyroidectomy    HX ORTHOPAEDIC      benign thumb tumor    HX PACEMAKER Left 2016    HX UROLOGICAL  2011    stent for hydronephrosis     Family History   Problem Relation Age of Onset    Asthma Mother     Hypertension Father     Stroke Father     Alcohol abuse Father     Cancer Sister         breast    Diabetes Brother     Heart Disease Sister     Heart Disease Brother       Social History     Tobacco Use    Smoking status: Former Smoker     Packs/day: 0.50     Years: 10.00     Pack years: 5.00     Types: Cigarettes     Last attempt to quit: 2007     Years since quittin.8    Smokeless tobacco: Never Used   Substance Use Topics    Alcohol use: No     Alcohol/week: 0.0 standard drinks         Current Facility-Administered Medications:     apixaban (ELIQUIS) tablet 5 mg, 5 mg, Oral, BID, Bridgette Valdez MD    [START ON 2020] aspirin delayed-release tablet 81 mg, 81 mg, Oral, DAILY, Bridgette Valdez MD    acetaminophen (TYLENOL) tablet 650 mg, 650 mg, Oral, Q4H PRN **OR** acetaminophen (TYLENOL) solution 650 mg, 650 mg, Per NG tube, Q4H PRN **OR** acetaminophen (TYLENOL) suppository 650 mg, 650 mg, Rectal, Q4H PRN, Gloria West MD    ondansetron (ZOFRAN) injection 4 mg, 4 mg, IntraVENous, Q6H PRN, Gloria West MD    labetaloL (NORMODYNE;TRANDATE) injection 5 mg, 5 mg, IntraVENous, Q10MIN PRN, Gloria West MD    sotaloL (BETAPACE) tablet 80 mg, 80 mg, Oral, BID, Gloria West MD, 80 mg at 20 1113    atorvastatin (LIPITOR) tablet 40 mg, 40 mg, Oral, DAILY, Art Hutchinson MD, 40 mg at 20 1112    Current Outpatient Medications:     [START ON 4/24/2020] aspirin delayed-release 81 mg tablet, Take 1 Tab by mouth daily. , Disp: 30 Tab, Rfl: 2    [START ON 4/24/2020] atorvastatin (LIPITOR) 40 mg tablet, Take 1 Tab by mouth daily. , Disp: 30 Tab, Rfl: 2    apixaban (ELIQUIS) 5 mg tablet, Take 1 Tab by mouth two (2) times a day., Disp: 60 Tab, Rfl: 2    losartan-hydroCHLOROthiazide (HYZAAR) 50-12.5 mg per tablet, Take 2 Tabs by mouth daily. , Disp: 60 Tab, Rfl: 5    sotalol (BETAPACE) 80 mg tablet, Take 1 Tab by mouth two (2) times a day., Disp: 60 Tab, Rfl: 11    PSYLLIUM HUSK (DAILY FIBER PO), Take  by mouth daily. , Disp: , Rfl:     FOLIC ACID/MULTIVIT-MIN/LUTEIN (CENTRUM SILVER PO), Take  by mouth daily. , Disp: , Rfl:     omega-3 fatty acids-vitamin e (FISH OIL) 1,000 mg Cap, Take  by mouth daily. , Disp: , Rfl:         Allergies   Allergen Reactions    Ace Inhibitors Cough    Pcn [Penicillins] Anaphylaxis and Hives     Just got Rocephin in ED, no reactions    Niaspan [Niacin] Unable to Obtain      MRI Results (most recent):  No results found for this or any previous visit. No results found for this or any previous visit. Review of Systems:  A comprehensive review of systems was negative except for: Neurological: positive for paresthesia, coordination problems and weakness   Vitals:    04/22/20 2241 04/23/20 0418 04/23/20 0742 04/23/20 1137   BP: (!) 141/91 120/70 119/85 128/77   Pulse: 77 61 64 60   Resp: 18 18 18 18   Temp:  98.2 °F (36.8 °C) 98.3 °F (36.8 °C) 97.6 °F (36.4 °C)   SpO2: 100% 94% 99% 98%   Weight:       Height:         Objective:     I      NEUROLOGICAL EXAM:    Appearance: The patient is well developed, well nourished, provides a coherent history and is in no acute distress. Mental Status: Oriented to time, place and person, and the president, cognitive function is normal and speech is fluent and no aphasia or dysarthria. Mood and affect appropriate.    Cranial Nerves: Intact visual fields. Fundi are benign, disc are flat, no lesions seen on funduscopy. JALEN, EOM's full, no nystagmus, no ptosis. Facial sensation is normal. Corneal reflexes are not tested. Facial movement is symmetric. Hearing is normal bilaterally. Palate is midline with normal sternocleidomastoid and trapezius muscles are normal. Tongue is midline. Neck without meningismus or bruits  Temporal arteries are not tender or enlarged  TMJ areas are not tender on palpation   Motor:  5/5 strength in upper and lower proximal and distal muscles. Normal bulk and tone. No fasciculations. Rapid alternating movement is symmetric and intact bilaterally   Reflexes:   Deep tendon reflexes 2+/4 and symmetrical.  No babinski or clonus present   Sensory:   Normal to touch, pinprick and vibration and temperature. DSS is intact   Gait:  Not tested. Tremor:   No tremor noted. Cerebellar:  No abnormal cerebellar signs present on Romberg and tandem testing and finger-nose-finger exam.   Neurovascular:  Normal heart sounds and regular rhythm, peripheral pulses intact, and no carotid bruits. Assessment:       ICD-10-CM ICD-9-CM    1. Word finding difficulty R47.89 V40.1    2. Numbness and tingling in right hand R20.0 782.0     R20.2     3. Bilateral carotid artery stenosis I65.23 433.10      433.30    4. TIA (transient ischemic attack) G45.9 435.9    5. Transient ischemic attack involving left internal carotid artery G45.1 435.8      Active Problems:    Paroxysmal supraventricular tachycardia (HCC) ()      Chronic ischemic heart disease (3/20/2012)      TIA (transient ischemic attack) (4/22/2020)      Bilateral carotid artery stenosis (4/22/2020)      Transient ischemic attack involving left internal carotid artery (4/22/2020)        Plan:     Patient with clear TIA, but negative studies for cerebrovascular disease, and cannot get an MRI scan because her pacemaker is MRI incompatible.   The hospitalist wants to anticoagulate I can argue against that, so patient will go home on Eliquis, and we will skipped a CAROLIN because remains anticoagulated anyway. Cardiology interrogated the pacemaker and found no evidence of A. fib,  Complete neurovascular work-up ordered, we will follow in the office in stroke clinic in 2 to 4 weeks.   .  Signed By: Patricia Macdonald MD     April 23, 2020       CC: Lane Hummel MD  FAX: [phone]

## 2020-04-23 NOTE — PROGRESS NOTES
Bedside and Verbal shift change report given to Jhony (oncoming nurse) by Kerrie Castro (offgoing nurse). Report included the following information SBAR, Kardex, Procedure Summary, MAR, Recent Results and Cardiac Rhythm paced rhythm.

## 2020-04-23 NOTE — DISCHARGE SUMMARY
Hospitalist Discharge Summary     Patient ID:  Amanda Jung  [id number]  29 y.o.  1945  4/21/2020    PCP on record: Diana Calvert MD    Admit date: 4/21/2020  Discharge date and time: 4/23/2020    DISCHARGE DIAGNOSIS:    TIA:    HTN:  Paroxysmal afib:   CAD s/p CABG  Dyslipidemia    CONSULTATIONS:  IP CONSULT TO HOSPITALIST  IP CONSULT TO NEUROLOGY  IP CONSULT TO CARDIOLOGY    Excerpted HPI from H&P of Karla Jimenez MD:    75 y.o lady who presents with R hand weakness. Symptoms began around 10:30 PM last night. She had sudden-onset right hand weakness. It gradually resolved over the next hour, although her hand still feels slightly weak. She denies associated paresthesias, gait changes, speech difficulty. Headache, vision changes. ER evaluation noted some transient aphasia  ______________________________________________________________________  DISCHARGE SUMMARY/HOSPITAL COURSE:  for full details see H&P, daily progress notes, labs, consult notes. TIA:   -symptoms resolved  -Pacemaker reviewed no afib noticed  -Provided History of PAF and TIA I had discussion with cardiology and agreed upon starting anticoagulation  -Started on Eliquis 5mg BID  -Change Aspirin to 81mg daily  -C/W Statin, LDL 83, HDL 57  -HbA1c is 5.8  -Echo showed preserved EF  -CTA and CT head unremarkable  -MRI could not be performed due to pacemaker incompatibility    HTN:  -continue sotalol, Losartan and HTCZ     Paroxysmal afib:   -on sotalol  -Started Eliquis     Runs of VT noted on tele:  - K/Mg wnl  -Pacemaker interrogation unremarkable  -continue sotalol     CAD s/p CABG        _______________________________________________________________________  Patient seen and examined by me on discharge day. Pertinent Findings:  Gen:    Not in distress  Chest: Clear lungs  CVS:   Regular rhythm.   No edema  Abd:  Soft, not distended, not tender  Neuro:  Alert, oriented x4  _______________________________________________________________________  DISCHARGE MEDICATIONS:   Current Discharge Medication List      START taking these medications    Details   aspirin delayed-release 81 mg tablet Take 1 Tab by mouth daily. Qty: 30 Tab, Refills: 2      atorvastatin (LIPITOR) 40 mg tablet Take 1 Tab by mouth daily. Qty: 30 Tab, Refills: 2      apixaban (ELIQUIS) 5 mg tablet Take 1 Tab by mouth two (2) times a day. Qty: 60 Tab, Refills: 2         CONTINUE these medications which have NOT CHANGED    Details   losartan-hydroCHLOROthiazide (HYZAAR) 50-12.5 mg per tablet Take 2 Tabs by mouth daily. Qty: 60 Tab, Refills: 5    Associated Diagnoses: Essential hypertension      sotalol (BETAPACE) 80 mg tablet Take 1 Tab by mouth two (2) times a day. Qty: 60 Tab, Refills: 11      PSYLLIUM HUSK (DAILY FIBER PO) Take  by mouth daily. FOLIC ACID/MULTIVIT-MIN/LUTEIN (CENTRUM SILVER PO) Take  by mouth daily. omega-3 fatty acids-vitamin e (FISH OIL) 1,000 mg Cap Take  by mouth daily. STOP taking these medications       simvastatin (ZOCOR) 40 mg tablet Comments:   Reason for Stopping:         aspirin (ASPIRIN) 325 mg tablet Comments:   Reason for Stopping:                 Patient Follow Up Instructions:    Activity: Activity as tolerated  Diet: Cardiac Diet  Wound Care: None needed    Follow-up with PCP and Cardiology in 1-4 weeks    Follow-up tests/labs : None  Follow-up Information     Follow up With Specialties Details Why Contact Info    Kan Guadalupe MD [facility] In 1 week  [address] [place] [address]  [phone]      Aayush Valenzuela MD Cardiology In 4 weeks  [address]  [phone]          ________________________________________________________________    Risk of deterioration: Moderate    Condition at Discharge:  Stable  __________________________________________________________________    Disposition  Home with family, no needs    ____________________________________________________________________    Code Status: Full Code  ___________________________________________________________________      Total time in minutes spent coordinating this discharge (includes going over instructions, follow-up, prescriptions, and preparing report for sign off to her PCP) :  38 minutes    Signed:  Shelly Killian MD

## 2020-04-23 NOTE — DISCHARGE INSTRUCTIONS
HOSPITALIST DISCHARGE INSTRUCTIONS    NAME: Patel Alvarez   :  1945   MRN:  [id number]     Date/Time:  2020 1:59 PM    ADMIT DATE: 2020     DISCHARGE DATE: 2020     DISCHARGE DIAGNOSIS:  TIA  Paroxysmal Afib  HTN    MEDICATIONS:  · It is important that you take the medication exactly as they are prescribed. · Keep your medication in the bottles provided by the pharmacist and keep a list of the medication names, dosages, and times to be taken in your wallet. · Do not take other medications without consulting your doctor. Pain Management: per above medications    What to do at Home    Recommended diet:  Cardiac Diet    Recommended activity: Activity as tolerated    If you have questions regarding the hospital related prescriptions or hospital related issues please [facility] [phone]. If you experience any of the following symptoms then please call your primary care physician or return to the emergency room if you cannot get hold of your doctor:  Fever, chills, nausea, vomiting, diarrhea, change in mentation, falling, bleeding, shortness of breath,     Follow Up:  Dr. Emmanuel Oleary MD  you are to call and set up an appointment to see them in 7-10 days. Information obtained by :  I understand that if any problems occur once I am at home I am to contact my physician. I understand and acknowledge receipt of the instructions indicated above.                                                                                                                                            Physician's or R.N.'s Signature                                                                  Date/Time                                                                                                                                              Patient or Representative Signature                                                          Date/Time

## 2020-04-23 NOTE — PROGRESS NOTES
[address] [facility] [address] [facility]  [phone]      Cardiology Progress Note      4/23/2020 1145 AM     Admit Date: 4/21/2020    Admit Diagnosis:   TIA (transient ischemic attack) [G45.9]    Interval History/Subjective:     Susy Bal is a 75 y.o. female with PMH SSS s/p pacemaker, CABG, PAF, HLD, HTN, PSVT who was admitted for TIA (transient ischemic attack) [G45.9]. .    -VSS  -labs steady  -interrogation yesterday showed no events  -Ms. Jimmy Mcguire is waiting for CAROLIN. She denies any current complaints. Feels back to normal.     Visit Vitals  /77   Pulse 60   Temp 97.6 °F (36.4 °C)   Resp 18   Ht 5' 2\" (1.575 m)   Wt 88.5 kg (195 lb 1.7 oz)   LMP 08/24/1993   SpO2 98%   BMI 35.69 kg/m²       Current Facility-Administered Medications   Medication Dose Route Frequency    acetaminophen (TYLENOL) tablet 650 mg  650 mg Oral Q4H PRN    Or    acetaminophen (TYLENOL) solution 650 mg  650 mg Per NG tube Q4H PRN    Or    acetaminophen (TYLENOL) suppository 650 mg  650 mg Rectal Q4H PRN    ondansetron (ZOFRAN) injection 4 mg  4 mg IntraVENous Q6H PRN    labetaloL (NORMODYNE;TRANDATE) injection 5 mg  5 mg IntraVENous Q10MIN PRN    heparin (porcine) injection 5,000 Units  5,000 Units SubCUTAneous Q8H    sotaloL (BETAPACE) tablet 80 mg  80 mg Oral BID    aspirin tablet 325 mg  325 mg Oral DAILY    atorvastatin (LIPITOR) tablet 40 mg  40 mg Oral DAILY       Objective:      Physical Exam:  General: pleasant, elderly, AAF sitting in chair in NAD   Heart: RRR, no m/S3/JVD  Lungs: clear   Abdomen: Soft, +BS, NTND   Extremities: LE stan +DP/PT, no edema   Neurologic: Grossly normal  Skin:  Warm and dry.      Data Review:   Recent Labs     04/22/20  0010   WBC 3.6   HGB 12.6   HCT 39.0        Recent Labs     04/23/20  0400 04/22/20  0010    138   K 3.7 3.9    104   CO2 25 28   GLU 80 93   BUN 19 27*   CREA 0.78 1.19*   CA 8.7 9.0   MG 2.3 2.1   ALB  --  4.0   TBILI  --  0.5 SGOT  --  27   ALT  --  26   INR  --  1.0       No results for input(s): TROIQ, CPK, CKMB in the last 72 hours. Intake/Output Summary (Last 24 hours) at 4/23/2020 1147  Last data filed at 4/22/2020 1837  Gross per 24 hour   Intake 120 ml   Output    Net 120 ml        Telemetry: some A pacing with occasional V pacing   ECG: SR with 1st degree AVB  Echocardiogram: EF 55-60%; mod dilated LA; mild MR; mild pHTN. CXRAY:n/a    Assessment:     Active Problems:    Paroxysmal supraventricular tachycardia (HCC) ()      Chronic ischemic heart disease (3/20/2012)      TIA (transient ischemic attack) (4/22/2020)      Bilateral carotid artery stenosis (4/22/2020)      Transient ischemic attack involving left internal carotid artery (4/22/2020)        Plan:     PAF history:  Presented with TIA symptoms. No a fib on tele. Rhythm pacing. · Pacemaker interrogation without a fib events  · CAROLIN today to check for thrombus       CAD history:  Stable  · Continue ASA, statin, BB        TIA: symptoms fully resolved. CT negative. Unable to get MRI due to pacemaker  · Per neuro and hospitalist       Celeste Jones NP  DNP, RN, AGACNP-BC      [address] [facility] Cardiology    4/23/2020         Patient seen, examined by me personally. Plan discussed as detailed. Agree with note as outlined by  NP. I confirm findings in history and physical exam. No additional findings noted. Agree with plan as outlined above.      Jacki Barfield MD

## 2020-04-24 ENCOUNTER — PATIENT OUTREACH (OUTPATIENT)
Dept: INTERNAL MEDICINE CLINIC | Age: 75
End: 2020-04-24

## 2020-04-24 NOTE — PROGRESS NOTES
Patient contacted regarding recent discharge and COVID-19 risk   Care Transition Nurse/ Ambulatory Care Manager contacted the patient by telephone to perform post discharge assessment. Verified name and  with patient as identifiers. Patient has following risk factors of: recent hospitalization, TIA, obesity, stenosis. CTN/ACM reviewed discharge instructions, medical action plan and red flags related to discharge diagnosis. Reviewed and educated them on any new and changed medications related to discharge diagnosis. Advised obtaining a 90-day supply of all daily and as-needed medications. Education provided regarding infection prevention, and signs and symptoms of COVID-19 and when to seek medical attention with patient who verbalized understanding. Discussed exposure protocols and quarantine from [address] you at higher risk for severe illness  and given an opportunity for questions and concerns. The patient agrees to contact the COVID-19 hotline [phone] or PCP office for questions related to their healthcare. CTN/ACM provided contact information for future reference. From CDC: Are you at higher risk for severe illness?  Wash your hands often.  Avoid close contact (6 feet, which is about two arm lengths) with people who are sick.  Put distance between yourself and other people if COVID-19 is spreading in your community.  Clean and disinfect frequently touched surfaces.  Avoid all cruise travel and non-essential air travel.  Call your healthcare professional if you have concerns about COVID-19 and your underlying condition or if you are sick. For more information on steps you can take to protect yourself, see CDC's How to Protect Yourself      Patient/family/caregiver given information for Dionne Beck and agrees to enroll yes  Patient's preferred e-mail:  [email or web address]  Patient's preferred phone number: [phone]  Based on Loop alert triggers, patient will be contacted by nurse care manager for worsening symptoms. Pt will be further monitored by COVID Loop Team based on severity of symptoms and risk factors. - patient has f/u with PCP on 4/29/2020  -  Reviewed FAST with patient and signs of CVA, such as sudden numbness, tingling, loss of movement in your face, arm, leg, especially on one side of the body, vision changes, trouble speaking, confusion, walking or balance problems and sudden headache. START taking these medications     Details   aspirin delayed-release 81 mg tablet Take 1 Tab by mouth daily. Qty: 30 Tab, Refills: 2       atorvastatin (LIPITOR) 40 mg tablet Take 1 Tab by mouth daily. Qty: 30 Tab, Refills: 2       apixaban (ELIQUIS) 5 mg tablet Take 1 Tab by mouth two (2) times a day.   Qty: 60 Tab, Refills: 2     STOP taking these medications         simvastatin (ZOCOR) 40 mg tablet Comments:   Reason for Stopping:            aspirin (ASPIRIN) 325 mg tablet Comments:   Reason for Stopping:

## 2020-04-25 LAB
BACTERIA SPEC CULT: ABNORMAL
BACTERIA SPEC CULT: ABNORMAL
CC UR VC: ABNORMAL
SERVICE CMNT-IMP: ABNORMAL

## 2020-04-29 ENCOUNTER — VIRTUAL VISIT (OUTPATIENT)
Dept: FAMILY MEDICINE CLINIC | Age: 75
End: 2020-04-29

## 2020-04-29 VITALS — DIASTOLIC BLOOD PRESSURE: 77 MMHG | SYSTOLIC BLOOD PRESSURE: 124 MMHG | TEMPERATURE: 97 F

## 2020-04-29 DIAGNOSIS — Z09 HOSPITAL DISCHARGE FOLLOW-UP: Primary | ICD-10-CM

## 2020-04-29 DIAGNOSIS — E78.2 MIXED HYPERLIPIDEMIA: ICD-10-CM

## 2020-04-29 DIAGNOSIS — I48.0 PAF (PAROXYSMAL ATRIAL FIBRILLATION) (HCC): ICD-10-CM

## 2020-04-29 DIAGNOSIS — I25.810 CORONARY ARTERY DISEASE INVOLVING CORONARY BYPASS GRAFT OF NATIVE HEART WITHOUT ANGINA PECTORIS: ICD-10-CM

## 2020-04-29 DIAGNOSIS — G45.9 TIA (TRANSIENT ISCHEMIC ATTACK): ICD-10-CM

## 2020-04-29 DIAGNOSIS — I10 ESSENTIAL HYPERTENSION: ICD-10-CM

## 2020-04-29 DIAGNOSIS — I25.9 CHRONIC ISCHEMIC HEART DISEASE: ICD-10-CM

## 2020-04-29 DIAGNOSIS — Z95.1 S/P CABG X 4: ICD-10-CM

## 2020-04-29 DIAGNOSIS — Z95.0 PACEMAKER: ICD-10-CM

## 2020-04-29 NOTE — PROGRESS NOTES
Kristine Barragan is a 75 y.o. female who was seen by synchronous (real-time) audio-video technology on 4/29/2020. Consent: Kristine Barragan, who was seen by synchronous (real-time) audio-video technology, and/or her healthcare decision maker, is aware that this patient-initiated, Telehealth encounter on 4/29/2020 is a billable service, with coverage as determined by her insurance carrier. She is aware that she may receive a bill and has provided verbal consent to proceed: Yes. Assessment & Plan:   Diagnoses and all orders for this visit:    1. Hospital discharge follow-up  2. TIA (transient ischemic attack)  - sx resolved, ct Eliquis, ASA, and statin. Has Neuro and Cardio f/u.    3. Essential hypertension - ct to be well controlled    4. Chronic ischemic heart disease  5. Mixed hyperlipidemia  6. Coronary artery disease involving coronary bypass graft of native heart without angina pectoris  7. S/P CABG x 4  8. PAF (paroxysmal atrial fibrillation) ([facility] [address]  9. Pacemaker  - ct current meds, following with Cardiology      Follow-up and Dispositions    · Return in about 6 weeks (around 6/10/2020). Subjective:   Kristine Barragan is a 75 y.o. female who was seen for Hospital Follow Up (Discharge ED [facility] 4/23/20)    Hospital discharge f/u. Admitted for TIA from 4/21/2020-4/23/2020. Had some right thumb numbess and weakness. Had full resolution of sx and doing well now. Seen by Neuro and Cardio during hospitalization too. Had nml CTA head and neck and nml CT head. Pacemaker interrogated with no concerns. Sent home on Eliquis, ASA 81 (down from 325), and ct on Lipitor. Had TTE instead of CAROLIN but no concerns with thrombus on TTE. Has cardiology and neuro f/u soon.     Hospitalist notes reviewed:  \"TIA:   -symptoms resolved  -Pacemaker reviewed no afib noticed  -Provided History of PAF and TIA I had discussion with cardiology and agreed upon starting anticoagulation  -Started on Eliquis 5mg BID  -Change Aspirin to 81mg daily  -C/W Statin, LDL 83, HDL 57  -HbA1c is 5.8  -Echo showed preserved EF  -CTA and CT head unremarkable  -MRI could not be performed due to pacemaker incompatibility     HTN:  -continue sotalol, Losartan and HTCZ     Paroxysmal afib:   -on sotalol  -Started Eliquis     Runs of VT noted on tele:  - K/Mg wnl  -Pacemaker interrogation unremarkable  -continue sotalol     CAD s/p CABG\"    Prior to Admission medications    Medication Sig Start Date End Date Taking? Authorizing Provider   aspirin delayed-release 81 mg tablet Take 1 Tab by mouth daily. 4/24/20  Yes Letty Brooks MD   atorvastatin (LIPITOR) 40 mg tablet Take 1 Tab by mouth daily. 4/24/20  Yes Letty Brooks MD   apixaban (ELIQUIS) 5 mg tablet Take 1 Tab by mouth two (2) times a day. 4/23/20  Yes Letty Brooks MD   losartan-hydroCHLOROthiazide [facility]) 50-12.5 mg per tablet Take 2 Tabs by mouth daily. 1/14/20  Yes Alexandra Ramirez NP   sotalol (BETAPACE) 80 mg tablet Take 1 Tab by mouth two (2) times a day. 8/15/19  Yes Ange Simmons MD   PSYLLIUM HUSK (DAILY FIBER PO) Take  by mouth daily. Yes Provider, Historical   FOLIC ACID/MULTIVIT-MIN/LUTEIN (CENTRUM SILVER PO) Take  by mouth daily. Yes Provider, Historical   omega-3 fatty acids-vitamin e (FISH OIL) 1,000 mg Cap Take  by mouth daily.    Yes Provider, Historical     Allergies   Allergen Reactions    Ace Inhibitors Cough    Pcn [Penicillins] Anaphylaxis and Hives     Just got Rocephin in ED, no reactions    Niaspan [Niacin] Unable to Obtain           ROS    Objective:   Vital Signs: (As obtained by patient/caregiver at home)  Visit Vitals  /77   Temp 97 °F (36.1 °C) (Oral)   LMP 08/24/1993        Physical exam:  General appearance - alert, well appearing, and in no distress  Eyes -sclera anicteric, no discharge  HEENT normocephalic, atraumatic, moist mucous membranes, no visualized neck mass  Chest -normal respiratory effort, no visualized signs of respiratory distress  Neurological - alert, awake, normal speech, no focal findings or movement disorder noted  Psych - normal mood and affect  Skin no apparent lesions      We discussed the expected course, resolution and complications of the diagnosis(es) in detail. Medication risks, benefits, costs, interactions, and alternatives were discussed as indicated. I advised her to contact the office if her condition worsens, changes or fails to improve as anticipated. She expressed understanding with the diagnosis(es) and plan. Casandra Zavala is a 75 y.o. female who was evaluated by a video visit encounter for concerns as above. Patient identification was verified prior to start of the visit. A caregiver was present when appropriate. Due to this being a TeleHealth encounter (During [place] [address] public health emergency), evaluation of the following organ systems was limited: Vitals/Constitutional/EENT/Resp/CV/GI//MS/Neuro/Skin/Heme-Lymph-Imm. Pursuant to the emergency declaration under the [facility], 1135 waiver authority and the [employer] and [employer] Act, this Virtual  Visit was conducted, with patient's (and/or legal guardian's) consent, to reduce the patient's risk of exposure to COVID-19 and provide necessary medical care. Services were provided through a video synchronous discussion virtually to substitute for in-person clinic visit. Patient and provider were located at their individual homes.       Zohaib Crews MD

## 2020-04-29 NOTE — LETTER
5/11/2020 4:30 PM 
 
Ms. Berry [phone] Dear Ms. Goncalves: 
 
 Please call our office at [phone] and schedule a follow up appointment for your continued care. Please call and schedule 6 week follow-up appointment with Dr Kristina Jorge.  
 
 
Sincerely, 
 
 
Jairo Dillon MD

## 2020-05-14 ENCOUNTER — OFFICE VISIT (OUTPATIENT)
Dept: CARDIOLOGY CLINIC | Age: 75
End: 2020-05-14

## 2020-05-14 DIAGNOSIS — Z95.0 CARDIAC PACEMAKER IN SITU: Primary | ICD-10-CM

## 2020-05-14 DIAGNOSIS — I49.5 SSS (SICK SINUS SYNDROME) (HCC): ICD-10-CM

## 2020-06-02 ENCOUNTER — VIRTUAL VISIT (OUTPATIENT)
Dept: NEUROLOGY | Age: 75
End: 2020-06-02

## 2020-06-02 VITALS — WEIGHT: 187 LBS | BODY MASS INDEX: 33.13 KG/M2 | RESPIRATION RATE: 16 BRPM | HEIGHT: 63 IN

## 2020-06-02 DIAGNOSIS — I48.0 PAF (PAROXYSMAL ATRIAL FIBRILLATION) (HCC): ICD-10-CM

## 2020-06-02 DIAGNOSIS — G45.1 TRANSIENT ISCHEMIC ATTACK INVOLVING LEFT INTERNAL CAROTID ARTERY: ICD-10-CM

## 2020-06-02 DIAGNOSIS — I65.23 BILATERAL CAROTID ARTERY STENOSIS: Primary | ICD-10-CM

## 2020-06-02 NOTE — PROGRESS NOTES
Consult  REFERRED BY:  Tianna Baum MD    CHIEF COMPLAINT: Right hand weakness and numbness      Subjective:     Susy Bal is a 75 y.o. right-handed -American female we are seeing today for evaluation of sudden episode of right hand numbness and weakness that occurred for 2 hours on April 22, 2020 when she was trying to buckle her seatbelt could not feel or use her right hand correctly and had to use her left hand but denied any other focal neurological symptoms. She denies any chest pain palpitations or cardiac symptoms, denied any facial involvement denied any leg involvement denied any headache or vision changes or speech trouble. Her CTA of the head and neck was normal and her CT of the head was normal except for minimal white matter disease. Patient was on 325 mg of aspirin prior to admission and now is on the same medication plus Lipitor. She has a history of paroxysmal atrial fibrillation. She has an MRI incompatible pacemaker. Patient had a negative repeat CT scan and cardiologist and hospitalist feels that the patient should be started on Eliquis and has done that, and cardiology feels that there is no reason for CAROLIN. Patient doing well today, no further neurologic deficits or recurrent TIAs since this event on Eliquis, and no bleeding complications or side effects of the medicine so far. .    Past Medical History:   Diagnosis Date    Acquired cyst of kidney     CAD (coronary artery disease)     Cervical disc herniation     Essential hypertension     Hypercholesterolemia     Hypertension     PAF [facility]oxysmal [facility] fibrillation[facility]) 6/9/2016    [facility]ox[facility]ricular tachycardia ([facility] [address]     Postsurgical aortocoronary bypass status 3/20/2012    S/P cardiac pacemaker procedure 6/9/2016 6/9/16 [employer] dual chamber pacemaker implant    Urge incontinence       Past Surgical History:   Procedure Laterality Date    CABG, ARTERY-VEIN, FOUR  2007    COLONOSCOPY N/A 2018    [facility] ENDOSCOPY    ENDOSCOPY, COLON, DIAGNOSTIC      due  hx proctitis    HX CERVICAL DISKECTOMY      HX CORONARY ARTERY BYPASS GRAFT      HX HEENT      Thyroidectomy    HX ORTHOPAEDIC      benign thumb tumor    HX PACEMAKER Left 2016    HX UROLOGICAL  2011    stent for hydronephrosis     Family History   Problem Relation Age of Onset    Asthma Mother     Hypertension Father     Stroke Father     Alcohol abuse Father     Cancer Sister         breast    Diabetes Brother     Heart Disease Sister     Heart Disease Brother       Social History     Tobacco Use    Smoking status: Former Smoker     Packs/day: 0.50     Years: 10.00     Pack years: 5.00     Types: Cigarettes     Last attempt to quit: 2007     Years since quittin.9    Smokeless tobacco: Never Used   Substance Use Topics    Alcohol use: No     Alcohol/week: 0.0 standard drinks         Current Outpatient Medications:     aspirin delayed-release 81 mg tablet, Take 1 Tab by mouth daily. , Disp: 30 Tab, Rfl: 2    atorvastatin (LIPITOR) 40 mg tablet, Take 1 Tab by mouth daily. , Disp: 30 Tab, Rfl: 2    apixaban (ELIQUIS) 5 mg tablet, Take 1 Tab by mouth two (2) times a day., Disp: 60 Tab, Rfl: 2    losartan-hydroCHLOROthiazide (HYZAAR) 50-12.5 mg per tablet, Take 2 Tabs by mouth daily. , Disp: 60 Tab, Rfl: 5    sotalol (BETAPACE) 80 mg tablet, Take 1 Tab by mouth two (2) times a day., Disp: 60 Tab, Rfl: 11    PSYLLIUM HUSK (DAILY FIBER PO), Take  by mouth daily. , Disp: , Rfl:     FOLIC ACID/MULTIVIT-MIN/LUTEIN (CENTRUM SILVER PO), Take  by mouth daily. , Disp: , Rfl:     omega-3 fatty acids-vitamin e (FISH OIL) 1,000 mg Cap, Take  by mouth daily. , Disp: , Rfl:         Allergies   Allergen Reactions    Ace Inhibitors Cough    Pcn [Penicillins] Anaphylaxis and Hives     Just got Rocephin in ED, no reactions    Niaspan [Niacin] Unable to Obtain      MRI Results (most recent):  No results found for this or any previous visit. No results found for this or any previous visit. Review of Systems:  A comprehensive review of systems was negative except for: Neurological: positive for paresthesia, coordination problems and weakness   Vitals:    06/02/20 0940   Resp: 16   Weight: 187 lb (84.8 kg)   Height: 5' 3\" (1.6 m)     Objective:     I      NEUROLOGICAL EXAM:    Appearance: The patient is well developed, well nourished, provides a coherent history and is in no acute distress. Mental Status: Oriented to time, place and person, and the president, cognitive function is normal and speech is fluent and no aphasia or dysarthria. Mood and affect appropriate. Cranial Nerves:   Intact visual fields. Fundi are not testable. JALEN, EOM's full, no nystagmus, no ptosis. Facial sensation is normal. Corneal reflexes are not tested. Facial movement is symmetric. Hearing is normal bilaterally. Palate is midline with normal sternocleidomastoid and trapezius muscles are normal. Tongue is midline. Neck without meningismus or bruits  Temporal arteries are not tender or enlarged  TMJ areas are not tender on palpation   Motor:  5/5 strength in upper and lower proximal and distal muscles. Normal bulk and tone. No fasciculations. Rapid alternating movement is symmetric and intact bilaterally   Reflexes:   Deep tendon reflexes and Babinski and clonus are not testable   Sensory:   Normal to touch, pinprick and vibration and temperature and  DSS are not testable   Gait:  Normal gait for age. Tremor:   No tremor noted. Cerebellar:  No abnormal cerebellar signs present on Romberg and tandem testing and finger-nose-finger exam.   Neurovascular:   Cardiac exam and carotid artery exam and peripheral artery exam are not testable           Assessment:       ICD-10-CM ICD-9-CM    1. Bilateral carotid artery stenosis I65.23 433.10      433.30    2.  Transient ischemic attack involving left internal carotid artery G45.1 435.8    3. PAF (paroxysmal atrial fibrillation) ([facility]) I48.0 427.31      Active Problems:    * No active hospital problems. *      Plan:     Patient with clear TIA, but negative studies for cerebrovascular disease, and cannot get an MRI scan because her pacemaker is MRI incompatible. Patient had normal CTA of the head neck, and normal repeat CT scan showing no evidence of new stroke. Hospitalist and cardiology and I all agree the patient needs anticoagulation and she has been started on that and doing well had no recurrent symptoms on 5 mg of Eliquis twice a day. She has not had any bleeding complications or any side effect the medication all and is doing well. We will see her again in 1 years time to repeat her carotid Doppler, she will call immediately if any further neurologic problems  Complete neurovascular work-up ordered, we will follow in the office in stroke clinic in 1 year if she is stable but she will call immediately with any problem. .  . Signed By: Ryan Hardin MD     June 2, 2020       CC: Nithin Farrar MD  FAX: [phone]     Robin Kaplan was seen by synchronous (real-time) audio-video technology on 06/02/20. Consent:  She and/or her healthcare decision maker is aware that this patient-initiated Telehealth encounter is a billable service, with coverage as determined by her insurance carrier. She is aware that she may receive a bill and has provided verbal consent to proceed: Yes    I was in the office while conducting this encounter. Pursuant to the emergency declaration under the [facility], 1135 waiver authority and the [employer] and [employer] Act, this Virtual  Visit was conducted, with patient's consent, to reduce the patient's risk of exposure to COVID-19 and provide continuity of care for an established patient.      Services were provided through a video synchronous discussion virtually to substitute for in-person clinic visit. This note will not be viewable in [address]

## 2020-06-02 NOTE — PATIENT INSTRUCTIONS
A Healthy Lifestyle: Care Instructions Your Care Instructions A healthy lifestyle can help you feel good, stay at a healthy weight, and have plenty of energy for both work and play. A healthy lifestyle is something you can share with your whole family. A healthy lifestyle also can lower your risk for serious health problems, such as high blood pressure, heart disease, and diabetes. You can follow a few steps listed below to improve your health and the health of your family. Follow-up care is a key part of your treatment and safety. Be sure to make and go to all appointments, and call your doctor if you are having problems. It's also a good idea to know your test results and keep a list of the medicines you take. How can you care for yourself at home? · Do not eat too much sugar, fat, or fast foods. You can still have dessert and treats now and then. The goal is moderation. · Start small to improve your eating habits. Pay attention to portion sizes, drink less juice and soda pop, and eat more fruits and vegetables. ? Eat a healthy amount of food. A 3-ounce serving of meat, for example, is about the size of a deck of cards. Fill the rest of your plate with vegetables and whole grains. ? Limit the amount of soda and sports drinks you have every day. Drink more water when you are thirsty. ? Eat at least 5 servings of fruits and vegetables every day. It may seem like a lot, but it is not hard to reach this goal. A serving or helping is 1 piece of fruit, 1 cup of vegetables, or 2 cups of leafy, raw vegetables. Have an apple or some carrot sticks as an afternoon snack instead of a candy bar. Try to have fruits and/or vegetables at every meal. 
· Make exercise part of your daily routine. You may want to start with simple activities, such as walking, bicycling, or slow swimming. Try to be active 30 to 60 minutes every day.  You do not need to do all 30 to 60 minutes all at once. For example, you can exercise 3 times a day for 10 or 20 minutes. Moderate exercise is safe for most people, but it is always a good idea to talk to your doctor before starting an exercise program. 
· Keep moving. José Luis Lozanoow the lawn, work in the garden, or [employer]. Take the stairs instead of the elevator at work. · If you smoke, quit. People who smoke have an increased risk for heart attack, stroke, cancer, and other lung illnesses. Quitting is hard, but there are ways to boost your chance of quitting tobacco for good. ? Use nicotine gum, patches, or lozenges. ? Ask your doctor about stop-smoking programs and medicines. ? Keep trying. In addition to reducing your risk of diseases in the future, you will notice some benefits soon after you stop using tobacco. If you have shortness of breath or asthma symptoms, they will likely get better within a few weeks after you quit. · Limit how much alcohol you drink. Moderate amounts of alcohol (up to 2 drinks a day for men, 1 drink a day for women) are okay. But drinking too much can lead to liver problems, high blood pressure, and other health problems. Family health If you have a family, there are many things you can do together to improve your health. · Eat meals together as a family as often as possible. · Eat healthy foods. This includes fruits, vegetables, lean meats and dairy, and whole grains. · Include your family in your fitness plan. Most people think of activities such as jogging or tennis as the way to fitness, but there are many ways you and your family can be more active. Anything that makes you breathe hard and gets your heart pumping is exercise. Here are some tips: 
? Walk to do errands or to take your child to school or the bus. 
? Go for a family bike ride after dinner instead of watching TV. Where can you learn more? Go to http://breanna-tayo.info/ Enter H685 in the search box to learn more about \"A Healthy Lifestyle: Care Instructions. \" Current as of: January 31, 2020               Content Version: 12.5 © 7486-3532 Healthwise, [employer]. Care instructions adapted under license by [employer] (which disclaims liability or warranty for this information). If you have questions about a medical condition or this instruction, always ask your healthcare professional. [address] any warranty or liability for your use of this information. Office Policies 
 
o Phone calls/patient messages: Please allow up to 24 hours for someone in the office to contact you about your call or message. Be mindful your provider may be out of the office or your message may require further review. We encourage you to use [employer] for your messages as this is a faster, more efficient way to communicate with our office 
 
o Medication Refills: 
Prescription medications require up to 48 business hours to process. We encourage you to use [employer] for your refills. For controlled medications: Please allow up to 72 business hours to process. Certain medications may require you to  a written prescription at our office. NO narcotic/controlled medications will be prescribed after 4pm Monday through Friday or on weekends 
 
o Form/Paperwork Completion: We ask that you allow 7-14 business days. You may also download your forms to [employer] to have your doctor print off.

## 2020-06-18 ENCOUNTER — TELEPHONE (OUTPATIENT)
Dept: FAMILY MEDICINE CLINIC | Age: 75
End: 2020-06-18

## 2020-06-18 NOTE — TELEPHONE ENCOUNTER
----- Message from Tena Gonzalez sent at 6/18/2020 11:49 AM EDT -----  Regarding: Dr. Bismark Ham: [phone]  Caller's first and last name and relationship (if not the patient): n/a  Best contact number(s): [phone]  Whose call is being returned: n/a  Details to clarify the request: She wasn't sure who called or what the phone call was for. She advised that she works at [employer] today 6/18 and would like someone to called her before then.

## 2020-06-19 ENCOUNTER — VIRTUAL VISIT (OUTPATIENT)
Dept: FAMILY MEDICINE CLINIC | Age: 75
End: 2020-06-19

## 2020-06-19 DIAGNOSIS — Z71.89 ADVANCED DIRECTIVES, COUNSELING/DISCUSSION: ICD-10-CM

## 2020-06-19 DIAGNOSIS — Z00.00 MEDICARE ANNUAL WELLNESS VISIT, SUBSEQUENT: Primary | ICD-10-CM

## 2020-06-19 NOTE — ACP (ADVANCE CARE PLANNING)
Advance Care Planning       Advance Care Planning (ACP) Physician/NP/PA (Provider) Rhianna Call        Date of ACP Conversation: 6/19/2020    Conversation Conducted with:   Patient with Decision Making [place]: If patient is incapacitated, her decision maker is her daughter, Stacie Landa. Care Preferences:    Hospitalization: \"If your health worsens and it becomes clear that your chance of recovery is unlikely, what would your preference be regarding hospitalization? \"  If the patient would want hospitalization, answer \"yes\". If the patient would prefer comfort-focused treatment without hospitalization, answer \"no\". no      Ventilation: \"If you were in your present state of health and suddenly became very ill and were unable to breathe on your own, what would your preference be about the use of a ventilator (breathing machine) if it was available to you? \"    If patient would desire the use of a ventilator (breathing machine), answer \"yes\", if not answer \"no\":yes    \"If your health worsens and it becomes clear that your chance of recovery is unlikely, what would your preference be about the use of a ventilator (breathing machine) if it was available to you? \"   no      Resuscitation:  \"CPR works best to restart the heart when there is a sudden event, like a heart attack, in someone who is otherwise healthy. Unfortunately, CPR does not typically restart the heart for people who have serious health conditions or who are very sick. \"    \"In the event your heart stopped as a result of an underlying serious health condition, would you want attempts to be made to restart your heart (answer \"yes\" for attempt to resuscitate) or would you prefer a natural death (answer \"no\" for do not attempt to resuscitate)? \"   no    NOTE: If the patient has a valid advance directive AND provides care preference(s) that are inconsistent with that prior directive, advise the patient to consider either: creating a new advance directive that complies with state-specific requirements; or, if that is not possible, orally revoking that prior directive in accordance with state-specific requirements, which must be documented in the EHR.     Conversation Outcomes / Follow-Up Plan:   Recommended completion of Advance Directive      Length of Voluntary ACP Conversation in minutes:  16 minutes      Sudha Stinson MD

## 2020-06-19 NOTE — PATIENT INSTRUCTIONS
Medicare Wellness Visit, Female The best way to live healthy is to have a lifestyle where you eat a well-balanced diet, exercise regularly, limit alcohol use, and quit all forms of tobacco/nicotine, if applicable. Regular preventive services are another way to keep healthy. Preventive services (vaccines, screening tests, monitoring & exams) can help personalize your care plan, which helps you manage your own care. Screening tests can find health problems at the earliest stages, when they are easiest to treat. [facility] follows the current, evidence-based guidelines published by the [facility] ([facility]) when recommending preventive services for our patients. Because we follow these guidelines, sometimes recommendations change over time as research supports it. (For example, mammograms used to be recommended annually. Even though Medicare will still pay for an annual mammogram, the newer guidelines recommend a mammogram every two years for women of average risk). Of course, you and your doctor may decide to screen more often for some diseases, based on your risk and your co-morbidities (chronic disease you are already diagnosed with). Preventive services for you include: - Medicare offers their members a free annual wellness visit, which is time for you and your primary care provider to discuss and plan for your preventive service needs. Take advantage of this benefit every year! 
-All adults over the age of 72 should receive the recommended pneumonia vaccines. Current USPSTF guidelines recommend a series of two vaccines for the best pneumonia protection.  
-All adults should have a flu vaccine yearly and a tetanus vaccine every 10 years.  
-All adults age 48 and older should receive the shingles vaccines (series of two vaccines). -All adults age 38-68 who are overweight should have a diabetes screening test once every three years. -All adults born between [birth year] and 1966 should be screened once for Hepatitis C. 
-Other screening tests and preventive services for persons with diabetes include: an eye exam to screen for diabetic retinopathy, a kidney function test, a foot exam, and stricter control over your cholesterol.  
-Cardiovascular screening for adults with routine risk involves an electrocardiogram (ECG) at intervals determined by your doctor.  
-Colorectal cancer screenings should be done for adults age 54-65 with no increased risk factors for colorectal cancer. There are a number of acceptable methods of screening for this type of cancer. Each test has its own benefits and drawbacks. Discuss with your doctor what is most appropriate for you during your annual wellness visit. The different tests include: colonoscopy (considered the best screening method), a fecal occult blood test, a fecal DNA test, and sigmoidoscopy. 
 
-A bone mass density test is recommended when a woman turns 65 to screen for osteoporosis. This test is only recommended one time, as a screening. Some providers will use this same test as a disease monitoring tool if you already have osteoporosis. -Breast cancer screenings are recommended every other year for women of normal risk, age 54-69. 
-Cervical cancer screenings for women over age 72 are only recommended with certain risk factors. Here is a list of your current Health Maintenance items (your personalized list of preventive services) with a due date: 
Health Maintenance Due Topic Date Due  Shingles Vaccine (1 of 2) 07/03/1994 Aden Magaña Annual Well Visit  04/05/2020  Glaucoma Screening   08/03/2020

## 2020-06-19 NOTE — PROGRESS NOTES
This is the Subsequent Medicare Annual Wellness Exam, performed 12 months or more after the Initial AWV or the last Subsequent AWV    Consent: Jacqueline Martinez, who was seen by synchronous (real-time) audio only technology, and/or her healthcare decision maker, is aware that this patient-initiated, Telehealth encounter on 6/19/2020 is a billable service. While AWVs are fully covered by Medicare, any services rendered on this date that are not included in an AWV are subject to additional billing, with coverage as determined by her insurance carrier. She is aware that she may receive a bill for any such additional services and has provided verbal consent to proceed: Yes. I have reviewed the patient's medical history in detail and updated the computerized patient record. 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    Coronary atherosclerosis of native coronary artery I25.10    S/P CABG x 4 Z95.1    Urge incontinence N39.41    Bradycardia, sinus R00.1    Dyslipidemia E78.5    Advanced care planning/counseling discussion Z71.89    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 9) E66.01    Pacemaker Z95.0    TIA (transient ischemic attack) G45.9    Bilateral carotid artery stenosis I65.23    Transient ischemic attack involving left internal carotid artery G45.1     Past Medical History:   Diagnosis Date    Acquired cyst of kidney     CAD (coronary artery disease)     Cervical disc herniation     Essential hypertension     Hypercholesterolemia     Hypertension     PAF (paroxysmal atrial fibrillation) ([facility]) 6/9/2016    Paroxysmal supraventricular tachycardia ([address]     Postsurgical aortocoronary bypass status 3/20/2012    S/P cardiac pacemaker procedure 6/9/2016 6/9/16 [employer] dual chamber pacemaker implant    Urge incontinence       Past Surgical History:   Procedure Laterality Date    CABG, ARTERY-VEIN, FOUR  2007    COLONOSCOPY N/A 1/5/2018    COLONOSCOPY performed by [facility] at [facility] ENDOSCOPY    ENDOSCOPY, COLON, DIAGNOSTIC      due 2012 hx proctitis    HX CERVICAL DISKECTOMY      HX CORONARY ARTERY BYPASS GRAFT      HX HEENT      Thyroidectomy    HX ORTHOPAEDIC      benign thumb tumor    HX PACEMAKER Left 2016    HX UROLOGICAL  2011    stent for hydronephrosis     Current Outpatient Medications   Medication Sig Dispense Refill    aspirin delayed-release 81 mg tablet Take 1 Tab by mouth daily. 30 Tab 2    atorvastatin (LIPITOR) 40 mg tablet Take 1 Tab by mouth daily. 30 Tab 2    apixaban (ELIQUIS) 5 mg tablet Take 1 Tab by mouth two (2) times a day. 60 Tab 2    losartan-hydroCHLOROthiazide (HYZAAR) 50-12.5 mg per tablet Take 2 Tabs by mouth daily. 60 Tab 5    sotalol (BETAPACE) 80 mg tablet Take 1 Tab by mouth two (2) times a day. 60 Tab 11    PSYLLIUM HUSK (DAILY FIBER PO) Take  by mouth daily.  FOLIC ACID/MULTIVIT-MIN/LUTEIN (CENTRUM SILVER PO) Take  by mouth daily.  omega-3 fatty acids-vitamin e (FISH OIL) 1,000 mg Cap Take  by mouth daily.        Allergies   Allergen Reactions    Ace Inhibitors Cough    Pcn [Penicillins] Anaphylaxis and Hives     Just got Rocephin in ED, no reactions    Niaspan [Niacin] Unable to Obtain       Family History   Problem Relation Age of Onset    Asthma Mother     Hypertension Father    Heraclio Stroke Father     Alcohol abuse Father     Cancer Sister         breast    Diabetes Brother     Heart Disease Sister     Heart Disease Brother      Social History     Tobacco Use    Smoking status: Former Smoker     Packs/day: 0.50     Years: 10.00     Pack years: 5.00     Types: Cigarettes     Last attempt to quit: 2007     Years since quittin.0    Smokeless tobacco: Never Used   Substance Use Topics    Alcohol use: No     Alcohol/week: 0.0 standard drinks       Depression Risk Factor Screening:     3 most recent PHQ Screens 2020   Little interest or pleasure in doing things Not at all   Feeling down, depressed, irritable, or hopeless Not at all   Total Score PHQ 2 0       Alcohol Risk Factor Screening:   Do you average 1 drink per night or more than 7 drinks a week:  No    On any one occasion in the past three months have you have had more than 3 drinks containing alcohol:  No      Functional Ability and Level of Safety:   Hearing: Hearing is good. Activities of Daily Living: The home contains: no safety equipment. Patient does total self care     Ambulation: with no difficulty     Fall Risk:  Fall Risk Assessment, last 12 mths 10/11/2019   Able to walk? Yes   Fall in past 12 months?  No     Abuse Screen:  Patient is not abused       Cognitive Screening   Has your family/caregiver stated any concerns about your memory: no    Cognitive Screening: Normal - Verbal Fluency Test    Patient Care Team   Patient Care Team:  Magali Stack MD as PCP - General (Family Practice)  Magali Stack MD as PCP - [facility]  Allison Juárez RN CCM as Ambulatory Care Manager  Karson Pinedo MD as Physician (Cardiology)  Jamey Houston NP as Nurse Practitioner (Nurse Practitioner)  Demarco Blackwell MD as Physician (Cardiology)    Assessment/Plan     Education and counseling provided:  Are appropriate based on today's review and evaluation    Diagnoses and all orders for this visit: 1. Medicare annual wellness visit, subsequent    2. Advanced directives, counseling/discussion  -     ADVANCE CARE PLANNING FIRST 27 MINS    Doing well today with no major concerns. She has followed up with neurology and continues to follow-up with cardiology and EP. We will plan to bring patient back in October for routine labs. Health Maintenance Due   Topic Date Due    Shingrix Vaccine Age 49> (1 of 2) 07/03/1994    Medicare Yearly Exam  04/05/2020    GLAUCOMA SCREENING Q2Y  08/03/2020       Avril Cooper is a 75 y.o. female who was evaluated by an audio only encounter for concerns as above. Patient identification was verified prior to start of the visit. A caregiver was present when appropriate. Due to this being a TeleHealth encounter (During [facility] public health emergency), evaluation of the following organ systems was limited: Vitals/Constitutional/EENT/Resp/CV/GI//MS/Neuro/Skin/Heme-Lymph-Imm. Pursuant to the emergency declaration under the [facility], 1135 waiver authority and the "[employer] and [employer] Act, this Virtual Visit was conducted, with patient's (and/or legal guardian's) consent, to reduce the patient's risk of exposure to COVID-19 and provide necessary medical care. Services were provided through a synchronous discussion virtually to substitute for in-person clinic visit. I was in the office. The patient was at home.       Jack Vigil MD

## 2020-07-10 DIAGNOSIS — I10 ESSENTIAL HYPERTENSION: ICD-10-CM

## 2020-07-10 RX ORDER — LOSARTAN POTASSIUM AND HYDROCHLOROTHIAZIDE 12.5; 5 MG/1; MG/1
TABLET ORAL
Qty: 180 TAB | Refills: 1 | Status: SHIPPED | OUTPATIENT
Start: 2020-07-10 | End: 2020-09-25 | Stop reason: SDUPTHER

## 2020-07-16 ENCOUNTER — OFFICE VISIT (OUTPATIENT)
Dept: CARDIOLOGY CLINIC | Age: 75
End: 2020-07-16

## 2020-07-16 VITALS
HEART RATE: 60 BPM | SYSTOLIC BLOOD PRESSURE: 130 MMHG | HEIGHT: 63 IN | OXYGEN SATURATION: 98 % | BODY MASS INDEX: 34.16 KG/M2 | RESPIRATION RATE: 16 BRPM | WEIGHT: 192.8 LBS | DIASTOLIC BLOOD PRESSURE: 80 MMHG

## 2020-07-16 DIAGNOSIS — Z95.0 PACEMAKER: ICD-10-CM

## 2020-07-16 DIAGNOSIS — I10 ESSENTIAL HYPERTENSION: Primary | ICD-10-CM

## 2020-07-16 DIAGNOSIS — I25.10 CORONARY ARTERY DISEASE INVOLVING NATIVE CORONARY ARTERY OF NATIVE HEART WITHOUT ANGINA PECTORIS: ICD-10-CM

## 2020-07-16 DIAGNOSIS — E78.2 MIXED HYPERLIPIDEMIA: ICD-10-CM

## 2020-07-16 DIAGNOSIS — I48.0 PAF (PAROXYSMAL ATRIAL FIBRILLATION) (HCC): ICD-10-CM

## 2020-07-16 DIAGNOSIS — I49.5 SSS (SICK SINUS SYNDROME) (HCC): ICD-10-CM

## 2020-07-16 RX ORDER — SOTALOL HYDROCHLORIDE 80 MG/1
80 TABLET ORAL 2 TIMES DAILY
Qty: 60 TAB | Refills: 5 | Status: SHIPPED | OUTPATIENT
Start: 2020-07-16 | End: 2020-08-03

## 2020-07-16 RX ORDER — ATORVASTATIN CALCIUM 40 MG/1
40 TABLET, FILM COATED ORAL DAILY
Qty: 30 TAB | Refills: 5 | Status: SHIPPED | OUTPATIENT
Start: 2020-07-16 | End: 2020-11-20

## 2020-07-16 NOTE — PROGRESS NOTES
HISTORY OF PRESENT ILLNESS  Yasmeen Fowler is a 67 y.o. female     SUMMARY:   Problem List  Date Reviewed: 7/16/2020          Codes Class Noted    TIA (transient ischemic attack) ICD-10-CM: G45.9  ICD-9-CM: 435.9  4/22/2020        Bilateral carotid artery stenosis ICD-10-CM: I65.23  ICD-9-CM: 433.10, 433.30  4/22/2020        Transient ischemic attack involving left internal carotid artery ICD-10-CM: G45.1  ICD-9-CM: 435.8  4/22/2020        Pacemaker ICD-10-CM: Z95.0  ICD-9-CM: V45.01  10/4/2018        Severe obesity (BMI 35.0-39. 9) ICD-10-CM: E66.01  ICD-9-CM: 278.01  6/20/2018        Prediabetes ICD-10-CM: R73.03  ICD-9-CM: 790.29  9/21/2017        S/P cardiac pacemaker procedure ICD-10-CM: Z95.0  ICD-9-CM: V45.01  6/9/2016    Overview Signed 6/9/2016 12:31 PM by Preston Correa NP     6/9/16 [employer] dual chamber pacemaker implant             PAF (paroxysmal atrial fibrillation) [facility]) ICD-10-CM: I48.0  ICD-9-CM: 427.31  6/9/2016    Overview Signed 6/9/2016 12:32 PM by Preston Correa NP     Noted during pacemaker implant on 6/9/16             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 ICD-10-CM: I49.8  ICD-9-CM: 427.89  5/23/2016        APC (atrial premature contractions) ICD-10-CM: I49.1  ICD-9-CM: 427.61  5/23/2016        First degree AV block ICD-10-CM: I44.0  ICD-9-CM: 426.11  5/23/2016        SSS (sick sinus syndrome) ([facility] [address] ICD-10-CM: I49.5  ICD-9-CM: 427.81  5/23/2016        Advanced care planning/counseling discussion ICD-10-CM: Z71.89  ICD-9-CM: V65.49  3/22/2016        Dyslipidemia ICD-10-CM: E78.5  ICD-9-CM: 272.4  6/12/2014        Bradycardia, sinus ICD-10-CM: R00.1  ICD-9-CM: 427.89  5/10/2013        Urge incontinence ICD-10-CM: N39.41  ICD-9-CM: 788.31  Unknown        S/P CABG x 4 ICD-10-CM: Z95.1  ICD-9-CM: V45.81  9/20/2012    Overview Signed 9/20/2012 10:28 AM by Amanda Prieto MD     9/7/12--Dr. Chanell Prescott D1/D2, SVG PDA-RCA Mixed hyperlipidemia ICD-10-CM: E78.2  ICD-9-CM: 272.2  3/20/2012        Chronic ischemic heart disease ICD-10-CM: I25.9  ICD-9-CM: 414.9  3/20/2012        Postsurgical aortocoronary bypass status ICD-10-CM: Z95.1  ICD-9-CM: V45.81  3/20/2012        Coronary atherosclerosis of native coronary artery ICD-10-CM: I25.10  ICD-9-CM: 414.01  3/20/2012        Encounter for long-term (current) use of medications ICD-10-CM: Z79.899  ICD-9-CM: V58.69  7/28/2010        Cervical disc herniation ICD-10-CM: M50.20  ICD-9-CM: 722.0  Unknown        Hypertension ICD-10-CM: I10  ICD-9-CM: 401.9  Unknown        CAD (coronary artery disease) ICD-10-CM: I25.10  ICD-9-CM: 414.00  Unknown        Paroxysmal supraventricular tachycardia ([facility] [address] ICD-10-CM: I47.1  ICD-9-CM: 427.0  Unknown              Current Outpatient Medications on File Prior to Visit   Medication Sig    losartan-hydroCHLOROthiazide (HYZAAR) 50-12.5 mg per tablet TAKE 2 TABLETS BY MOUTH EVERY DAY    aspirin delayed-release 81 mg tablet Take 1 Tab by mouth daily.  atorvastatin (LIPITOR) 40 mg tablet Take 1 Tab by mouth daily.  apixaban (ELIQUIS) 5 mg tablet Take 1 Tab by mouth two (2) times a day.  sotalol (BETAPACE) 80 mg tablet Take 1 Tab by mouth two (2) times a day.  PSYLLIUM HUSK (DAILY FIBER PO) Take  by mouth daily.  FOLIC ACID/MULTIVIT-MIN/LUTEIN (CENTRUM SILVER PO) Take  by mouth daily.  omega-3 fatty acids-vitamin e (FISH OIL) 1,000 mg Cap Take  by mouth daily. No current facility-administered medications on file prior to visit. CARDIOLOGY STUDIES TO DATE:  04/21/20  echo normal lvef, lvh, lae, mild mr, mild tr with pa pressure 44mm    Chief Complaint   Patient presents with    Hypertension     6 month follow up, no cardiac concerns      HPI :  Ms. Arturo Wood was hospitalized in April with a small stroke involving her right hand. She recovered completely.   She had an echo which I reviewed and summarized above and they talked her into going on to Eliquis which I totally agree with. We talked about that before and she was reluctant at that time. She still working at [employer].  Blood pressures under good control and her lipid profile in April looked great as did her A1c. CARDIAC ROS:   negative for chest pain, dyspnea, palpitations, syncope, orthopnea, paroxysmal nocturnal dyspnea, exertional chest pressure/discomfort, claudication, lower extremity edema    Family History   Problem Relation Age of Onset    Asthma Mother     Hypertension Father     Stroke Father     Alcohol abuse Father     Cancer Sister         breast    Diabetes Brother     Heart Disease Sister     Heart Disease Brother        Past Medical History:   Diagnosis Date    Acquired cyst of kidney     CAD (coronary artery disease)     Cervical disc herniation     Essential hypertension     Hypercholesterolemia     Hypertension     PAF (paroxysmal atrial fibrillation) ([address] 6/9/2016    Paroxysmal supraventricular tachycardia ([facility] [address]     Postsurgical aortocoronary bypass status 3/20/2012    S/P cardiac pacemaker procedure 6/9/2016 6/9/16 [employer] dual chamber pacemaker implant    Urge incontinence        GENERAL ROS:  A comprehensive review of systems was negative except for that written in the HPI.     Visit Vitals  /80 (BP 1 Location: Right arm, BP Patient Position: Sitting)   Pulse 60   Resp 16   Ht 5' 3\" (1.6 m)   Wt 192 lb 12.8 oz (87.5 kg)   LMP 08/24/1993   SpO2 98%   BMI 34.15 kg/m²       Wt Readings from Last 3 Encounters:   07/16/20 192 lb 12.8 oz (87.5 kg)   06/02/20 187 lb (84.8 kg)   04/22/20 195 lb 1.7 oz (88.5 kg)            BP Readings from Last 3 Encounters:   07/16/20 130/80   04/29/20 124/77   04/23/20 128/77       PHYSICAL EXAM  General appearance: alert, cooperative, no distress, appears stated age  Neurologic: Alert and oriented X 3  Neck: supple, symmetrical, trachea midline, no adenopathy, no carotid bruit and no JVD  Lungs: clear to auscultation bilaterally  Heart: regular rate and rhythm, S1, S2 normal, no murmur, click, rub or gallop  Extremities: extremities normal, atraumatic, no cyanosis or edema    Lab Results   Component Value Date/Time    Cholesterol, total 154 04/22/2020 12:10 AM    Cholesterol, total 123 10/11/2019 12:04 PM    Cholesterol, total 127 04/05/2019 09:11 AM    Cholesterol, total 134 03/22/2018 11:34 AM    Cholesterol, total 132 09/21/2017 11:17 AM    HDL Cholesterol 57 04/22/2020 12:10 AM    HDL Cholesterol 44 10/11/2019 12:04 PM    HDL Cholesterol 44 04/05/2019 09:11 AM    HDL Cholesterol 44 03/22/2018 11:34 AM    HDL Cholesterol 43 09/21/2017 11:17 AM    LDL, calculated 83.4 04/22/2020 12:10 AM    LDL, calculated 69 10/11/2019 12:04 PM    LDL, calculated 71 04/05/2019 09:11 AM    LDL, calculated 75 03/22/2018 11:34 AM    LDL, calculated 75 09/21/2017 11:17 AM    Triglyceride 68 04/22/2020 12:10 AM    Triglyceride 49 10/11/2019 12:04 PM    Triglyceride 58 04/05/2019 09:11 AM    Triglyceride 73 03/22/2018 11:34 AM    Triglyceride 68 09/21/2017 11:17 AM    CHOL/HDL Ratio 2.7 04/22/2020 12:10 AM    CHOL/HDL Ratio 3.4 07/28/2010 11:07 AM    CHOL/HDL Ratio 3.5 08/31/2009 01:31 PM    CHOL/HDL Ratio 3.2 01/21/2009 02:19 PM     ASSESSMENT :      She is stable and asymptomatic, well compensated on a good medical regimen and needs no cardiac testing at this time. current treatment plan is effective, no change in therapy  lab results and schedule of future lab studies reviewed with patient  reviewed diet, exercise and weight control    Encounter Diagnoses   Name Primary?  Essential hypertension Yes    PAF [facility]oxysmal atrial fibrillation) ([facility])     SSS (sick sinus syndrome) ([facility])     Pacemaker     Mixed hyperlipidemia     Coronary artery disease involving native coronary artery of native heart without angina pectoris      No orders of the defined types were placed in this encounter.       Follow-up and Dispositions · Return in about 6 months (around 1/16/2021). [address], [place]  7/16/2020  Please note that this dictation was completed with [employer], the computer voice recognition software. Quite often unanticipated grammatical, syntax, homophones, and other interpretive errors are inadvertently transcribed by the computer software. Please disregard these errors. Please excuse any errors that have escaped final proofreading. Thank you.

## 2020-07-16 NOTE — PROGRESS NOTES
Chief Complaint   Patient presents with    Hypertension     6 month follow up, no cardiac concerns      1. Have you been to the ER, urgent care clinic since your last visit? Hospitalized since your last visit? yes 4/21/2020 [address] [facility] ED     2. Have you seen or consulted any other health care providers outside of the [address] [place] [address] since your last visit? Include any pap smears or colon screening.  NO

## 2020-08-03 RX ORDER — SOTALOL HYDROCHLORIDE 80 MG/1
TABLET ORAL
Qty: 180 TAB | Refills: 3 | Status: SHIPPED | OUTPATIENT
Start: 2020-08-03 | End: 2020-11-20

## 2020-09-25 DIAGNOSIS — I10 ESSENTIAL HYPERTENSION: ICD-10-CM

## 2020-09-25 RX ORDER — LOSARTAN POTASSIUM AND HYDROCHLOROTHIAZIDE 12.5; 5 MG/1; MG/1
TABLET ORAL
Qty: 180 TAB | Refills: 1 | Status: SHIPPED | OUTPATIENT
Start: 2020-09-25 | End: 2020-12-31

## 2020-11-20 RX ORDER — SOTALOL HYDROCHLORIDE 80 MG/1
TABLET ORAL
Qty: 180 TAB | Refills: 3 | Status: SHIPPED | OUTPATIENT
Start: 2020-11-20 | End: 2021-10-14

## 2020-11-20 RX ORDER — ATORVASTATIN CALCIUM 40 MG/1
TABLET, FILM COATED ORAL
Qty: 90 TAB | Refills: 3 | Status: SHIPPED | OUTPATIENT
Start: 2020-11-20 | End: 2021-10-14

## 2020-12-01 ENCOUNTER — CLINICAL SUPPORT (OUTPATIENT)
Dept: CARDIOLOGY CLINIC | Age: 75
End: 2020-12-01
Payer: MEDICARE

## 2020-12-01 ENCOUNTER — OFFICE VISIT (OUTPATIENT)
Dept: CARDIOLOGY CLINIC | Age: 75
End: 2020-12-01
Payer: MEDICARE

## 2020-12-01 VITALS
OXYGEN SATURATION: 96 % | SYSTOLIC BLOOD PRESSURE: 130 MMHG | DIASTOLIC BLOOD PRESSURE: 80 MMHG | BODY MASS INDEX: 34.69 KG/M2 | RESPIRATION RATE: 16 BRPM | HEART RATE: 86 BPM | HEIGHT: 63 IN | WEIGHT: 195.8 LBS

## 2020-12-01 DIAGNOSIS — I10 ESSENTIAL HYPERTENSION: ICD-10-CM

## 2020-12-01 DIAGNOSIS — Z95.0 CARDIAC PACEMAKER IN SITU: Primary | ICD-10-CM

## 2020-12-01 DIAGNOSIS — I49.5 SSS (SICK SINUS SYNDROME) (HCC): ICD-10-CM

## 2020-12-01 DIAGNOSIS — I48.0 PAF (PAROXYSMAL ATRIAL FIBRILLATION) (HCC): Primary | ICD-10-CM

## 2020-12-01 DIAGNOSIS — I25.9 CHRONIC ISCHEMIC HEART DISEASE: ICD-10-CM

## 2020-12-01 DIAGNOSIS — I44.0 FIRST DEGREE AV BLOCK: ICD-10-CM

## 2020-12-01 DIAGNOSIS — E78.2 MIXED HYPERLIPIDEMIA: ICD-10-CM

## 2020-12-01 PROCEDURE — G8417 CALC BMI ABV UP PARAM F/U: HCPCS | Performed by: INTERNAL MEDICINE

## 2020-12-01 PROCEDURE — G8432 DEP SCR NOT DOC, RNG: HCPCS | Performed by: INTERNAL MEDICINE

## 2020-12-01 PROCEDURE — G8399 PT W/DXA RESULTS DOCUMENT: HCPCS | Performed by: INTERNAL MEDICINE

## 2020-12-01 PROCEDURE — G8754 DIAS BP LESS 90: HCPCS | Performed by: INTERNAL MEDICINE

## 2020-12-01 PROCEDURE — 93000 ELECTROCARDIOGRAM COMPLETE: CPT | Performed by: INTERNAL MEDICINE

## 2020-12-01 PROCEDURE — 93280 PM DEVICE PROGR EVAL DUAL: CPT | Performed by: INTERNAL MEDICINE

## 2020-12-01 PROCEDURE — G8427 DOCREV CUR MEDS BY ELIG CLIN: HCPCS | Performed by: INTERNAL MEDICINE

## 2020-12-01 PROCEDURE — 1101F PT FALLS ASSESS-DOCD LE1/YR: CPT | Performed by: INTERNAL MEDICINE

## 2020-12-01 PROCEDURE — 99214 OFFICE O/P EST MOD 30 MIN: CPT | Performed by: INTERNAL MEDICINE

## 2020-12-01 PROCEDURE — 1090F PRES/ABSN URINE INCON ASSESS: CPT | Performed by: INTERNAL MEDICINE

## 2020-12-01 PROCEDURE — G8752 SYS BP LESS 140: HCPCS | Performed by: INTERNAL MEDICINE

## 2020-12-01 PROCEDURE — G8536 NO DOC ELDER MAL SCRN: HCPCS | Performed by: INTERNAL MEDICINE

## 2020-12-01 NOTE — PROGRESS NOTES
Subjective:      Laura Ring is a 67 y.o. female is here for EP consult. Here for annual device check. The patient denies chest pain/ shortness of breath, orthopnea, PND, LE edema, palpitations, syncope, presyncope or fatigue.        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 05/23/2016    APC (atrial premature contractions) 05/23/2016    First degree AV block 05/23/2016    SSS (sick sinus syndrome) ([address] 05/23/2016    Advanced care planning/counseling discussion 03/22/2016    Dyslipidemia 06/12/2014    Bradycardia, sinus 05/10/2013    Urge incontinence     S/P CABG x 4 09/20/2012    Mixed hyperlipidemia 03/20/2012    Chronic ischemic heart disease 03/20/2012    Postsurgical aortocoronary bypass status 03/20/2012    Coronary atherosclerosis of native coronary artery 03/20/2012    Encounter for long-term (current) use of medications 07/28/2010    Cervical disc herniation     Hypertension     CAD (coronary artery disease)     Paroxysmal supraventricular tachycardia ([address]       Sarah Cary MD  Past Medical History:   Diagnosis Date    Acquired cyst of kidney     CAD (coronary artery disease)     Cervical disc herniation     Essential hypertension     Hypercholesterolemia     Hypertension     PAF (paroxysmal atrial fibrillation) ([address] 6/9/2016    Paroxysmal supraventricular tachycardia ([address]     Postsurgical aortocoronary bypass status 3/20/2012    S/P cardiac pacemaker procedure 6/9/2016 6/9/16 [employer] dual chamber pacemaker implant    Urge incontinence       Past Surgical History:   Procedure Laterality Date    CABG, ARTERY-VEIN, FOUR      COLONOSCOPY N/A 2018    COLONOSCOPY [facility]    ENDOSCOPY, COLON, DIAGNOSTIC      due  hx proctitis    HX CERVICAL DISKECTOMY      HX CORONARY ARTERY BYPASS GRAFT      HX HEENT      Thyroidectomy    HX ORTHOPAEDIC      benign thumb tumor    HX PACEMAKER Left 2016    HX UROLOGICAL  2011    stent for hydronephrosis     Allergies   Allergen Reactions    Ace Inhibitors Cough    Pcn [Penicillins] Anaphylaxis and Hives     Just got Rocephin in ED, no reactions    Niaspan [Niacin] Unable to Obtain      Family History   Problem Relation Age of Onset    Asthma Mother     Hypertension Father     Stroke Father     Alcohol abuse Father     Cancer Sister         breast    Diabetes Brother     Heart Disease Sister     Heart Disease Brother     negative for cardiac disease  Social History     Socioeconomic History    Marital status:      Spouse name: Not on file    Number of children: Not on file    Years of education: Not on file    Highest education level: Not on file   Tobacco Use    Smoking status: Former Smoker     Packs/day: 0.50     Years: 10.00     Pack years: 5.00     Types: Cigarettes     Last attempt to quit: 2007     Years since quittin.4    Smokeless tobacco: Never Used   Substance and Sexual Activity    Alcohol use: No     Alcohol/week: 0.0 standard drinks    Drug use: No    Sexual activity: Never     Current Outpatient Medications   Medication Sig    atorvastatin (LIPITOR) 40 mg tablet TAKE 1 TABLET BY MOUTH  DAILY    sotaloL (BETAPACE) 80 mg tablet TAKE 1 TABLET BY MOUTH  TWICE DAILY    losartan-hydroCHLOROthiazide (HYZAAR) 50-12.5 mg per tablet TAKE 2 TABLETS BY MOUTH EVERY DAY    apixaban (ELIQUIS) 5 mg tablet Take 1 Tab by mouth two (2) times a day.     aspirin delayed-release 81 mg tablet Take 1 Tab by mouth daily.  PSYLLIUM HUSK (DAILY FIBER PO) Take  by mouth daily.  FOLIC ACID/MULTIVIT-MIN/LUTEIN (CENTRUM SILVER PO) Take  by mouth daily.  omega-3 fatty acids-vitamin e (FISH OIL) 1,000 mg Cap Take  by mouth daily. No current facility-administered medications for this visit. Vitals:    12/01/20 1339   BP: 130/80   Pulse: 86   Resp: 16   SpO2: 96%   Weight: 195 lb 12.8 oz (88.8 kg)   Height: 5' 3\" (1.6 m)       I have reviewed the nurses notes, vitals, problem list, allergy list, medical history, family, social history and medications. Review of Symptoms:    General: Pt denies excessive weight gain or loss. Pt is able to conduct ADL's  HEENT: Denies blurred vision, headaches, hearing loss, epistaxis and difficulty swallowing. Respiratory: Denies cough, congestion, shortness of breath, GARZA, wheezing or stridor. Cardiovascular: Denies precordial pain, palpitations, edema or PND  Gastrointestinal: Denies poor appetite, indigestion, abdominal pain or blood in stool  Genitourinary: Denies hematuria, dysuria, increased urinary frequency  Musculoskeletal: Denies joint pain or swelling from muscles or joints  Neurologic: Denies tremor, paresthesias, headache, or sensory motor disturbance  Psychiatric: Denies confusion, insomnia, depression  Integumentray: Denies rash, itching or ulcers. Hematologic: Denies easy bruising, bleeding    Physical Exam:      General: Well developed, in no acute distress. HEENT: Eyes - PERRL, no jvd  Heart:  Normal S1/S2 negative S3 or S4. Regular, no murmur, gallop or rub. Respiratory: Clear bilaterally x 4, no wheezing or rales  Abdomen:   Soft, non-tender, bowel sounds are active. Extremities:  No edema, normal cap refill, no cyanosis. Musculoskeletal: No clubbing  Neuro: A&Ox3, speech clear, gait stable. Skin: Skin color is normal. No rashes or lesions.  Non diaphoretic, no ulcers or subcutaneous nodule  Vascular: 2+ pulses symmetric in all extremities  Psych - judgement intact and orientation is wnl     Cardiographics    Ekg: A paced    Pacer - A paced 78%, v paced 8%    Results for orders placed or performed during the hospital encounter of 04/21/20   EKG, 12 LEAD, INITIAL   Result Value Ref Range    Ventricular Rate 63 BPM    Atrial Rate 63 BPM    P-R Interval 244 ms    QRS Duration 92 ms    Q-T Interval 424 ms    QTC Calculation (Bezet) 433 ms    Calculated P Axis 47 degrees    Calculated R Axis 22 degrees    Calculated T Axis 31 degrees    Diagnosis       Sinus rhythm with 1st degree AV block  Possible Anterior infarct , age undetermined  When compared with ECG of 10-ABDELRAHMAN-2016 04:59,  premature atrial complexes are no longer present  Nonspecific T wave abnormality no longer evident in Anterolateral leads  Confirmed by Deo Bosch PMegVMeg ([id number]) on 4/22/2020 10:00:18 AM     Results for orders placed or performed in visit on 05/23/16   CARDIAC HOLTER MONITOR, 24 HOURS    Narrative    ECG Monitor/24 hours, Complete    Reason for Holter Monitor  ECTOPIC ATRIAL RHYTHM    Heartbeat    Slowest 42  Average 81  Fastest  138        Results:   Underlying Rhythm: Normal sinus rhythm      Atrial Arrhythmias: premature atrial contractions; occasional,  paroxysmal atrial fibrillation, paroxysmal atrial flutter and  severe bradycardia            AV Conduction: normal    Ventricular Arrhythmias: premature ventricular contractions;  occasional     ST Segment Analysis:normal     Symptom Correlation:  None reported    Comment:   Sinus rhythm with\ profound daytime bradycardia in the 40s and  pafib/pafl with rvr. Clinical correlation advised.      David Mota MD, [facility]            Lab Results   Component Value Date/Time    WBC 3.6 04/22/2020 12:10 AM    HGB 12.6 04/22/2020 12:10 AM    HCT 39.0 04/22/2020 12:10 AM    PLATELET 108 66/91/8159 12:10 AM    MCV 84.6 04/22/2020 12:10 AM      Lab Results   Component Value Date/Time    Sodium 138 04/23/2020 04:00 AM Potassium 3.7 04/23/2020 04:00 AM    Chloride 105 04/23/2020 04:00 AM    CO2 25 04/23/2020 04:00 AM    Anion gap 8 04/23/2020 04:00 AM    Glucose 80 04/23/2020 04:00 AM    BUN 19 04/23/2020 04:00 AM    Creatinine 0.78 04/23/2020 04:00 AM    BUN/Creatinine ratio 24 (H) 04/23/2020 04:00 AM    GFR est AA >60 04/23/2020 04:00 AM    GFR est non-AA >60 04/23/2020 04:00 AM    Calcium 8.7 04/23/2020 04:00 AM    Bilirubin, total 0.5 04/22/2020 12:10 AM    Alk. phosphatase 100 04/22/2020 12:10 AM    Protein, total 8.5 (H) 04/22/2020 12:10 AM    Albumin 4.0 04/22/2020 12:10 AM    Globulin 4.5 (H) 04/22/2020 12:10 AM    A-G Ratio 0.9 (L) 04/22/2020 12:10 AM    ALT (SGPT) 26 04/22/2020 12:10 AM         Assessment:     Assessment:        ICD-10-CM ICD-9-CM    1. PAF (paroxysmal atrial fibrillation) ([facility])  I48.0 427.31 AMB POC EKG ROUTINE W/ 12 LEADS, INTER & REP   2. Essential hypertension  I10 401.9    3. Chronic ischemic heart disease  I25.9 414.9    4. SSS (sick sinus syndrome) ([facility])  I49.5 427.81    5. First degree AV block  I44.0 426.11    6. Mixed hyperlipidemia  E78.2 272.2      Orders Placed This Encounter    AMB POC EKG ROUTINE W/ 12 LEADS, INTER & REP     Order Specific Question:   Reason for Exam:     Answer:   routine        Plan:   Rosa Quezada is doing well. She is A paced 78% for sick sinus. She is v paced 8% for mobitz ii heart block. She is oac for PAF. Cont med rx for htn and hyperlipidemia. Rosa Quezada will follow up with me in one year and in the device clinic per routine. Continue medical management for sss, af, htn and hyperlipidemia. Thank you for allowing me to participate in Rosa Quezada 's care.     Alejandra Pedroza MD, [facility]

## 2020-12-01 NOTE — PROGRESS NOTES
Chief Complaint   Patient presents with    Follow-up    Coronary Artery Disease    Pacemaker Check    Irregular Heart Beat     1. Have you been to the ER, urgent care clinic since your last visit? Hospitalized since your last visit? No    2. Have you seen or consulted any other health care providers outside of the [address] [place] [address] since your last visit? Include any pap smears or colon screening. No    Patient C/O nose bleeds at times since taking Eliquis.

## 2020-12-01 NOTE — LETTER
12/1/20 Patient: Tiffany Jonas YOB: 1945 Date of Visit: 12/1/2020 Josie Gould MD 
[address] [place] [id number] VIA In Basket Dear Josie Gould MD, Thank you for referring Ms. Carnetti Banks to [facility] for evaluation. My notes for this consultation are attached. If you have questions, please do not hesitate to call me. I look forward to following your patient along with you. Sincerely, Kofi Ryan MD

## 2020-12-31 DIAGNOSIS — I10 ESSENTIAL HYPERTENSION: ICD-10-CM

## 2020-12-31 RX ORDER — LOSARTAN POTASSIUM AND HYDROCHLOROTHIAZIDE 12.5; 5 MG/1; MG/1
TABLET ORAL
Qty: 180 TAB | Refills: 3 | Status: SHIPPED | OUTPATIENT
Start: 2020-12-31 | End: 2022-01-02

## 2020-12-31 RX ORDER — APIXABAN 5 MG/1
TABLET, FILM COATED ORAL
Qty: 60 TAB | Refills: 11 | Status: SHIPPED | OUTPATIENT
Start: 2020-12-31 | End: 2022-01-16

## 2021-01-19 ENCOUNTER — OFFICE VISIT (OUTPATIENT)
Dept: CARDIOLOGY CLINIC | Age: 76
End: 2021-01-19
Payer: MEDICARE

## 2021-01-19 VITALS
HEART RATE: 60 BPM | RESPIRATION RATE: 20 BRPM | DIASTOLIC BLOOD PRESSURE: 79 MMHG | HEIGHT: 63 IN | BODY MASS INDEX: 34.76 KG/M2 | TEMPERATURE: 97.9 F | OXYGEN SATURATION: 100 % | SYSTOLIC BLOOD PRESSURE: 138 MMHG | WEIGHT: 196.2 LBS

## 2021-01-19 DIAGNOSIS — I10 ESSENTIAL HYPERTENSION: ICD-10-CM

## 2021-01-19 DIAGNOSIS — E78.5 DYSLIPIDEMIA: ICD-10-CM

## 2021-01-19 DIAGNOSIS — I48.0 PAF (PAROXYSMAL ATRIAL FIBRILLATION) (HCC): ICD-10-CM

## 2021-01-19 DIAGNOSIS — Z95.0 PACEMAKER: ICD-10-CM

## 2021-01-19 DIAGNOSIS — I25.10 ATHEROSCLEROSIS OF NATIVE CORONARY ARTERY OF NATIVE HEART WITHOUT ANGINA PECTORIS: Primary | ICD-10-CM

## 2021-01-19 DIAGNOSIS — I49.5 SSS (SICK SINUS SYNDROME) (HCC): ICD-10-CM

## 2021-01-19 PROCEDURE — G8752 SYS BP LESS 140: HCPCS | Performed by: SPECIALIST

## 2021-01-19 PROCEDURE — G8417 CALC BMI ABV UP PARAM F/U: HCPCS | Performed by: SPECIALIST

## 2021-01-19 PROCEDURE — 1101F PT FALLS ASSESS-DOCD LE1/YR: CPT | Performed by: SPECIALIST

## 2021-01-19 PROCEDURE — 99213 OFFICE O/P EST LOW 20 MIN: CPT | Performed by: SPECIALIST

## 2021-01-19 PROCEDURE — G8754 DIAS BP LESS 90: HCPCS | Performed by: SPECIALIST

## 2021-01-19 PROCEDURE — 1090F PRES/ABSN URINE INCON ASSESS: CPT | Performed by: SPECIALIST

## 2021-01-19 PROCEDURE — G8399 PT W/DXA RESULTS DOCUMENT: HCPCS | Performed by: SPECIALIST

## 2021-01-19 PROCEDURE — G8510 SCR DEP NEG, NO PLAN REQD: HCPCS | Performed by: SPECIALIST

## 2021-01-19 PROCEDURE — G8427 DOCREV CUR MEDS BY ELIG CLIN: HCPCS | Performed by: SPECIALIST

## 2021-01-19 PROCEDURE — G8536 NO DOC ELDER MAL SCRN: HCPCS | Performed by: SPECIALIST

## 2021-01-19 NOTE — PROGRESS NOTES
HISTORY OF PRESENT ILLNESS  Gabriela Barajas is a 67 y.o. female     SUMMARY:   Problem List  Date Reviewed: 1/19/2021          Codes Class Noted    TIA (transient ischemic attack) ICD-10-CM: G45.9  ICD-9-CM: 435.9  4/22/2020        Bilateral carotid artery stenosis ICD-10-CM: I65.23  ICD-9-CM: 433.10, 433.30  4/22/2020        Transient ischemic attack involving left internal carotid artery ICD-10-CM: G45.1  ICD-9-CM: 435.8  4/22/2020        Pacemaker ICD-10-CM: Z95.0  ICD-9-CM: V45.01  10/4/2018        Severe obesity (BMI 35.0-39. 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 ICD-10-CM: I49.8  ICD-9-CM: 427.89  5/23/2016        APC (atrial premature contractions) ICD-10-CM: I49.1  ICD-9-CM: 427.61  5/23/2016        First degree AV block ICD-10-CM: I44.0  ICD-9-CM: 426.11  5/23/2016        SSS (sick sinus syndrome) ([facility] ICD-10-CM: I49.5  ICD-9-CM: 427.81  5/23/2016        Advanced care planning/counseling discussion ICD-10-CM: Z71.89  ICD-9-CM: V65.49  3/22/2016        Dyslipidemia ICD-10-CM: E78.5  ICD-9-CM: 272.4  6/12/2014        Bradycardia, sinus ICD-10-CM: R00.1  ICD-9-CM: 427.89  5/10/2013        Urge incontinence ICD-10-CM: N39.41  ICD-9-CM: 788.31  Unknown        S/P CABG x 4 ICD-10-CM: Z95.1  ICD-9-CM: V45.81  9/20/2012    Overview Signed 9/20/2012 10:28 AM by Mohan Argueta     9/7/12--Dr. Radames Oscar--VASQUEZ-LAD,SVG D1/D2, SVG PDA-RCA             Mixed hyperlipidemia ICD-10-CM: E78.2  ICD-9-CM: 272.2  3/20/2012        Chronic ischemic heart disease ICD-10-CM: I25.9  ICD-9-CM: 414.9  3/20/2012        Postsurgical aortocoronary bypass status ICD-10-CM: Z95.1  ICD-9-CM: V45.81  3/20/2012        Coronary atherosclerosis of native coronary artery ICD-10-CM: I25.10  ICD-9-CM: 414.01  3/20/2012        Encounter for long-term (current) use of medications ICD-10-CM: Z79.899  ICD-9-CM: V58.69  7/28/2010        Cervical disc herniation ICD-10-CM: M50.20  ICD-9-CM: 722.0  Unknown        Hypertension ICD-10-CM: I10  ICD-9-CM: 401.9  Unknown        CAD (coronary artery disease) ICD-10-CM: I25.10  ICD-9-CM: 414.00  Unknown        Paroxysmal supraventricular tachycardia (HCC) ICD-10-CM: I47.1  ICD-9-CM: 427.0  Unknown              Current Outpatient Medications on File Prior to Visit   Medication Sig    Eliquis 5 mg tablet TAKE 1 TABLET BY MOUTH  TWICE DAILY    losartan-hydroCHLOROthiazide (HYZAAR) 50-12.5 mg per tablet TAKE 2 TABLETS BY MOUTH  DAILY    atorvastatin (LIPITOR) 40 mg tablet TAKE 1 TABLET BY MOUTH  DAILY    sotaloL (BETAPACE) 80 mg tablet TAKE 1 TABLET BY MOUTH  TWICE DAILY    aspirin delayed-release 81 mg tablet Take 1 Tab by mouth daily.  PSYLLIUM HUSK (DAILY FIBER PO) Take  by mouth daily.  FOLIC ACID/MULTIVIT-MIN/LUTEIN (CENTRUM SILVER PO) Take  by mouth daily.  omega-3 fatty acids-vitamin e (FISH OIL) 1,000 mg Cap Take  by mouth daily. No current facility-administered medications on file prior to visit. CARDIOLOGY STUDIES TO DATE:  [facility] DCPM DOI 06/09/16, on remote, NOT MRI Conditional    04/21/20 echo normal lvef, lvh, lae, mild mr, mild tr with pa pressure 44mm    Chief Complaint   Patient presents with    Follow-up     HPI :  She is doing great. Still working at [employer] on [employer] full-time and having no worrisome symptoms or problems with her medications.   Blood pressure is well controlled and her primary care is following her lipids. She is keeping up with her pacemaker checks. CARDIAC ROS:   negative for chest pain, dyspnea, palpitations, syncope, orthopnea, paroxysmal nocturnal dyspnea, exertional chest pressure/discomfort, claudication, lower extremity edema    Family History   Problem Relation Age of Onset    Asthma Mother     Hypertension Father     Stroke Father     Alcohol abuse Father     Cancer Sister         breast    Diabetes Brother     Heart Disease Sister     Heart Disease Brother        Past Medical History:   Diagnosis Date    Acquired cyst of kidney     CAD (coronary artery disease)     Cervical disc herniation     Essential hypertension     Hypercholesterolemia     Hypertension     PAF (paroxysmal atrial fibrillation) ([facility] 6/9/2016    Paroxysmal supraventricular tachycardia ([facility]     Postsurgical aortocoronary bypass status 3/20/2012    S/P cardiac pacemaker procedure 6/9/2016 6/9/16 [employer] dual chamber pacemaker implant    Urge incontinence        GENERAL ROS:  A comprehensive review of systems was negative except for that written in the HPI.     Visit Vitals  /79 (BP 1 Location: Left arm, BP Patient Position: Sitting)   Pulse 60   Temp 97.9 °F (36.6 °C) (Temporal)   Resp 20   Ht 5' 3\" (1.6 m)   Wt 196 lb 3.2 oz (89 kg)   LMP 08/24/1993   SpO2 100%   BMI 34.76 kg/m²       Wt Readings from Last 3 Encounters:   01/19/21 196 lb 3.2 oz (89 kg)   12/01/20 195 lb 12.8 oz (88.8 kg)   07/16/20 192 lb 12.8 oz (87.5 kg)            BP Readings from Last 3 Encounters:   01/19/21 138/79   12/01/20 130/80   07/16/20 130/80       PHYSICAL EXAM  General appearance: alert, cooperative, no distress, appears stated age  Neurologic: Alert and oriented X 3  Neck: supple, symmetrical, trachea midline, no adenopathy, no carotid bruit and no JVD  Lungs: clear to auscultation bilaterally  Heart: regular rate and rhythm, S1, S2 normal, no murmur, click, rub or gallop  Extremities: extremities normal, atraumatic, no cyanosis or edema    Lab Results   Component Value Date/Time    Cholesterol, total 154 04/22/2020 12:10 AM    Cholesterol, total 123 10/11/2019 12:04 PM    Cholesterol, total 127 04/05/2019 09:11 AM    Cholesterol, total 134 03/22/2018 11:34 AM    Cholesterol, total 132 09/21/2017 11:17 AM    HDL Cholesterol 57 04/22/2020 12:10 AM    HDL Cholesterol 44 10/11/2019 12:04 PM    HDL Cholesterol 44 04/05/2019 09:11 AM    HDL Cholesterol 44 03/22/2018 11:34 AM    HDL Cholesterol 43 09/21/2017 11:17 AM    LDL, calculated 83.4 04/22/2020 12:10 AM    LDL, calculated 69 10/11/2019 12:04 PM    LDL, calculated 71 04/05/2019 09:11 AM    LDL, calculated 75 03/22/2018 11:34 AM    LDL, calculated 75 09/21/2017 11:17 AM    Triglyceride 68 04/22/2020 12:10 AM    Triglyceride 49 10/11/2019 12:04 PM    Triglyceride 58 04/05/2019 09:11 AM    Triglyceride 73 03/22/2018 11:34 AM    Triglyceride 68 09/21/2017 11:17 AM    CHOL/HDL Ratio 2.7 04/22/2020 12:10 AM    CHOL/HDL Ratio 3.4 07/28/2010 11:07 AM    CHOL/HDL Ratio 3.5 08/31/2009 01:31 PM    CHOL/HDL Ratio 3.2 01/21/2009 02:19 PM     ASSESSMENT :      She is stable and asymptomatic, well compensated on a good medical regimen and needs no cardiac testing at this time. current treatment plan is effective, no change in therapy  reviewed diet, exercise and weight control  very strongly urged to quit smoking to reduce cardiovascular risk    Encounter Diagnoses   Name Primary?  Atherosclerosis of native coronary artery of native heart without angina pectoris Yes    SSS (sick sinus syndrome) (HCC)     PAF (paroxysmal atrial fibrillation) (HCC)     Dyslipidemia     Pacemaker     Essential hypertension      No orders of the defined types were placed in this encounter. Follow-up and Dispositions    · Return in about 6 months (around 7/19/2021).          Antonio Prescott MD  1/19/2021  Please note that this dictation was completed with [employer], the computer voice recognition software. Quite often unanticipated grammatical, syntax, homophones, and other interpretive errors are inadvertently transcribed by the computer software. Please disregard these errors. Please excuse any errors that have escaped final proofreading. Thank you.

## 2021-03-04 ENCOUNTER — OFFICE VISIT (OUTPATIENT)
Dept: CARDIOLOGY CLINIC | Age: 76
End: 2021-03-04
Payer: MEDICARE

## 2021-03-04 DIAGNOSIS — Z95.0 CARDIAC PACEMAKER IN SITU: Primary | ICD-10-CM

## 2021-03-04 DIAGNOSIS — I49.5 SSS (SICK SINUS SYNDROME) (HCC): ICD-10-CM

## 2021-03-04 PROCEDURE — 93296 REM INTERROG EVL PM/IDS: CPT | Performed by: INTERNAL MEDICINE

## 2021-03-04 PROCEDURE — 93294 REM INTERROG EVL PM/LDLS PM: CPT | Performed by: INTERNAL MEDICINE

## 2021-06-03 ENCOUNTER — OFFICE VISIT (OUTPATIENT)
Dept: CARDIOLOGY CLINIC | Age: 76
End: 2021-06-03
Payer: MEDICARE

## 2021-06-03 DIAGNOSIS — I49.5 SSS (SICK SINUS SYNDROME) (HCC): ICD-10-CM

## 2021-06-03 DIAGNOSIS — Z95.0 CARDIAC PACEMAKER IN SITU: Primary | ICD-10-CM

## 2021-06-03 PROCEDURE — 93296 REM INTERROG EVL PM/IDS: CPT | Performed by: INTERNAL MEDICINE

## 2021-06-03 PROCEDURE — 93294 REM INTERROG EVL PM/LDLS PM: CPT | Performed by: INTERNAL MEDICINE

## 2021-07-20 ENCOUNTER — OFFICE VISIT (OUTPATIENT)
Dept: CARDIOLOGY CLINIC | Age: 76
End: 2021-07-20
Payer: MEDICARE

## 2021-07-20 VITALS
SYSTOLIC BLOOD PRESSURE: 140 MMHG | OXYGEN SATURATION: 99 % | DIASTOLIC BLOOD PRESSURE: 83 MMHG | HEART RATE: 68 BPM | WEIGHT: 195 LBS | RESPIRATION RATE: 16 BRPM | BODY MASS INDEX: 34.55 KG/M2 | HEIGHT: 63 IN

## 2021-07-20 DIAGNOSIS — E78.2 MIXED HYPERLIPIDEMIA: ICD-10-CM

## 2021-07-20 DIAGNOSIS — I49.5 SSS (SICK SINUS SYNDROME) (HCC): ICD-10-CM

## 2021-07-20 DIAGNOSIS — I10 ESSENTIAL HYPERTENSION: Primary | ICD-10-CM

## 2021-07-20 DIAGNOSIS — I48.0 PAF (PAROXYSMAL ATRIAL FIBRILLATION) (HCC): ICD-10-CM

## 2021-07-20 PROCEDURE — G8754 DIAS BP LESS 90: HCPCS | Performed by: SPECIALIST

## 2021-07-20 PROCEDURE — G8417 CALC BMI ABV UP PARAM F/U: HCPCS | Performed by: SPECIALIST

## 2021-07-20 PROCEDURE — 99213 OFFICE O/P EST LOW 20 MIN: CPT | Performed by: SPECIALIST

## 2021-07-20 PROCEDURE — 1101F PT FALLS ASSESS-DOCD LE1/YR: CPT | Performed by: SPECIALIST

## 2021-07-20 PROCEDURE — 1090F PRES/ABSN URINE INCON ASSESS: CPT | Performed by: SPECIALIST

## 2021-07-20 PROCEDURE — G8427 DOCREV CUR MEDS BY ELIG CLIN: HCPCS | Performed by: SPECIALIST

## 2021-07-20 PROCEDURE — G8510 SCR DEP NEG, NO PLAN REQD: HCPCS | Performed by: SPECIALIST

## 2021-07-20 PROCEDURE — G8536 NO DOC ELDER MAL SCRN: HCPCS | Performed by: SPECIALIST

## 2021-07-20 PROCEDURE — G8399 PT W/DXA RESULTS DOCUMENT: HCPCS | Performed by: SPECIALIST

## 2021-07-20 PROCEDURE — G8753 SYS BP > OR = 140: HCPCS | Performed by: SPECIALIST

## 2021-07-20 NOTE — PROGRESS NOTES
HISTORY OF PRESENT ILLNESS  Vandana Whitaker is a 67 y.o. female     SUMMARY:   Problem List  Date Reviewed: 7/20/2021        Codes Class Noted    TIA (transient ischemic attack) ICD-10-CM: G45.9  ICD-9-CM: 435.9  4/22/2020        Bilateral carotid artery stenosis ICD-10-CM: I65.23  ICD-9-CM: 433.10, 433.30  4/22/2020        Transient ischemic attack involving left internal carotid artery ICD-10-CM: G45.1  ICD-9-CM: 435.8  4/22/2020        Pacemaker ICD-10-CM: Z95.0  ICD-9-CM: V45.01  10/4/2018        Severe obesity (BMI 35.0-39. 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 ICD-10-CM: I49.8  ICD-9-CM: 427.89  5/23/2016        APC (atrial premature contractions) ICD-10-CM: I49.1  ICD-9-CM: 427.61  5/23/2016        First degree AV block ICD-10-CM: I44.0  ICD-9-CM: 426.11  5/23/2016        SSS (sick sinus syndrome) ([facility] ICD-10-CM: I49.5  ICD-9-CM: 427.81  5/23/2016        Advanced care planning/counseling discussion ICD-10-CM: Z71.89  ICD-9-CM: V65.49  3/22/2016        Dyslipidemia ICD-10-CM: E78.5  ICD-9-CM: 272.4  6/12/2014        Bradycardia, sinus ICD-10-CM: R00.1  ICD-9-CM: 427.89  5/10/2013        Urge incontinence ICD-10-CM: N39.41  ICD-9-CM: 788.31  Unknown        S/P CABG x 4 ICD-10-CM: Z95.1  ICD-9-CM: V45.81  9/20/2012    Overview Signed 9/20/2012 10:28 AM by William mE     9/7/12--Dr. Radames Oscar--VASQUEZ-LAD,SVG D1/D2, SVG PDA-RCA             Mixed hyperlipidemia ICD-10-CM: E78.2  ICD-9-CM: 272.2  3/20/2012        Chronic ischemic heart disease ICD-10-CM: I25.9  ICD-9-CM: 414.9  3/20/2012        Postsurgical aortocoronary bypass status ICD-10-CM: Z95.1  ICD-9-CM: V45.81  3/20/2012        Coronary atherosclerosis of native coronary artery ICD-10-CM: I25.10  ICD-9-CM: 414.01  3/20/2012        Encounter for long-term (current) use of medications ICD-10-CM: Z79.899  ICD-9-CM: V58.69  7/28/2010        Cervical disc herniation ICD-10-CM: M50.20  ICD-9-CM: 722.0  Unknown        Hypertension ICD-10-CM: I10  ICD-9-CM: 401.9  Unknown        CAD (coronary artery disease) ICD-10-CM: I25.10  ICD-9-CM: 414.00  Unknown        Paroxysmal supraventricular tachycardia (HCC) ICD-10-CM: I47.1  ICD-9-CM: 427.0  Unknown              Current Outpatient Medications on File Prior to Visit   Medication Sig    Eliquis 5 mg tablet TAKE 1 TABLET BY MOUTH  TWICE DAILY    losartan-hydroCHLOROthiazide (HYZAAR) 50-12.5 mg per tablet TAKE 2 TABLETS BY MOUTH  DAILY    atorvastatin (LIPITOR) 40 mg tablet TAKE 1 TABLET BY MOUTH  DAILY    sotaloL (BETAPACE) 80 mg tablet TAKE 1 TABLET BY MOUTH  TWICE DAILY    aspirin delayed-release 81 mg tablet Take 1 Tab by mouth daily.  PSYLLIUM HUSK (DAILY FIBER PO) Take  by mouth daily.  FOLIC ACID/MULTIVIT-MIN/LUTEIN (CENTRUM SILVER PO) Take  by mouth daily.  omega-3 fatty acids-vitamin e (FISH OIL) 1,000 mg Cap Take  by mouth daily. No current facility-administered medications on file prior to visit. CARDIOLOGY STUDIES TO DATE:  [facility] DOI 06/09/16, on remote, NOT MRI Conditional    04/21/20 echo normal lvef, lvh, lae, mild mr, mild tr with pa pressure 44mm    Chief Complaint   Patient presents with    Heart Problem     6mo f/u; Patient denied cardiac symptoms     HPI :  She is doing great. She still working at [employer] and she walks every morning.   Her systolic is borderline today which is unusual for her so she is going to start checking it with the pharmacist a couple times a week at work. She is due to see her primary care next week for blood work including a lipid profile and she is keeping up with her pacemaker checks. CARDIAC ROS:   negative for chest pain, dyspnea, palpitations, syncope, orthopnea, paroxysmal nocturnal dyspnea, exertional chest pressure/discomfort, claudication, lower extremity edema    Family History   Problem Relation Age of Onset    Asthma Mother     Hypertension Father     Stroke Father     Alcohol abuse Father     Cancer Sister         breast    Diabetes Brother     Heart Disease Sister     Heart Disease Brother        Past Medical History:   Diagnosis Date    Acquired cyst of kidney     CAD (coronary artery disease)     Cervical disc herniation     Essential hypertension     Hypercholesterolemia     Hypertension     PAF (paroxysmal atrial fibrillation) ([facility] 6/9/2016    Paroxysmal supraventricular tachycardia ([facility]     Postsurgical aortocoronary bypass status 3/20/2012    S/P cardiac pacemaker procedure 6/9/2016 6/9/16 [employer] dual chamber pacemaker implant    Urge incontinence        GENERAL ROS:  A comprehensive review of systems was negative except for that written in the HPI.     Visit Vitals  BP (!) 140/83 (BP 1 Location: Left upper arm, BP Patient Position: Sitting, BP Cuff Size: Large adult)   Pulse 68 Comment: radial pulse check   Resp 16   Ht 5' 3\" (1.6 m)   Wt 195 lb (88.5 kg)   LMP 08/24/1993   SpO2 99%   BMI 34.54 kg/m²       Wt Readings from Last 3 Encounters:   07/20/21 195 lb (88.5 kg)   01/19/21 196 lb 3.2 oz (89 kg)   12/01/20 195 lb 12.8 oz (88.8 kg)            BP Readings from Last 3 Encounters:   07/20/21 (!) 140/83   01/19/21 138/79   12/01/20 130/80       PHYSICAL EXAM  General appearance: alert, cooperative, no distress, appears stated age  Neurologic: Alert and oriented X 3  Neck: supple, symmetrical, trachea midline, no adenopathy, no carotid bruit and no JVD  Lungs: clear to auscultation bilaterally  Heart: regular rate and rhythm, S1, S2 normal, no murmur, click, rub or gallop  Extremities: extremities normal, atraumatic, no cyanosis or edema    Lab Results   Component Value Date/Time    Cholesterol, total 154 04/22/2020 12:10 AM    Cholesterol, total 123 10/11/2019 12:04 PM    Cholesterol, total 127 04/05/2019 09:11 AM    Cholesterol, total 134 03/22/2018 11:34 AM    Cholesterol, total 132 09/21/2017 11:17 AM    HDL Cholesterol 57 04/22/2020 12:10 AM    HDL Cholesterol 44 10/11/2019 12:04 PM    HDL Cholesterol 44 04/05/2019 09:11 AM    HDL Cholesterol 44 03/22/2018 11:34 AM    HDL Cholesterol 43 09/21/2017 11:17 AM    LDL, calculated 83.4 04/22/2020 12:10 AM    LDL, calculated 69 10/11/2019 12:04 PM    LDL, calculated 71 04/05/2019 09:11 AM    LDL, calculated 75 03/22/2018 11:34 AM    LDL, calculated 75 09/21/2017 11:17 AM    Triglyceride 68 04/22/2020 12:10 AM    Triglyceride 49 10/11/2019 12:04 PM    Triglyceride 58 04/05/2019 09:11 AM    Triglyceride 73 03/22/2018 11:34 AM    Triglyceride 68 09/21/2017 11:17 AM    CHOL/HDL Ratio 2.7 04/22/2020 12:10 AM    CHOL/HDL Ratio 3.4 07/28/2010 11:07 AM    CHOL/HDL Ratio 3.5 08/31/2009 01:31 PM    CHOL/HDL Ratio 3.2 01/21/2009 02:19 PM     ASSESSMENT :      She is stable and asymptomatic, well compensated on a good medical regimen and needs no cardiac testing at this time. current treatment plan is effective, no change in therapy  lab results and schedule of future lab studies reviewed with patient  reviewed diet, exercise and weight control    Encounter Diagnoses   Name Primary?  Essential hypertension Yes    PAF (paroxysmal atrial fibrillation) (HCC)     SSS (sick sinus syndrome) (HCC)     Mixed hyperlipidemia      No orders of the defined types were placed in this encounter. Follow-up and Dispositions    · Return in about 6 months (around 1/20/2022).          Viola Rogers MD  7/20/2021  Please note that this dictation was completed with [employer], the computer voice recognition software. Quite often unanticipated grammatical, syntax, homophones, and other interpretive errors are inadvertently transcribed by the computer software. Please disregard these errors. Please excuse any errors that have escaped final proofreading. Thank you.

## 2021-07-20 NOTE — PROGRESS NOTES
1. Have you been to the ER, urgent care clinic since your last visit? Hospitalized since your last visit? No    2. Have you seen or consulted any other health care providers outside of the [address] [place] [address] since your last visit? Include any pap smears or colon screening.  No    Chief Complaint   Patient presents with    Heart Problem     6mo f/u; Patient denied cardiac symptoms

## 2021-08-03 PROBLEM — I25.10 CAD (CORONARY ARTERY DISEASE): Status: RESOLVED | Noted: 2021-08-03 | Resolved: 2021-08-03

## 2021-08-25 ENCOUNTER — OFFICE VISIT (OUTPATIENT)
Dept: FAMILY MEDICINE CLINIC | Age: 76
End: 2021-08-25
Payer: MEDICARE

## 2021-08-25 VITALS
BODY MASS INDEX: 34.66 KG/M2 | TEMPERATURE: 97.3 F | OXYGEN SATURATION: 97 % | HEIGHT: 63 IN | RESPIRATION RATE: 16 BRPM | DIASTOLIC BLOOD PRESSURE: 77 MMHG | HEART RATE: 63 BPM | WEIGHT: 195.6 LBS | SYSTOLIC BLOOD PRESSURE: 133 MMHG

## 2021-08-25 DIAGNOSIS — I49.5 SSS (SICK SINUS SYNDROME) (HCC): ICD-10-CM

## 2021-08-25 DIAGNOSIS — Z95.0 PACEMAKER: ICD-10-CM

## 2021-08-25 DIAGNOSIS — R73.03 PREDIABETES: ICD-10-CM

## 2021-08-25 DIAGNOSIS — I25.9 CHRONIC ISCHEMIC HEART DISEASE: ICD-10-CM

## 2021-08-25 DIAGNOSIS — Z95.1 S/P CABG X 4: ICD-10-CM

## 2021-08-25 DIAGNOSIS — Z79.899 ENCOUNTER FOR LONG-TERM (CURRENT) USE OF MEDICATIONS: ICD-10-CM

## 2021-08-25 DIAGNOSIS — I48.0 PAF (PAROXYSMAL ATRIAL FIBRILLATION) (HCC): ICD-10-CM

## 2021-08-25 DIAGNOSIS — Z00.00 MEDICARE ANNUAL WELLNESS VISIT, SUBSEQUENT: Primary | ICD-10-CM

## 2021-08-25 DIAGNOSIS — I25.810 CORONARY ARTERY DISEASE INVOLVING CORONARY BYPASS GRAFT OF NATIVE HEART WITHOUT ANGINA PECTORIS: ICD-10-CM

## 2021-08-25 DIAGNOSIS — E78.2 MIXED HYPERLIPIDEMIA: ICD-10-CM

## 2021-08-25 DIAGNOSIS — I10 ESSENTIAL HYPERTENSION: ICD-10-CM

## 2021-08-25 DIAGNOSIS — G45.9 TIA (TRANSIENT ISCHEMIC ATTACK): ICD-10-CM

## 2021-08-25 PROCEDURE — 1101F PT FALLS ASSESS-DOCD LE1/YR: CPT | Performed by: FAMILY MEDICINE

## 2021-08-25 PROCEDURE — G8752 SYS BP LESS 140: HCPCS | Performed by: FAMILY MEDICINE

## 2021-08-25 PROCEDURE — 99214 OFFICE O/P EST MOD 30 MIN: CPT | Performed by: FAMILY MEDICINE

## 2021-08-25 PROCEDURE — G0439 PPPS, SUBSEQ VISIT: HCPCS | Performed by: FAMILY MEDICINE

## 2021-08-25 PROCEDURE — G8754 DIAS BP LESS 90: HCPCS | Performed by: FAMILY MEDICINE

## 2021-08-25 PROCEDURE — G8427 DOCREV CUR MEDS BY ELIG CLIN: HCPCS | Performed by: FAMILY MEDICINE

## 2021-08-25 PROCEDURE — G8510 SCR DEP NEG, NO PLAN REQD: HCPCS | Performed by: FAMILY MEDICINE

## 2021-08-25 PROCEDURE — G8417 CALC BMI ABV UP PARAM F/U: HCPCS | Performed by: FAMILY MEDICINE

## 2021-08-25 PROCEDURE — 1090F PRES/ABSN URINE INCON ASSESS: CPT | Performed by: FAMILY MEDICINE

## 2021-08-25 PROCEDURE — G8399 PT W/DXA RESULTS DOCUMENT: HCPCS | Performed by: FAMILY MEDICINE

## 2021-08-25 PROCEDURE — G8536 NO DOC ELDER MAL SCRN: HCPCS | Performed by: FAMILY MEDICINE

## 2021-08-25 NOTE — PROGRESS NOTES
Dana Ng (: 1945) is a 67 y.o. female, established patient, here for evaluation of the following chief complaint(s): Annual Wellness Visit (Medicare)       ASSESSMENT/PLAN: Stable overall. Rechecking labs. Ct following with Cardiology and EP for much of her care. Has aged out of some of her cancer screenings and we will discuss potentially doing another colonoscopy depending on health in 2 years (2023)  Below is the assessment and plan developed based on review of pertinent history, physical exam, labs, studies, and medications. 1. Medicare annual wellness visit, subsequent  -     HEMOGLOBIN A1C WITH EAG; Future  -     LIPID PANEL; Future  -     METABOLIC PANEL, COMPREHENSIVE; Future  -     TSH 3RD GENERATION; Future  -     CBC W/O DIFF; Future  -     URINALYSIS W/ RFLX MICROSCOPIC; Future    2. TIA (transient ischemic attack) - resolved, on asa and statin  -     HEMOGLOBIN A1C WITH EAG; Future  -     LIPID PANEL; Future  -     METABOLIC PANEL, COMPREHENSIVE; Future  -     TSH 3RD GENERATION; Future  -     CBC W/O DIFF; Future  -     URINALYSIS W/ RFLX MICROSCOPIC; Future    3. Essential hypertension - controlled, ct current meds    4. Chronic ischemic heart disease  5. Mixed hyperlipidemia  6. Coronary artery disease involving coronary bypass graft of native heart without angina pectoris  7. S/P CABG x 4  - ct asa, statin. Seeing Cardiology  -     HEMOGLOBIN A1C WITH EAG; Future  -     LIPID PANEL; Future  -     METABOLIC PANEL, COMPREHENSIVE; Future  -     TSH 3RD GENERATION; Future  -     CBC W/O DIFF; Future  -     URINALYSIS W/ RFLX MICROSCOPIC; Future    8. PAF (paroxysmal atrial fibrillation) ([facility] [address]  9. SSS (sick sinus syndrome) ([facility] [address]  10. Pacemaker  - stable, on sotalol, eliquis. ct following with EP    11. Encounter for long-term (current) use of medications  -     HEMOGLOBIN A1C WITH EAG; Future  -     LIPID PANEL; Future  -     METABOLIC PANEL, COMPREHENSIVE;  Future  -     TSH 3RD GENERATION; Future  -     CBC W/O DIFF; Future  -     URINALYSIS W/ RFLX MICROSCOPIC; Future    12. Prediabetes - stable  -     HEMOGLOBIN A1C WITH EAG; Future    Return in about 6 months (around 2/25/2022), or if symptoms worsen or fail to improve. SUBJECTIVE/OBJECTIVE:  Doing well today. Following with Dr. Jennifer Miles for cardiac care and will see Dr. Jaden Torres in next few months. No new sx. Denies orthopnea or PND. Denies any chest pain, shortness of breath, dyspnea on exertion. Denies any stroke-like symptoms. Did have a TIA type presentation back in 4/2020 with some numbness and tingling in her right hand. HTN and HLD  Exercises regularly with walking. Compliant with diabetic diet. Avoids sodas and sweet teas. Avoids fast food. Limits carbs. Pt is a non smoker. Lab Results   Component Value Date/Time    Hemoglobin A1c 5.8 (H) 04/22/2020 12:10 AM    LDL, calculated 83.4 04/22/2020 12:10 AM    Creatinine 0.78 04/23/2020 04:00 AM      Lab Results   Component Value Date/Time    GFR est AA >60 04/23/2020 04:00 AM    GFR est non-AA >60 04/23/2020 04:00 AM      Lab Results   Component Value Date/Time    TSH 3.610 04/05/2019 09:11 AM       ROS  Gen - no fever/chills  Resp - no dyspnea or cough  CV - no chest pain or GARZA  Rest per HPI    Blood pressure 133/77, pulse 63, temperature 97.3 °F (36.3 °C), temperature source Oral, resp. rate 16, height 5' 3\" (1.6 m), weight 195 lb 9.6 oz (88.7 kg), last menstrual period 08/24/1993, SpO2 97 %.     Physical Exam  General appearance - alert, well appearing, and in no distress  Eyes -sclera anicteric  Neck - supple, no significant adenopathy, no thyromegaly  Chest - clear to auscultation, no wheezes, rales or rhonchi, symmetric air entry  Heart - normal rate, regular rhythm, normal S1, S2, no murmurs, rubs, clicks or gallops  Neurological - alert, oriented, normal speech, no focal findings or movement disorder noted  Extr - no edema  Psych - normal mood and affect      On this date 08/25/2021 I have spent 30 minutes reviewing previous notes, test results and face to face with the patient discussing the diagnosis and importance of compliance with the treatment plan as well as documenting on the day of the visit. An electronic signature was used to authenticate this note. -- Nallely Gomez MD       This is the Subsequent Medicare Annual Wellness Exam, performed 12 months or more after the Initial AWV or the last Subsequent AWV    I have reviewed the patient's medical history in detail and updated the computerized patient record. Assessment/Plan   Education and counseling provided:  Are appropriate based on today's review and evaluation    1. Medicare annual wellness visit, subsequent  -     HEMOGLOBIN A1C WITH EAG; Future  -     LIPID PANEL; Future  -     METABOLIC PANEL, COMPREHENSIVE; Future  -     TSH 3RD GENERATION; Future  -     CBC W/O DIFF; Future  -     URINALYSIS W/ RFLX MICROSCOPIC; Future  2. TIA (transient ischemic attack)  -     HEMOGLOBIN A1C WITH EAG; Future  -     LIPID PANEL; Future  -     METABOLIC PANEL, COMPREHENSIVE; Future  -     TSH 3RD GENERATION; Future  -     CBC W/O DIFF; Future  -     URINALYSIS W/ RFLX MICROSCOPIC; Future  3. Essential hypertension  -     HEMOGLOBIN A1C WITH EAG; Future  -     LIPID PANEL; Future  -     METABOLIC PANEL, COMPREHENSIVE; Future  -     TSH 3RD GENERATION; Future  -     CBC W/O DIFF; Future  -     URINALYSIS W/ RFLX MICROSCOPIC; Future  4. Chronic ischemic heart disease  -     HEMOGLOBIN A1C WITH EAG; Future  -     LIPID PANEL; Future  -     METABOLIC PANEL, COMPREHENSIVE; Future  -     TSH 3RD GENERATION; Future  -     CBC W/O DIFF; Future  -     URINALYSIS W/ RFLX MICROSCOPIC; Future  5. Mixed hyperlipidemia  -     HEMOGLOBIN A1C WITH EAG; Future  -     LIPID PANEL; Future  -     METABOLIC PANEL, COMPREHENSIVE; Future  -     TSH 3RD GENERATION; Future  -     CBC W/O DIFF;  Future  -     URINALYSIS W/ RFLX MICROSCOPIC; Future  6. Coronary artery disease involving coronary bypass graft of native heart without angina pectoris  -     HEMOGLOBIN A1C WITH EAG; Future  -     LIPID PANEL; Future  -     METABOLIC PANEL, COMPREHENSIVE; Future  -     TSH 3RD GENERATION; Future  -     CBC W/O DIFF; Future  -     URINALYSIS W/ RFLX MICROSCOPIC; Future  7. S/P CABG x 4  -     HEMOGLOBIN A1C WITH EAG; Future  -     LIPID PANEL; Future  -     METABOLIC PANEL, COMPREHENSIVE; Future  -     TSH 3RD GENERATION; Future  -     CBC W/O DIFF; Future  -     URINALYSIS W/ RFLX MICROSCOPIC; Future  8. PAF (paroxysmal atrial fibrillation) (HCC)  -     HEMOGLOBIN A1C WITH EAG; Future  -     LIPID PANEL; Future  -     METABOLIC PANEL, COMPREHENSIVE; Future  -     TSH 3RD GENERATION; Future  -     CBC W/O DIFF; Future  -     URINALYSIS W/ RFLX MICROSCOPIC; Future  9. SSS (sick sinus syndrome) (HCC)  -     HEMOGLOBIN A1C WITH EAG; Future  -     LIPID PANEL; Future  -     METABOLIC PANEL, COMPREHENSIVE; Future  -     TSH 3RD GENERATION; Future  -     CBC W/O DIFF; Future  -     URINALYSIS W/ RFLX MICROSCOPIC; Future  10. Pacemaker  -     HEMOGLOBIN A1C WITH EAG; Future  -     LIPID PANEL; Future  -     METABOLIC PANEL, COMPREHENSIVE; Future  -     TSH 3RD GENERATION; Future  -     CBC W/O DIFF; Future  -     URINALYSIS W/ RFLX MICROSCOPIC; Future  11. Encounter for long-term (current) use of medications  -     HEMOGLOBIN A1C WITH EAG; Future  -     LIPID PANEL; Future  -     METABOLIC PANEL, COMPREHENSIVE; Future  -     TSH 3RD GENERATION; Future  -     CBC W/O DIFF; Future  -     URINALYSIS W/ RFLX MICROSCOPIC; Future  12. Prediabetes  -     HEMOGLOBIN A1C WITH EAG; Future  -     LIPID PANEL; Future  -     METABOLIC PANEL, COMPREHENSIVE; Future  -     TSH 3RD GENERATION; Future  -     CBC W/O DIFF; Future  -     URINALYSIS W/ RFLX MICROSCOPIC;  Future       Depression Risk Factor Screening     3 most recent PHQ Screens 8/25/2021   Little interest or pleasure in doing things Not at all   Feeling down, depressed, irritable, or hopeless Not at all   Total Score PHQ 2 0       Alcohol Risk Screen    Do you average more than 1 drink per night or more than 7 drinks a week:  No    On any one occasion in the past three months have you have had more than 3 drinks containing alcohol:  No        Functional Ability and Level of Safety    Hearing: Hearing is good. Activities of Daily Living: The home contains: no safety equipment. Patient does total self care      Ambulation: with no difficulty     Fall Risk:  Fall Risk Assessment, last 12 mths 8/25/2021   Able to walk? Yes   Fall in past 12 months? 0   Do you feel unsteady?  0   Are you worried about falling 0      Abuse Screen:  Patient is not abused       Cognitive Screening    Has your family/caregiver stated any concerns about your memory: no     Cognitive Screening: Normal - Verbal Fluency Test    Health Maintenance Due     Health Maintenance Due   Topic Date Due    Hepatitis C Screening  Never done    Shingrix Vaccine Age 50> (1 of 2) Never done    Lipid Screen  04/22/2021       Patient Care Team   Patient Care Team:  Param Barbosa MD as PCP - General (Family Medicine)  Param Barbosa MD as PCP - [facility] Provider  Jennifer Michael MD as Physician (Cardiology)  Gasper Wyman NP as Nurse Practitioner (Nurse Practitioner)  Ashli Parker MD as Physician (Cardiology)    History     Patient Active Problem List   Diagnosis Code    Cervical disc herniation M50.20    Hypertension I10    Paroxysmal supraventricular tachycardia ([facility] [address] I47.1    Encounter for long-term (current) use of medications Z79.899    Mixed hyperlipidemia E78.2    Chronic ischemic heart disease I25.9    Postsurgical aortocoronary bypass status Z95.1    Coronary atherosclerosis of native coronary artery I25.10    S/P CABG x 4 Z95.1    Urge incontinence N39.41    Bradycardia, sinus R00.1    Dyslipidemia E78.5    Advanced care planning/counseling discussion Z71.89    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 9) E66.01    Pacemaker Z95.0    TIA (transient ischemic attack) G45.9    Bilateral carotid artery stenosis I65.23    Transient ischemic attack involving left internal carotid artery G45.1     Past Medical History:   Diagnosis Date    Acquired cyst of kidney     CAD (coronary artery disease)     Cervical disc herniation     Essential hypertension     Hypercholesterolemia     Hypertension     PAF (paroxysmal atrial fibrillation) ([facility] [address] 6/9/2016    Paroxysmal supraventricular tachycardia ([facility] [address]     Postsurgical aortocoronary bypass status 3/20/2012    S/P cardiac pacemaker procedure 6/9/2016 6/9/16 [place] dual chamber pacemaker implant    Urge incontinence       Past Surgical History:   Procedure Laterality Date    COLONOSCOPY N/A 1/5/2018    COLONOSCOPY performed by Felipa Hollis MD at \[facility]"" ENDOSCOPY    ENDOSCOPY, COLON, DIAGNOSTIC      due 2012 hx proctitis    HX CERVICAL DISKECTOMY      HX CORONARY ARTERY BYPASS GRAFT      HX HEENT      Thyroidectomy    HX ORTHOPAEDIC      benign thumb tumor    HX PACEMAKER Left 2016    HX UROLOGICAL  2011    stent for hydronephrosis    TX CABG, ARTERY-VEIN, FOUR  2007     Current Outpatient Medications   Medication Sig Dispense Refill    Eliquis 5 mg tablet TAKE 1 TABLET BY MOUTH  TWICE DAILY 60 Tab 11    losartan-hydroCHLOROthiazide (HYZAAR) 50-12.5 mg per tablet TAKE 2 TABLETS BY MOUTH  DAILY 180 Tab 3    atorvastatin (LIPITOR) 40 mg tablet TAKE 1 TABLET BY MOUTH  DAILY 90 Tab 3    sotaloL (BETAPACE) 80 mg tablet TAKE 1 TABLET BY MOUTH TWICE DAILY 180 Tab 3    aspirin delayed-release 81 mg tablet Take 1 Tab by mouth daily. 30 Tab 2    PSYLLIUM HUSK (DAILY FIBER PO) Take  by mouth daily.  FOLIC ACID/MULTIVIT-MIN/LUTEIN (CENTRUM SILVER PO) Take  by mouth daily.  omega-3 fatty acids-vitamin e (FISH OIL) 1,000 mg Cap Take  by mouth daily.        Allergies   Allergen Reactions    Ace Inhibitors Cough    Pcn [Penicillins] Anaphylaxis and Hives     Just got Rocephin in ED, no reactions    Niaspan [Niacin] Unable to Obtain       Family History   Problem Relation Age of Onset    Asthma Mother     Hypertension Father     Stroke Father     Alcohol abuse Father     Cancer Sister         breast    Diabetes Brother     Heart Disease Sister     Heart Disease Brother      Social History     Tobacco Use    Smoking status: Former Smoker     Packs/day: 0.50     Years: 10.00     Pack years: 5.00     Types: Cigarettes     Quit date: 2007     Years since quittin.2    Smokeless tobacco: Never Used   Substance Use Topics    Alcohol use: No     Alcohol/week: 0.0 standard drinks         Valerie Leahy MD

## 2021-08-25 NOTE — PROGRESS NOTES
Chief Complaint   Patient presents with   Walter Galla Annual Wellness Visit     Medicare   1. Have you been to the ER, urgent care clinic since your last visit? Hospitalized since your last visit? No    2. Have you seen or consulted any other health care providers outside of the [address] [place] [address] since your last visit? Include any pap smears or colon screening.  Yes cardio

## 2021-08-26 LAB
ALBUMIN SERPL-MCNC: 4.3 G/DL (ref 3.7–4.7)
ALBUMIN/GLOB SERPL: 1.3 {RATIO} (ref 1.2–2.2)
ALP SERPL-CCNC: 102 IU/L (ref 48–121)
ALT SERPL-CCNC: 17 IU/L (ref 0–32)
APPEARANCE UR: CLEAR
AST SERPL-CCNC: 20 IU/L (ref 0–40)
BACTERIA #/AREA URNS HPF: ABNORMAL /[HPF]
BILIRUB SERPL-MCNC: 0.6 MG/DL (ref 0–1.2)
BILIRUB UR QL STRIP: NEGATIVE
BUN SERPL-MCNC: 19 MG/DL (ref 8–27)
BUN/CREAT SERPL: 18 (ref 12–28)
CALCIUM SERPL-MCNC: 8.9 MG/DL (ref 8.7–10.3)
CASTS URNS QL MICRO: ABNORMAL /LPF
CHLORIDE SERPL-SCNC: 103 MMOL/L (ref 96–106)
CHOLEST SERPL-MCNC: 130 MG/DL (ref 100–199)
CO2 SERPL-SCNC: 26 MMOL/L (ref 20–29)
COLOR UR: YELLOW
CREAT SERPL-MCNC: 1.06 MG/DL (ref 0.57–1)
EPI CELLS #/AREA URNS HPF: ABNORMAL /HPF (ref 0–10)
ERYTHROCYTE [DISTWIDTH] IN BLOOD BY AUTOMATED COUNT: 13.7 % (ref 11.7–15.4)
EST. AVERAGE GLUCOSE BLD GHB EST-MCNC: 126 MG/DL
GLOBULIN SER CALC-MCNC: 3.2 G/DL (ref 1.5–4.5)
GLUCOSE SERPL-MCNC: 93 MG/DL (ref 65–99)
GLUCOSE UR QL: NEGATIVE
HBA1C MFR BLD: 6 % (ref 4.8–5.6)
HCT VFR BLD AUTO: 37.1 % (ref 34–46.6)
HDLC SERPL-MCNC: 45 MG/DL
HGB BLD-MCNC: 11.8 G/DL (ref 11.1–15.9)
HGB UR QL STRIP: NEGATIVE
INTERPRETATION: NORMAL
KETONES UR QL STRIP: NEGATIVE
LDLC SERPL CALC-MCNC: 72 MG/DL (ref 0–99)
LEUKOCYTE ESTERASE UR QL STRIP: ABNORMAL
MCH RBC QN AUTO: 26.2 PG (ref 26.6–33)
MCHC RBC AUTO-ENTMCNC: 31.8 G/DL (ref 31.5–35.7)
MCV RBC AUTO: 82 FL (ref 79–97)
MICRO URNS: ABNORMAL
NITRITE UR QL STRIP: POSITIVE
PH UR STRIP: 6.5 [PH] (ref 5–7.5)
PLATELET # BLD AUTO: 205 X10E3/UL (ref 150–450)
POTASSIUM SERPL-SCNC: 3.9 MMOL/L (ref 3.5–5.2)
PROT SERPL-MCNC: 7.5 G/DL (ref 6–8.5)
PROT UR QL STRIP: NEGATIVE
RBC # BLD AUTO: 4.51 X10E6/UL (ref 3.77–5.28)
RBC #/AREA URNS HPF: ABNORMAL /HPF (ref 0–2)
SODIUM SERPL-SCNC: 140 MMOL/L (ref 134–144)
SP GR UR: 1.02 (ref 1–1.03)
TRIGL SERPL-MCNC: 63 MG/DL (ref 0–149)
TSH SERPL DL<=0.005 MIU/L-ACNC: 4.12 UIU/ML (ref 0.45–4.5)
UROBILINOGEN UR STRIP-MCNC: 1 MG/DL (ref 0.2–1)
VLDLC SERPL CALC-MCNC: 13 MG/DL (ref 5–40)
WBC # BLD AUTO: 3.4 X10E3/UL (ref 3.4–10.8)
WBC #/AREA URNS HPF: ABNORMAL /HPF (ref 0–5)

## 2021-09-02 ENCOUNTER — OFFICE VISIT (OUTPATIENT)
Dept: CARDIOLOGY CLINIC | Age: 76
End: 2021-09-02
Payer: MEDICARE

## 2021-09-02 DIAGNOSIS — I49.5 SSS (SICK SINUS SYNDROME) (HCC): ICD-10-CM

## 2021-09-02 DIAGNOSIS — Z95.0 CARDIAC PACEMAKER IN SITU: Primary | ICD-10-CM

## 2021-09-02 PROCEDURE — 93296 REM INTERROG EVL PM/IDS: CPT | Performed by: INTERNAL MEDICINE

## 2021-09-02 PROCEDURE — 93294 REM INTERROG EVL PM/LDLS PM: CPT | Performed by: INTERNAL MEDICINE

## 2021-10-14 RX ORDER — ATORVASTATIN CALCIUM 40 MG/1
TABLET, FILM COATED ORAL
Qty: 90 TABLET | Refills: 3 | Status: SHIPPED | OUTPATIENT
Start: 2021-10-14 | End: 2022-01-21 | Stop reason: SDUPTHER

## 2021-10-14 RX ORDER — SOTALOL HYDROCHLORIDE 80 MG/1
TABLET ORAL
Qty: 180 TABLET | Refills: 3 | Status: SHIPPED | OUTPATIENT
Start: 2021-10-14 | End: 2022-01-21 | Stop reason: SDUPTHER

## 2021-12-09 ENCOUNTER — OFFICE VISIT (OUTPATIENT)
Dept: CARDIOLOGY CLINIC | Age: 76
End: 2021-12-09

## 2021-12-09 ENCOUNTER — OFFICE VISIT (OUTPATIENT)
Dept: CARDIOLOGY CLINIC | Age: 76
End: 2021-12-09
Payer: MEDICARE

## 2021-12-09 VITALS
SYSTOLIC BLOOD PRESSURE: 122 MMHG | BODY MASS INDEX: 33.82 KG/M2 | HEART RATE: 60 BPM | OXYGEN SATURATION: 98 % | DIASTOLIC BLOOD PRESSURE: 78 MMHG | HEIGHT: 63 IN | RESPIRATION RATE: 18 BRPM | WEIGHT: 190.9 LBS

## 2021-12-09 DIAGNOSIS — Z95.0 CARDIAC PACEMAKER IN SITU: Primary | ICD-10-CM

## 2021-12-09 DIAGNOSIS — I10 ESSENTIAL HYPERTENSION: ICD-10-CM

## 2021-12-09 DIAGNOSIS — I44.0 FIRST DEGREE AV BLOCK: ICD-10-CM

## 2021-12-09 DIAGNOSIS — I48.0 PAF (PAROXYSMAL ATRIAL FIBRILLATION) (HCC): ICD-10-CM

## 2021-12-09 DIAGNOSIS — I49.5 SSS (SICK SINUS SYNDROME) (HCC): Primary | ICD-10-CM

## 2021-12-09 DIAGNOSIS — I49.5 SSS (SICK SINUS SYNDROME) (HCC): ICD-10-CM

## 2021-12-09 DIAGNOSIS — Z95.0 CARDIAC PACEMAKER IN SITU: ICD-10-CM

## 2021-12-09 PROCEDURE — 1090F PRES/ABSN URINE INCON ASSESS: CPT | Performed by: NURSE PRACTITIONER

## 2021-12-09 PROCEDURE — G8536 NO DOC ELDER MAL SCRN: HCPCS | Performed by: NURSE PRACTITIONER

## 2021-12-09 PROCEDURE — G8399 PT W/DXA RESULTS DOCUMENT: HCPCS | Performed by: NURSE PRACTITIONER

## 2021-12-09 PROCEDURE — 1101F PT FALLS ASSESS-DOCD LE1/YR: CPT | Performed by: NURSE PRACTITIONER

## 2021-12-09 PROCEDURE — G8754 DIAS BP LESS 90: HCPCS | Performed by: NURSE PRACTITIONER

## 2021-12-09 PROCEDURE — G8432 DEP SCR NOT DOC, RNG: HCPCS | Performed by: NURSE PRACTITIONER

## 2021-12-09 PROCEDURE — 93280 PM DEVICE PROGR EVAL DUAL: CPT | Performed by: INTERNAL MEDICINE

## 2021-12-09 PROCEDURE — G8752 SYS BP LESS 140: HCPCS | Performed by: NURSE PRACTITIONER

## 2021-12-09 PROCEDURE — G8427 DOCREV CUR MEDS BY ELIG CLIN: HCPCS | Performed by: NURSE PRACTITIONER

## 2021-12-09 PROCEDURE — 99214 OFFICE O/P EST MOD 30 MIN: CPT | Performed by: NURSE PRACTITIONER

## 2021-12-09 PROCEDURE — G8417 CALC BMI ABV UP PARAM F/U: HCPCS | Performed by: NURSE PRACTITIONER

## 2021-12-09 PROCEDURE — 93000 ELECTROCARDIOGRAM COMPLETE: CPT | Performed by: NURSE PRACTITIONER

## 2021-12-09 NOTE — PROGRESS NOTES
1. Have you been to the ER, urgent care clinic since your last visit? Hospitalized since your last visit? No    2. Have you seen or consulted any other health care providers outside of the [address] [place] [address] since your last visit? Include any pap smears or colon screening.  No         Chief Complaint   Patient presents with    Follow-up     Pt denies cardiac symptoms

## 2021-12-09 NOTE — PROGRESS NOTES
ELECTROPHYSIOLOGY        Subjective:      Monet Alberto is a 67 y.o. female is here for EP follow up. The patient denies chest pain/ shortness of breath, orthopnea, PND, LE edema, palpitations, syncope, presyncope or fatigue. Monet Alberto  is on [address] [place] [address] Road, reports no melena, hematuria, or obvious signs of bleeding. No falls.        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 05/23/2016    APC (atrial premature contractions) 05/23/2016    First degree AV block 05/23/2016    SSS (sick sinus syndrome) ([address] 05/23/2016    Advanced care planning/counseling discussion 03/22/2016    Dyslipidemia 06/12/2014    Bradycardia, sinus 05/10/2013    Urge incontinence     S/P CABG x 4 09/20/2012    Mixed hyperlipidemia 03/20/2012    Chronic ischemic heart disease 03/20/2012    Postsurgical aortocoronary bypass status 03/20/2012    Coronary atherosclerosis of native coronary artery 03/20/2012    Encounter for long-term (current) use of medications 07/28/2010    Cervical disc herniation     Hypertension     Paroxysmal supraventricular tachycardia (HCC)       Reyes Escalante MD  Past Medical History:   Diagnosis Date    Acquired cyst of kidney     CAD (coronary artery disease)     Cervical disc herniation     Clotting disorder ([address]     Essential hypertension     Hypercholesterolemia     Hypertension     Long term current use of anticoagulant therapy     Pacemaker     PAF (paroxysmal atrial fibrillation) ([address] 6/9/2016    Paroxysmal supraventricular tachycardia [facility])     Postsurgical aortocoronary bypass status 3/20/2012    S/P cardiac pacemaker procedure 2016 [employer] dual chamber pacemaker implant    Stroke [facility])     Thyroid disease     Urge incontinence       Past Surgical History:   Procedure Laterality Date    COLONOSCOPY N/A 2018    COL[facility] at [facility] ENDOSCOPY    ENDOSCOPY, COLON, DIAGNOSTIC      due  hx proctitis    HX CERVICAL DISKECTOMY      HX CORONARY ARTERY BYPASS GRAFT      HX HEENT      Thyroidectomy    HX ORTHOPAEDIC      benign thumb tumor    HX PACEMAKER Left 2016    HX UROLOGICAL  2011    stent for hydronephrosis    ID CABG, ARTERY-VEIN, FOUR       Allergies   Allergen Reactions    Ace Inhibitors Cough    Pcn [Penicillins] Anaphylaxis and Hives     Just got Rocephin in ED, no reactions    Niaspan [Niacin] Unable to Obtain      Family History   Problem Relation Age of Onset    Asthma Mother     Hypertension Father     Stroke Father     Alcohol abuse Father     Cancer Sister         breast    Diabetes Brother     Heart Disease Sister     Heart Disease Brother     negative for cardiac disease  Social History     Socioeconomic History    Marital status:    Tobacco Use    Smoking status: Former Smoker     Packs/day: 0.50     Years: 10.00     Pack years: 5.00     Types: Cigarettes     Quit date: 2007     Years since quittin.5    Smokeless tobacco: Never Used   Vaping Use    Vaping Use: Never used   Substance and Sexual Activity    Alcohol use: No     Alcohol/week: 0.0 standard drinks    Drug use: No    Sexual activity: Never     Current Outpatient Medications   Medication Sig    sotaloL (BETAPACE) 80 mg tablet TAKE 1 TABLET BY MOUTH  TWICE DAILY    atorvastatin (LIPITOR) 40 mg tablet TAKE 1 TABLET BY MOUTH  DAILY    Eliquis 5 mg tablet TAKE 1 TABLET BY MOUTH  TWICE DAILY    losartan-hydroCHLOROthiazide (HYZAAR) 50-12.5 mg per tablet TAKE 2 TABLETS BY MOUTH  DAILY    aspirin delayed-release 81 mg tablet Take 1 Tab by mouth daily.  PSYLLIUM HUSK (DAILY FIBER PO) Take  by mouth daily.  FOLIC ACID/MULTIVIT-MIN/LUTEIN (CENTRUM SILVER PO) Take  by mouth daily.  omega-3 fatty acids-vitamin e (FISH OIL) 1,000 mg Cap Take  by mouth daily. No current facility-administered medications for this visit. Vitals:    21 1111   BP: 122/78   Pulse: 60   Resp: 18   SpO2: 98%   Weight: 190 lb 14.4 oz (86.6 kg)   Height: 5' 3\" (1.6 m)       I have reviewed the nurses notes, vitals, problem list, allergy list, medical history, family, social history and medications. Review of Symptoms:  11 systems reviewed, negative other than as stated in the HPI      Physical Exam:      General: Well developed, in no acute distress. HEENT: Eyes - PERRL  Heart:  Normal S1/S2 negative S3 or S4. Regular, no murmur  Respiratory: Clear bilaterally x 4, no wheezing or rales  Extremities:  No edema, no cyanosis. Musculoskeletal: No clubbing  Neuro: A&Ox3, speech clear  Skin: No visible rashes or lesions. Non diaphoretic.  No visible ulcers  Vascular: 2+ pulses symmetric in all extremities  Psych - judgement intact and orientation is wnl       Cardiographics    Ek21  V paced    Results for orders placed or performed during the hospital encounter of 20   EKG, 12 LEAD, INITIAL   Result Value Ref Range    Ventricular Rate 63 BPM    Atrial Rate 63 BPM    P-R Interval 244 ms    QRS Duration 92 ms    Q-T Interval 424 ms    QTC Calculation (Bezet) 433 ms    Calculated P Axis 47 degrees    Calculated R Axis 22 degrees    Calculated T Axis 31 degrees    Diagnosis       Sinus rhythm with 1st degree AV block  Possible Anterior infarct , age undetermined  When compared with ECG of 10-ABDELRAHMAN-2016 04:59,  premature atrial complexes are no longer present  Nonspecific T wave abnormality no longer evident in Anterolateral leads  Confirmed by Jeanette Fonseca P.ANGELA ([id number]) on 4/22/2020 10:00:18 AM     Results for orders placed or performed in visit on 05/23/16   CARDIAC HOLTER MONITOR, 24 HOURS    Narrative    ECG Monitor/24 hours, Complete    Reason for Holter Monitor  ECTOPIC ATRIAL RHYTHM    Heartbeat    Slowest 42  Average 81  Fastest  138        Results:   Underlying Rhythm: Normal sinus rhythm      Atrial Arrhythmias: premature atrial contractions; occasional,  paroxysmal atrial fibrillation, paroxysmal atrial flutter and  severe bradycardia            AV Conduction: normal    Ventricular Arrhythmias: premature ventricular contractions;  occasional     ST Segment Analysis:normal     Symptom Correlation:  None reported    Comment:   Sinus rhythm with\ profound daytime bradycardia in the 40s and  pafib/pafl with rvr. Clinical correlation advised. Dorita Asher MD, [facility], [facility]            Lab Results   Component Value Date/Time    WBC 3.4 08/25/2021 09:58 AM    HGB 11.8 08/25/2021 09:58 AM    HCT 37.1 08/25/2021 09:58 AM    PLATELET 467 90/79/9627 09:58 AM    MCV 82 08/25/2021 09:58 AM      Lab Results   Component Value Date/Time    Sodium 140 08/25/2021 09:58 AM    Potassium 3.9 08/25/2021 09:58 AM    Chloride 103 08/25/2021 09:58 AM    CO2 26 08/25/2021 09:58 AM    Anion gap 8 04/23/2020 04:00 AM    Glucose 93 08/25/2021 09:58 AM    BUN 19 08/25/2021 09:58 AM    Creatinine 1.06 (H) 08/25/2021 09:58 AM    BUN/Creatinine ratio 18 08/25/2021 09:58 AM    GFR est AA 59 (L) 08/25/2021 09:58 AM    GFR est non-AA 51 (L) 08/25/2021 09:58 AM    Calcium 8.9 08/25/2021 09:58 AM    Bilirubin, total 0.6 08/25/2021 09:58 AM    Alk. phosphatase 102 08/25/2021 09:58 AM    Protein, total 7.5 08/25/2021 09:58 AM    Albumin 4.3 08/25/2021 09:58 AM    Globulin 4.5 (H) 04/22/2020 12:10 AM    A-G Ratio 1.3 08/25/2021 09:58 AM    ALT (SGPT) 17 08/25/2021 09:58 AM      Lab Results   Component Value Date/Time    TSH 4.120 08/25/2021 09:58 AM           Assessment:           ICD-10-CM ICD-9-CM    1.  SSS (sick sinus syndrome) ([facility])  I49.5 427.81 AMB POC EKG ROUTINE W/ 12 LEADS, INTER & REP   2. PAF (paroxysmal atrial fibrillation) ([facility])  I48.0 427.31    3. First degree AV block  I44.0 426.11    4. Cardiac pacemaker in situ  Z95.0 V45.01    5. Essential hypertension  I10 401.9    6. BMI 34.0-34.9,adult  Z68.34 V85.34      Orders Placed This Encounter    AMB POC EKG ROUTINE W/ 12 LEADS, INTER & REP     Order Specific Question:   Reason for Exam:     Answer:   ROUTINE        Plan:     Edgar Porter is here for annual follow up and device interrogation. She is A paced 74% for sick sinus. She is v paced 11% for mobitz ii heart block. Battery remaining is >3 years. No VHR. She is oac for PAF (ATR <1% of the time). During this visit,  the patient and I had a comprehensive discussion of device management using principles of shared decision making. We reviewed device therapy, including the potential risks and benefits of device management. These risks include death, myocardial infarction, stroke, cardiac perforation, vascular injury, injury, pacing induced cardiomyopathy, inappropriate shocks (defibrillator) and other less severe complications. The patient demonstrated a clear understanding of these issues during out discussion. Our plans, determined together after thorough consideration, are outlined else where in this note. Enrolled in remote monitoring and pt will return in 1 year. Addressed all patient questions and concerns at this visit. Continue medical management for hyperlipidemia. Discussed side effects, efficacy and good medication compliance with pt re: statin. Thank you for allowing me to participate in Edgar Porter 's care.       Ankush Gomes NP

## 2022-01-01 DIAGNOSIS — I10 ESSENTIAL HYPERTENSION: ICD-10-CM

## 2022-01-02 RX ORDER — LOSARTAN POTASSIUM AND HYDROCHLOROTHIAZIDE 12.5; 5 MG/1; MG/1
TABLET ORAL
Qty: 180 TABLET | Refills: 3 | Status: SHIPPED | OUTPATIENT
Start: 2022-01-02 | End: 2022-04-22 | Stop reason: SDUPTHER

## 2022-01-16 RX ORDER — APIXABAN 5 MG/1
TABLET, FILM COATED ORAL
Qty: 60 TABLET | Refills: 5 | Status: SHIPPED | OUTPATIENT
Start: 2022-01-16 | End: 2022-04-22 | Stop reason: SDUPTHER

## 2022-01-20 ENCOUNTER — OFFICE VISIT (OUTPATIENT)
Dept: CARDIOLOGY CLINIC | Age: 77
End: 2022-01-20
Payer: MEDICARE

## 2022-01-20 VITALS
SYSTOLIC BLOOD PRESSURE: 138 MMHG | WEIGHT: 192 LBS | OXYGEN SATURATION: 99 % | RESPIRATION RATE: 19 BRPM | BODY MASS INDEX: 34.02 KG/M2 | HEIGHT: 63 IN | TEMPERATURE: 97.8 F | DIASTOLIC BLOOD PRESSURE: 80 MMHG | HEART RATE: 60 BPM

## 2022-01-20 DIAGNOSIS — I10 PRIMARY HYPERTENSION: ICD-10-CM

## 2022-01-20 DIAGNOSIS — I49.5 SSS (SICK SINUS SYNDROME) (HCC): ICD-10-CM

## 2022-01-20 DIAGNOSIS — I48.0 PAF (PAROXYSMAL ATRIAL FIBRILLATION) (HCC): Primary | ICD-10-CM

## 2022-01-20 DIAGNOSIS — Z95.0 CARDIAC PACEMAKER IN SITU: ICD-10-CM

## 2022-01-20 PROCEDURE — G8510 SCR DEP NEG, NO PLAN REQD: HCPCS | Performed by: SPECIALIST

## 2022-01-20 PROCEDURE — 1090F PRES/ABSN URINE INCON ASSESS: CPT | Performed by: SPECIALIST

## 2022-01-20 PROCEDURE — 99213 OFFICE O/P EST LOW 20 MIN: CPT | Performed by: SPECIALIST

## 2022-01-20 PROCEDURE — G8399 PT W/DXA RESULTS DOCUMENT: HCPCS | Performed by: SPECIALIST

## 2022-01-20 PROCEDURE — G8752 SYS BP LESS 140: HCPCS | Performed by: SPECIALIST

## 2022-01-20 PROCEDURE — G8417 CALC BMI ABV UP PARAM F/U: HCPCS | Performed by: SPECIALIST

## 2022-01-20 PROCEDURE — G8754 DIAS BP LESS 90: HCPCS | Performed by: SPECIALIST

## 2022-01-20 PROCEDURE — G8427 DOCREV CUR MEDS BY ELIG CLIN: HCPCS | Performed by: SPECIALIST

## 2022-01-20 PROCEDURE — 1101F PT FALLS ASSESS-DOCD LE1/YR: CPT | Performed by: SPECIALIST

## 2022-01-20 PROCEDURE — G8536 NO DOC ELDER MAL SCRN: HCPCS | Performed by: SPECIALIST

## 2022-01-20 NOTE — PROGRESS NOTES
HISTORY OF PRESENT ILLNESS  Susie Tavares is a 67 y.o. female     SUMMARY:   Problem List  Date Reviewed: 1/20/2022          Codes Class Noted    TIA (transient ischemic attack) ICD-10-CM: G45.9  ICD-9-CM: 435.9  4/22/2020        Bilateral carotid artery stenosis ICD-10-CM: I65.23  ICD-9-CM: 433.10, 433.30  4/22/2020        Transient ischemic attack involving left internal carotid artery ICD-10-CM: G45.1  ICD-9-CM: 435.8  4/22/2020        Pacemaker ICD-10-CM: Z95.0  ICD-9-CM: V45.01  10/4/2018        Severe obesity (BMI 35.0-39. 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 ICD-10-CM: I49.8  ICD-9-CM: 427.89  5/23/2016        APC (atrial premature contractions) ICD-10-CM: I49.1  ICD-9-CM: 427.61  5/23/2016        First degree AV block ICD-10-CM: I44.0  ICD-9-CM: 426.11  5/23/2016        SSS (sick sinus syndrome) ([facility] ICD-10-CM: I49.5  ICD-9-CM: 427.81  5/23/2016        Advanced care planning/counseling discussion ICD-10-CM: Z71.89  ICD-9-CM: V65.49  3/22/2016        Dyslipidemia ICD-10-CM: E78.5  ICD-9-CM: 272.4  6/12/2014        Bradycardia, sinus ICD-10-CM: R00.1  ICD-9-CM: 427.89  5/10/2013        Urge incontinence ICD-10-CM: N39.41  ICD-9-CM: 788.31  Unknown        S/P CABG x 4 ICD-10-CM: Z95.1  ICD-9-CM: V45.81  9/20/2012    Overview Signed 9/20/2012 10:28 AM by Tamika Lombardo     9/7/12--Dr. Radames Oscar--VASQUEZ-LAD,SVG D1/D2, SVG PDA-RCA             Mixed hyperlipidemia ICD-10-CM: E78.2  ICD-9-CM: 272.2  3/20/2012        Chronic ischemic heart disease ICD-10-CM: I25.9  ICD-9-CM: 414.9  3/20/2012        Postsurgical aortocoronary bypass status ICD-10-CM: Z95.1  ICD-9-CM: V45.81  3/20/2012        Coronary atherosclerosis of native coronary artery ICD-10-CM: I25.10  ICD-9-CM: 414.01  3/20/2012        Encounter for long-term (current) use of medications ICD-10-CM: Z79.899  ICD-9-CM: V58.69  7/28/2010        Cervical disc herniation ICD-10-CM: M50.20  ICD-9-CM: 722.0  Unknown        Hypertension ICD-10-CM: I10  ICD-9-CM: 401.9  Unknown        Paroxysmal supraventricular tachycardia ([facility] ICD-10-CM: I47.1  ICD-9-CM: 427.0  Unknown              Current Outpatient Medications on File Prior to Visit   Medication Sig    Eliquis 5 mg tablet TAKE 1 TABLET BY MOUTH TWICE A DAY    losartan-hydroCHLOROthiazide (HYZAAR) 50-12.5 mg per tablet TAKE 2 TABLETS BY MOUTH  DAILY    sotaloL (BETAPACE) 80 mg tablet TAKE 1 TABLET BY MOUTH  TWICE DAILY    atorvastatin (LIPITOR) 40 mg tablet TAKE 1 TABLET BY MOUTH  DAILY    aspirin delayed-release 81 mg tablet Take 1 Tab by mouth daily.  PSYLLIUM HUSK (DAILY FIBER PO) Take  by mouth daily.  FOLIC ACID/MULTIVIT-MIN/LUTEIN (CENTRUM SILVER PO) Take  by mouth daily.  omega-3 fatty acids-vitamin e (FISH OIL) 1,000 mg Cap Take  by mouth daily. No current facility-administered medications on file prior to visit. CARDIOLOGY STUDIES [place] DATE:  [facility] [place] on remote, NOT MRI Conditional    04/21/20 echo normal lvef, lvh, lae, mild mr, mild tr with pa pressure 44mm    Chief Complaint   Patient presents with    Follow-up     6 mo appt    Irregular Heart Beat     HPI :  She continues to do remarkably well. She still working at [employer] on [employer] and gets plenty of exercise there plus gets outside and walk some weather permitting with no worrisome symptoms or problems with her medications.   Blood pressure is well controlled and she is keeping up with her pacemaker checks. CARDIAC ROS:   negative for chest pain, dyspnea, palpitations, syncope, orthopnea, paroxysmal nocturnal dyspnea, exertional chest pressure/discomfort, claudication, lower extremity edema    Family History   Problem Relation Age of Onset    Asthma Mother     Hypertension Father     Stroke Father     Alcohol abuse Father     Cancer Sister         breast    Diabetes Brother     Heart Disease Sister     Heart Disease Brother        Past Medical History:   Diagnosis Date    Acquired cyst of kidney     CAD (coronary artery disease)     Cervical disc herniation     Clotting disorder ([address]     Essential hypertension     Hypercholesterolemia     Hypertension     Long term current use of anticoagulant therapy     Pacemaker     PAF (paroxysmal atrial fibrillation) ([address] 6/9/2016    Paroxysmal supraventricular tachycardia ([address]     Postsurgical aortocoronary bypass status 3/20/2012    S/P cardiac pacemaker procedure 6/9/2016 6/9/16 [employer] dual chamber pacemaker implant    Stroke ([facility]     Thyroid disease     Urge incontinence        GENERAL ROS:  A comprehensive review of systems was negative except for that written in the HPI.     Visit Vitals  /80 (BP 1 Location: Left upper arm, BP Patient Position: Sitting, BP Cuff Size: Large adult)   Pulse 60   Temp 97.8 °F (36.6 °C) (Temporal)   Resp 19   Ht 5' 3\" (1.6 m)   Wt 192 lb (87.1 kg)   LMP 08/24/1993   SpO2 99%   BMI 34.01 kg/m²       Wt Readings from Last 3 Encounters:   01/20/22 192 lb (87.1 kg)   12/09/21 190 lb 14.4 oz (86.6 kg)   08/25/21 195 lb 9.6 oz (88.7 kg)            BP Readings from Last 3 Encounters:   01/20/22 138/80   12/09/21 122/78   08/25/21 133/77       PHYSICAL EXAM  General appearance: alert, cooperative, no distress, appears stated age  Neurologic: Alert and oriented X 3  Neck: supple, symmetrical, trachea midline, no adenopathy, no carotid bruit and no JVD  Lungs: clear to auscultation bilaterally  Heart: regular rate and rhythm, S1, S2 normal, no murmur, click, rub or gallop  Extremities: extremities normal, atraumatic, no cyanosis or edema    Lab Results   Component Value Date/Time    Cholesterol, total 130 08/25/2021 09:58 AM    Cholesterol, total 154 04/22/2020 12:10 AM    Cholesterol, total 123 10/11/2019 12:04 PM    Cholesterol, total 127 04/05/2019 09:11 AM    Cholesterol, total 134 03/22/2018 11:34 AM    HDL Cholesterol 45 08/25/2021 09:58 AM    HDL Cholesterol 57 04/22/2020 12:10 AM    HDL Cholesterol 44 10/11/2019 12:04 PM    HDL Cholesterol 44 04/05/2019 09:11 AM    HDL Cholesterol 44 03/22/2018 11:34 AM    LDL, calculated 72 08/25/2021 09:58 AM    LDL, calculated 83.4 04/22/2020 12:10 AM    LDL, calculated 69 10/11/2019 12:04 PM    LDL, calculated 71 04/05/2019 09:11 AM    LDL, calculated 75 03/22/2018 11:34 AM    LDL, calculated 75 09/21/2017 11:17 AM    Triglyceride 63 08/25/2021 09:58 AM    Triglyceride 68 04/22/2020 12:10 AM    Triglyceride 49 10/11/2019 12:04 PM    Triglyceride 58 04/05/2019 09:11 AM    Triglyceride 73 03/22/2018 11:34 AM    CHOL/HDL Ratio 2.7 04/22/2020 12:10 AM    CHOL/HDL Ratio 3.4 07/28/2010 11:07 AM    CHOL/HDL Ratio 3.5 08/31/2009 01:31 PM    CHOL/HDL Ratio 3.2 01/21/2009 02:19 PM     ASSESSMENT :      She is stable and asymptomatic, well compensated on a good medical regimen and needs no cardiac testing at this time. current treatment plan is effective, no change in therapy  lab results and schedule of future lab studies reviewed with patient  reviewed diet, exercise and weight control    Encounter Diagnoses   Name Primary?  PAF (paroxysmal atrial fibrillation) (HCC) Yes    SSS (sick sinus syndrome) ([facility]     Primary hypertension     Cardiac pacemaker in situ      No orders of the defined types were placed in this encounter. Follow-up and Dispositions    · Return in about 6 months (around 7/20/2022). Zafar Waddell MD  1/20/2022  Please note that this dictation was completed with [employer], the computer voice recognition software. Quite often unanticipated grammatical, syntax, homophones, and other interpretive errors are inadvertently transcribed by the computer software. Please disregard these errors. Please excuse any errors that have escaped final proofreading. Thank you.

## 2022-01-20 NOTE — PROGRESS NOTES
1. Have you been to the ER, urgent care clinic since your last visit? Hospitalized since your last visit? No    2. Have you seen or consulted any other health care providers outside of the [address] [place] [address] since your last visit? Include any pap smears or colon screening.  No    Chief Complaint   Patient presents with    Follow-up     6 mo appt    Irregular Heart Beat

## 2022-01-21 ENCOUNTER — TELEPHONE (OUTPATIENT)
Dept: CARDIOLOGY CLINIC | Age: 77
End: 2022-01-21

## 2022-01-21 DIAGNOSIS — E78.2 MIXED HYPERLIPIDEMIA: Primary | ICD-10-CM

## 2022-01-21 DIAGNOSIS — I10 PRIMARY HYPERTENSION: ICD-10-CM

## 2022-01-21 RX ORDER — ATORVASTATIN CALCIUM 40 MG/1
40 TABLET, FILM COATED ORAL DAILY
Qty: 90 TABLET | Refills: 3 | Status: SHIPPED | OUTPATIENT
Start: 2022-01-21

## 2022-01-21 RX ORDER — SOTALOL HYDROCHLORIDE 80 MG/1
80 TABLET ORAL 2 TIMES DAILY
Qty: 180 TABLET | Refills: 3 | Status: SHIPPED | OUTPATIENT
Start: 2022-01-21

## 2022-01-21 NOTE — TELEPHONE ENCOUNTER
Requested Prescriptions     Pending Prescriptions Disp Refills    sotaloL (BETAPACE) 80 mg tablet 180 Tablet 3     Sig: Take 1 Tablet by mouth two (2) times a day.  atorvastatin (LIPITOR) 40 mg tablet 90 Tablet 3     Sig: Take 1 Tablet by mouth daily. Patient switched insurance to [facility] [employer].

## 2022-02-25 ENCOUNTER — OFFICE VISIT (OUTPATIENT)
Dept: FAMILY MEDICINE CLINIC | Age: 77
End: 2022-02-25
Payer: MEDICARE

## 2022-02-25 VITALS
HEART RATE: 63 BPM | DIASTOLIC BLOOD PRESSURE: 65 MMHG | TEMPERATURE: 97.5 F | BODY MASS INDEX: 34.23 KG/M2 | SYSTOLIC BLOOD PRESSURE: 133 MMHG | HEIGHT: 63 IN | WEIGHT: 193.2 LBS | RESPIRATION RATE: 18 BRPM | OXYGEN SATURATION: 96 %

## 2022-02-25 DIAGNOSIS — I25.9 CHRONIC ISCHEMIC HEART DISEASE: ICD-10-CM

## 2022-02-25 DIAGNOSIS — Z79.899 ENCOUNTER FOR LONG-TERM (CURRENT) USE OF MEDICATIONS: ICD-10-CM

## 2022-02-25 DIAGNOSIS — Z95.1 S/P CABG X 4: ICD-10-CM

## 2022-02-25 DIAGNOSIS — E78.2 MIXED HYPERLIPIDEMIA: ICD-10-CM

## 2022-02-25 DIAGNOSIS — M19.012 PRIMARY OSTEOARTHRITIS OF LEFT SHOULDER: ICD-10-CM

## 2022-02-25 DIAGNOSIS — R73.03 PREDIABETES: ICD-10-CM

## 2022-02-25 DIAGNOSIS — M25.512 ACUTE PAIN OF LEFT SHOULDER: Primary | ICD-10-CM

## 2022-02-25 DIAGNOSIS — I48.0 PAF (PAROXYSMAL ATRIAL FIBRILLATION) (HCC): ICD-10-CM

## 2022-02-25 DIAGNOSIS — I10 ESSENTIAL HYPERTENSION: ICD-10-CM

## 2022-02-25 DIAGNOSIS — G45.9 TIA (TRANSIENT ISCHEMIC ATTACK): ICD-10-CM

## 2022-02-25 DIAGNOSIS — I25.810 CORONARY ARTERY DISEASE INVOLVING CORONARY BYPASS GRAFT OF NATIVE HEART WITHOUT ANGINA PECTORIS: ICD-10-CM

## 2022-02-25 DIAGNOSIS — Z11.59 NEED FOR HEPATITIS C SCREENING TEST: ICD-10-CM

## 2022-02-25 PROCEDURE — G8536 NO DOC ELDER MAL SCRN: HCPCS | Performed by: FAMILY MEDICINE

## 2022-02-25 PROCEDURE — G8417 CALC BMI ABV UP PARAM F/U: HCPCS | Performed by: FAMILY MEDICINE

## 2022-02-25 PROCEDURE — G8754 DIAS BP LESS 90: HCPCS | Performed by: FAMILY MEDICINE

## 2022-02-25 PROCEDURE — G8510 SCR DEP NEG, NO PLAN REQD: HCPCS | Performed by: FAMILY MEDICINE

## 2022-02-25 PROCEDURE — 1090F PRES/ABSN URINE INCON ASSESS: CPT | Performed by: FAMILY MEDICINE

## 2022-02-25 PROCEDURE — 99214 OFFICE O/P EST MOD 30 MIN: CPT | Performed by: FAMILY MEDICINE

## 2022-02-25 PROCEDURE — 1101F PT FALLS ASSESS-DOCD LE1/YR: CPT | Performed by: FAMILY MEDICINE

## 2022-02-25 PROCEDURE — G8752 SYS BP LESS 140: HCPCS | Performed by: FAMILY MEDICINE

## 2022-02-25 PROCEDURE — G8399 PT W/DXA RESULTS DOCUMENT: HCPCS | Performed by: FAMILY MEDICINE

## 2022-02-25 PROCEDURE — G8427 DOCREV CUR MEDS BY ELIG CLIN: HCPCS | Performed by: FAMILY MEDICINE

## 2022-02-25 RX ORDER — DICLOFENAC SODIUM 10 MG/G
2 GEL TOPICAL 4 TIMES DAILY
Qty: 100 G | Refills: 0 | Status: SHIPPED | OUTPATIENT
Start: 2022-02-25 | End: 2022-03-11

## 2022-02-25 NOTE — PROGRESS NOTES
Chief Complaint   Patient presents with    Hypertension     Follow-up   1. Have you been to the ER, urgent care clinic since your last visit? Hospitalized since your last visit? No    2. Have you seen or consulted any other health care providers outside of the [address] [place] [address] since your last visit? Include any pap smears or colon screening.  No     Discuss left arm pain

## 2022-02-25 NOTE — PROGRESS NOTES
Dane Brown (: 1945) is a 67 y.o. female, established patient, here for evaluation of the following chief complaint(s):  Hypertension (Follow-up)       ASSESSMENT/PLAN:  Doing well today. Checking labs. Ongoing issues with shoulder pain so getting xrays and sending for PT. Voltaren gel prn. Hx of TIA and good control of RFs including HTN, HLD. Following with Cardiology. Below is the assessment and plan developed based on review of pertinent history, physical exam, labs, studies, and medications. 1. Acute pain of left shoulder  -     diclofenac (VOLTAREN) 1 % gel; Apply 2 g to affected area four (4) times daily. , Normal, Disp-100 g, R-0  -     REFERRAL TO PHYSICAL THERAPY  -     XR SHOULDER LT AP/LAT MIN 2 V; Future  2. TIA (transient ischemic attack)  -     LIPID PANEL; Future  3. Essential hypertension  -     METABOLIC PANEL, COMPREHENSIVE; Future  4. Chronic ischemic heart disease  -     HEMOGLOBIN A1C WITH EAG; Future  -     LIPID PANEL; Future  -     METABOLIC PANEL, COMPREHENSIVE; Future  5. Mixed hyperlipidemia  -     HEMOGLOBIN A1C WITH EAG; Future  -     LIPID PANEL; Future  -     METABOLIC PANEL, COMPREHENSIVE; Future  6. Coronary artery disease involving coronary bypass graft of native heart without angina pectoris  -     LIPID PANEL; Future  7. S/P CABG x 4  -     LIPID PANEL; Future  8. PAF (paroxysmal atrial fibrillation) ([facility]  9. Encounter for long-term (current) use of medications  -     HEMOGLOBIN A1C WITH EAG; Future  -     LIPID PANEL; Future  -     METABOLIC PANEL, COMPREHENSIVE; Future  10. Prediabetes  -     HEMOGLOBIN A1C WITH EAG; Future  11. Primary osteoarthritis of left shoulder  -     XR SHOULDER LT AP/LAT MIN 2 V; Future  12. Need for hepatitis C screening test  -     HEPATITIS C AB; Future      Return in about 4 weeks (around 3/25/2022), or if symptoms worsen or fail to improve.       SUBJECTIVE/OBJECTIVE:    L shoulder pain - no injury, sx x 1 month, tried OTC cream, night pain. Following with Dr. Mouna Payne for general cardiac care and Dr. Arleen Oneil for EP. No new sx. Denies orthopnea or PND. Denies any chest pain, shortness of breath, dyspnea on exertion. Denies any stroke-like symptoms. Did have a TIA type presentation back in 4/2020 with some numbness and tingling in her right hand. Hyperlipidemia & HTN  Pt is doing well on current meds with no medication side effects noted  No new myalgias, no joint pains, no weakness  Exercises regularly with walking. Compliant with diabetic diet. Avoids sodas and sweet teas. Avoids fast food. Limits carbs. Pt is a non smoker. Lab Results   Component Value Date/Time    Cholesterol, total 123 02/25/2022 12:11 PM    HDL Cholesterol 41 02/25/2022 12:11 PM    LDL, calculated 69 02/25/2022 12:11 PM    LDL, calculated 83.4 04/22/2020 12:10 AM    Triglyceride 60 02/25/2022 12:11 PM     ROS  Gen - no fever/chills  Resp - no dyspnea or cough  CV - no chest pain or GARZA  Rest per HPI    Blood pressure 133/65, pulse 63, temperature 97.5 °F (36.4 °C), temperature source Temporal, resp. rate 18, height 5' 3\" (1.6 m), weight 193 lb 3.2 oz (87.6 kg), last menstrual period 08/24/1993, SpO2 96 %.     Physical Exam  General appearance - alert, well appearing, and in no distress  Eyes -sclera anicteric  Neck - supple, no significant adenopathy, no thyromegaly  Chest - clear to auscultation, no wheezes, rales or rhonchi, symmetric air entry  Heart - normal rate, regular rhythm, normal S1, S2, no murmurs, rubs, clicks or gallops  Msk - left shoulder with slightly restricted ROM, no ttp  Neurological - alert, oriented, normal speech, no focal findings or movement disorder noted  Extr - no edema  Psych - normal mood and affect      On this date 02/25/2022 I have spent 30 minutes reviewing previous notes, test results and face to face with the patient discussing the diagnosis and importance of compliance with the treatment plan as well as documenting on the day of the visit. An electronic signature was used to authenticate this note.   -- Julio C Simon MD

## 2022-02-25 NOTE — PATIENT INSTRUCTIONS
Shoulder Arthritis: Exercises  Introduction  Here are some examples of exercises for you to try. The exercises may be suggested for a condition or for rehabilitation. Start each exercise slowly. Ease off the exercises if you start to have pain. You will be told when to start these exercises and which ones will work best for you. How to do the exercises  Shoulder flexion (lying down)    To make a wand for this exercise, use a piece of PVC pipe or a broom handle with the broom removed. Make the wand about a foot wider than your shoulders. 1. Lie on your back, holding a wand with both hands. Your palms should face down as you hold the wand. 2. Keeping your elbows straight, slowly raise your arms over your head. Raise them until you feel a stretch in your shoulders, upper back, and chest.  3. Hold for 15 to 30 seconds. 4. Repeat 2 to 4 times. Shoulder rotation (lying down)    To make a wand for this exercise, use a piece of PVC pipe or a broom handle with the broom removed. Make the wand about a foot wider than your shoulders. 1. Lie on your back. Hold a wand with both hands with your elbows bent and palms up. 2. Keep your elbows close to your body, and move the wand across your body toward the sore arm. 3. Hold for 8 to 12 seconds. 4. Repeat 2 to 4 times. Shoulder internal rotation with towel    1. Hold a towel above and behind your head with the arm that is not sore. 2. With your sore arm, reach behind your back and grasp the towel. 3. With the arm above your head, pull the towel upward. Do this until you feel a stretch on the front and outside of your sore shoulder. 4. Hold 15 to 30 seconds. 5. Repeat 2 to 4 times. Shoulder blade squeeze    1. Stand with your arms at your sides, and squeeze your shoulder blades together. Do not raise your shoulders up as you squeeze. 2. Hold 6 seconds. 3. Repeat 8 to 12 times.   Resisted rows    For this exercise, you will need elastic exercise material, such as surgical tubing or Thera-Band. 1. Put the band around a solid object at about waist level. (A bedpost will work well.) Each hand should hold an end of the band. 2. With your elbows at your sides and bent to 90 degrees, pull the band back. Your shoulder blades should move toward each other. Return to the starting position. 3. Repeat 8 to 12 times. External rotator strengthening exercise    1. Start by tying a piece of elastic exercise material to a doorknob. You can use surgical tubing or Thera-Band. (You may also hold one end of the band in each hand.)  2. Stand or sit with your shoulder relaxed and your elbow bent 90 degrees. Your upper arm should rest comfortably against your side. Squeeze a rolled towel between your elbow and your body for comfort. This will help keep your arm at your side. 3. Hold one end of the elastic band with the hand of the painful arm. 4. Start with your forearm across your belly. Slowly rotate the forearm out away from your body. Keep your elbow and upper arm tucked against the towel roll or the side of your body until you begin to feel tightness in your shoulder. Slowly move your arm back to where you started. 5. Repeat 8 to 12 times. Internal rotator strengthening exercise    1. Start by tying a piece of elastic exercise material to a doorknob. You can use surgical tubing or Thera-Band. 2. Stand or sit with your shoulder relaxed and your elbow bent 90 degrees. Your upper arm should rest comfortably against your side. Squeeze a rolled towel between your elbow and your body for comfort. This will help keep your arm at your side. 3. Hold one end of the elastic band in the hand of the painful arm. 4. Slowly rotate your forearm toward your body until it touches your belly. Slowly move it back to where you started. 5. Keep your elbow and upper arm firmly tucked against the towel roll or at your side. 6. Repeat 8 to 12 times.   Pendulum swing    If you have pain in your back, do not do this exercise. 1. Hold on to a table or the back of a chair with your good arm. Then bend forward a little and let your sore arm hang straight down. This exercise does not use the arm muscles. Rather, use your legs and your hips to create movement that makes your arm swing freely. 2. Use the movement from your hips and legs to guide the slightly swinging arm back and forth like a pendulum (or elephant trunk). Then guide it in circles that start small (about the size of a dinner plate). Make the circles a bit larger each day, as your pain allows. 3. Do this exercise for 5 minutes, 5 to 7 times each day. 4. As you have less pain, try bending over a little farther to do this exercise. This will increase the amount of movement at your shoulder. Follow-up care is a key part of your treatment and safety. Be sure to make and go to all appointments, and call your doctor if you are having problems. It's also a good idea to know your test results and keep a list of the medicines you take. Where can you learn more? Go to http://www.gray.com/  Enter H562 in the search box to learn more about \"Shoulder Arthritis: Exercises. \"  Current as of: July 1, 2021               Content Version: 13.0  © 2006-2021 [employer]. Care instructions adapted under license by [employer] (which disclaims liability or warranty for this information). If you have questions about a medical condition or this instruction, always ask your healthcare professional. [place] [address] any warranty or liability for your use of this information.

## 2022-02-26 LAB
ALBUMIN SERPL-MCNC: 4.3 G/DL (ref 3.7–4.7)
ALBUMIN/GLOB SERPL: 1.4 {RATIO} (ref 1.2–2.2)
ALP SERPL-CCNC: 112 IU/L (ref 44–121)
ALT SERPL-CCNC: 14 IU/L (ref 0–32)
AST SERPL-CCNC: 23 IU/L (ref 0–40)
BILIRUB SERPL-MCNC: 0.5 MG/DL (ref 0–1.2)
BUN SERPL-MCNC: 18 MG/DL (ref 8–27)
BUN/CREAT SERPL: 16 (ref 12–28)
CALCIUM SERPL-MCNC: 9.2 MG/DL (ref 8.7–10.3)
CHLORIDE SERPL-SCNC: 101 MMOL/L (ref 96–106)
CHOLEST SERPL-MCNC: 123 MG/DL (ref 100–199)
CO2 SERPL-SCNC: 24 MMOL/L (ref 20–29)
CREAT SERPL-MCNC: 1.1 MG/DL (ref 0.57–1)
EST. AVERAGE GLUCOSE BLD GHB EST-MCNC: 123 MG/DL
GLOBULIN SER CALC-MCNC: 3.1 G/DL (ref 1.5–4.5)
GLUCOSE SERPL-MCNC: 88 MG/DL (ref 65–99)
HBA1C MFR BLD: 5.9 % (ref 4.8–5.6)
HCV AB S/CO SERPL IA: <0.1 S/CO RATIO (ref 0–0.9)
HDLC SERPL-MCNC: 41 MG/DL
INTERPRETATION: NORMAL
LDLC SERPL CALC-MCNC: 69 MG/DL (ref 0–99)
POTASSIUM SERPL-SCNC: 3.7 MMOL/L (ref 3.5–5.2)
PROT SERPL-MCNC: 7.4 G/DL (ref 6–8.5)
SODIUM SERPL-SCNC: 140 MMOL/L (ref 134–144)
TRIGL SERPL-MCNC: 60 MG/DL (ref 0–149)
VLDLC SERPL CALC-MCNC: 13 MG/DL (ref 5–40)

## 2022-03-10 ENCOUNTER — TELEPHONE (OUTPATIENT)
Dept: CARDIOLOGY CLINIC | Age: 77
End: 2022-03-10

## 2022-03-10 DIAGNOSIS — M25.512 ACUTE PAIN OF LEFT SHOULDER: ICD-10-CM

## 2022-03-10 NOTE — TELEPHONE ENCOUNTER
LVM for patient's daughter regarding her remote monitor. Her remote monitor has been disconnected and haven't been able to reach patient. Asked daughter to reach the patient to get her to reconnect monitor and to call with any questions.

## 2022-03-11 RX ORDER — DICLOFENAC SODIUM 10 MG/G
GEL TOPICAL
Qty: 100 G | Refills: 0 | Status: SHIPPED | OUTPATIENT
Start: 2022-03-11

## 2022-03-18 PROBLEM — G45.1 TRANSIENT ISCHEMIC ATTACK INVOLVING LEFT INTERNAL CAROTID ARTERY: Status: ACTIVE | Noted: 2020-04-22

## 2022-03-18 PROBLEM — Z95.0 PACEMAKER: Status: ACTIVE | Noted: 2018-10-04

## 2022-03-18 PROBLEM — I65.23 BILATERAL CAROTID ARTERY STENOSIS: Status: ACTIVE | Noted: 2020-04-22

## 2022-03-18 PROBLEM — G45.9 TIA (TRANSIENT ISCHEMIC ATTACK): Status: ACTIVE | Noted: 2020-04-22

## 2022-03-19 PROBLEM — R73.03 PREDIABETES: Status: ACTIVE | Noted: 2017-09-21

## 2022-04-04 ENCOUNTER — OFFICE VISIT (OUTPATIENT)
Dept: CARDIOLOGY CLINIC | Age: 77
End: 2022-04-04
Payer: MEDICARE

## 2022-04-04 DIAGNOSIS — Z95.0 CARDIAC PACEMAKER IN SITU: Primary | ICD-10-CM

## 2022-04-04 DIAGNOSIS — I49.5 SSS (SICK SINUS SYNDROME) (HCC): ICD-10-CM

## 2022-04-04 PROCEDURE — 93294 REM INTERROG EVL PM/LDLS PM: CPT | Performed by: INTERNAL MEDICINE

## 2022-04-04 PROCEDURE — 93296 REM INTERROG EVL PM/IDS: CPT | Performed by: INTERNAL MEDICINE

## 2022-04-22 DIAGNOSIS — I10 ESSENTIAL HYPERTENSION: ICD-10-CM

## 2022-04-22 NOTE — TELEPHONE ENCOUNTER
[facility] calling for a refill       Eliquis 5 mg tablet   losartoan-hydrochlorothiazien     Said they sent a fax last week       Best number to reach them is [phone]

## 2022-04-23 RX ORDER — LOSARTAN POTASSIUM AND HYDROCHLOROTHIAZIDE 12.5; 5 MG/1; MG/1
2 TABLET ORAL DAILY
Qty: 180 TABLET | Refills: 3 | Status: SHIPPED | OUTPATIENT
Start: 2022-04-23

## 2022-06-15 NOTE — PROGRESS NOTES
Ambulatory with steady gait.  No new distress and no further questions or concerns.  Expressed understanding of discharge information and medications.     See scanned  pacer report in chart. Chargeable visit.

## 2022-07-11 ENCOUNTER — TELEPHONE (OUTPATIENT)
Dept: FAMILY MEDICINE CLINIC | Age: 77
End: 2022-07-11

## 2022-07-11 NOTE — TELEPHONE ENCOUNTER
PSR offered appt on 8/16/22 patient said she couldn't do it   And then stated she works 7 - 3:30 - PSR offered 8/16/22 at 4;15pm and patient said no     Recommendation was to check schedule for any planned time off or if she can go in late or get off early and call us ahead of time so we can find something on schedule

## 2022-07-11 NOTE — TELEPHONE ENCOUNTER
----- Message from Yeni Dawes sent at 7/11/2022  1:53 PM EDT -----  Subject: Appointment Request    Reason for Call: Established Patient Appointment needed: Routine Existing   Condition Follow Up    QUESTIONS    Reason for appointment request? No appointments available during search     Additional Information for Provider? Pt needs to schedule a follow up appt   for left shoulder pain. provider only has virtual appts and pt wants to be   seen in person.  please call to schedule  ---------------------------------------------------------------------------  --------------  Cris JOHNSON  [phone]; OK to leave message on voicemail  ---------------------------------------------------------------------------  --------------  SCRIPT ANSWERS  COVID Screen: General Osmar

## 2022-07-21 ENCOUNTER — OFFICE VISIT (OUTPATIENT)
Dept: CARDIOLOGY CLINIC | Age: 77
End: 2022-07-21
Payer: MEDICARE

## 2022-07-21 VITALS
OXYGEN SATURATION: 99 % | BODY MASS INDEX: 34.6 KG/M2 | SYSTOLIC BLOOD PRESSURE: 125 MMHG | HEART RATE: 60 BPM | RESPIRATION RATE: 18 BRPM | WEIGHT: 188 LBS | DIASTOLIC BLOOD PRESSURE: 84 MMHG | HEIGHT: 62 IN

## 2022-07-21 DIAGNOSIS — I10 ESSENTIAL HYPERTENSION: ICD-10-CM

## 2022-07-21 DIAGNOSIS — I49.5 SSS (SICK SINUS SYNDROME) (HCC): ICD-10-CM

## 2022-07-21 DIAGNOSIS — I48.0 PAF (PAROXYSMAL ATRIAL FIBRILLATION) (HCC): Primary | ICD-10-CM

## 2022-07-21 DIAGNOSIS — Z95.0 CARDIAC PACEMAKER IN SITU: ICD-10-CM

## 2022-07-21 DIAGNOSIS — E78.2 MIXED HYPERLIPIDEMIA: ICD-10-CM

## 2022-07-21 PROBLEM — N18.30 CHRONIC RENAL DISEASE, STAGE III (HCC): Status: ACTIVE | Noted: 2022-07-21

## 2022-07-21 PROCEDURE — 99213 OFFICE O/P EST LOW 20 MIN: CPT | Performed by: SPECIALIST

## 2022-07-21 PROCEDURE — 1101F PT FALLS ASSESS-DOCD LE1/YR: CPT | Performed by: SPECIALIST

## 2022-07-21 PROCEDURE — G8536 NO DOC ELDER MAL SCRN: HCPCS | Performed by: SPECIALIST

## 2022-07-21 PROCEDURE — G8427 DOCREV CUR MEDS BY ELIG CLIN: HCPCS | Performed by: SPECIALIST

## 2022-07-21 PROCEDURE — G8510 SCR DEP NEG, NO PLAN REQD: HCPCS | Performed by: SPECIALIST

## 2022-07-21 PROCEDURE — 1090F PRES/ABSN URINE INCON ASSESS: CPT | Performed by: SPECIALIST

## 2022-07-21 PROCEDURE — G8752 SYS BP LESS 140: HCPCS | Performed by: SPECIALIST

## 2022-07-21 PROCEDURE — G8399 PT W/DXA RESULTS DOCUMENT: HCPCS | Performed by: SPECIALIST

## 2022-07-21 PROCEDURE — G8417 CALC BMI ABV UP PARAM F/U: HCPCS | Performed by: SPECIALIST

## 2022-07-21 PROCEDURE — G8754 DIAS BP LESS 90: HCPCS | Performed by: SPECIALIST

## 2022-07-21 PROCEDURE — 1123F ACP DISCUSS/DSCN MKR DOCD: CPT | Performed by: SPECIALIST

## 2022-07-21 NOTE — PATIENT INSTRUCTIONS
Per patient, her co-pay for Eliquis has increased to $500.00 for 90-day supply. Will process patient Assistance, and check for Xarelto 20mg tablet to be taken daily as another option.
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
## 2022-07-26 ENCOUNTER — TELEPHONE (OUTPATIENT)
Dept: CARDIOLOGY CLINIC | Age: 77
End: 2022-07-26

## 2022-07-26 DIAGNOSIS — I48.0 PAF (PAROXYSMAL ATRIAL FIBRILLATION) (HCC): Primary | ICD-10-CM

## 2022-07-26 NOTE — TELEPHONE ENCOUNTER
I called and spoke with the patient, two identifiers verified. I have discussed with her about her Eliquis vs Xarelto co-pay. I reminded her to fill out the form and drop it off at the office so we could proceed with the Patient Assistance process. I also advised her to attach proof of income to be submitted to [employer]. Patient verbalized understanding. No other concerns at this time.

## 2022-07-26 NOTE — TELEPHONE ENCOUNTER
I called Olaf Ivey to check medication co-pay cost. I spoke with [facility], [address] [place] [address] co-pay is $424 for 90 day supply. Xarelto co-pay is $452.35 for 90 days. According to her, patient has insurance gap. I called patient to discuss about the medication costs and to remind her of the form for the assistance program given to her on her visit to fill out, no answer. I left a VM.

## 2022-07-28 NOTE — TELEPHONE ENCOUNTER
Patient arrived in the clinic today to drop off the Patient Assistance Form for her Eliquis that she filled out with a copy of her W2. Once completed and signed by provider, I will fax it to the [facility]. I have given patient a 30-day trial coupon from [employer] for her to use while processing and waiting for the foundation's decision. I have explained the process to the patient. She verbalized understanding. No other concerns at this time.

## 2022-08-02 ENCOUNTER — TELEPHONE (OUTPATIENT)
Dept: CARDIOLOGY CLINIC | Age: 77
End: 2022-08-02

## 2022-08-02 NOTE — TELEPHONE ENCOUNTER
I called patient and discussed with her that Alyssia Mckenna from [facility] faxed an updated form that will be effective for 5 years and requires for her or authorized representative to fill out and sign the form. Patient agreed into me filling out and signing as her authorized representative. (Forms scanned to media)     I also discussed with her the importance of taking the medication as prescribed, and the risk of stopping the medication. Patient verbalized understanding.      Still waiting for Ziyad 's approval.

## 2022-08-02 NOTE — TELEPHONE ENCOUNTER
Pricila Doe from [facility] called to inform me that she received the application for the [employer] together with the patient's insurance information and W2. However, the form that we have in the office is outdated. She will fax the new form that will cover 5 years instead of 2 years only.

## 2022-08-09 NOTE — TELEPHONE ENCOUNTER
I called to notify patient that completed summary benefits  for Eliquis has been received from [employer] (copy will be scanned to the chart). No answer. I left a VM requesting for callback.     Still waiting for the [facility] Patient Assistance Program.

## 2022-08-18 NOTE — TELEPHONE ENCOUNTER
Patient Arrived in the office today. She informed me that she received a letter from [facility] KADY JON requesting for additional document. Copy of the Benefit Verification letter from [address] [place] [address] Nw faxed to [facility] today, original document returned to patient.     I have also notified her about the response from [employer].

## 2022-08-23 NOTE — TELEPHONE ENCOUNTER
I have received an approval letter from [address] [place] [address] for patient's Eliquis, she is now eligible to receive Eliquis free-of-charge from 08/23/2022 through 12/31/2022. I have called [employer] to confirm. Per agent, they will notify [facility] within 24 hours. Then [facility] will contact [facility] for further details and instructions. I have notified patient via phone call that Eliquis has been APPROVED. She verbalized understanding.

## 2022-10-27 ENCOUNTER — VIRTUAL VISIT (OUTPATIENT)
Dept: FAMILY MEDICINE CLINIC | Age: 77
End: 2022-10-27
Payer: MEDICARE

## 2022-10-27 DIAGNOSIS — F43.21 GRIEF: Primary | ICD-10-CM

## 2022-10-27 DIAGNOSIS — F43.22 ADJUSTMENT DISORDER WITH ANXIOUS MOOD: ICD-10-CM

## 2022-10-27 PROCEDURE — 99441 PR PHYS/QHP TELEPHONE EVALUATION 5-10 MIN: CPT | Performed by: FAMILY MEDICINE

## 2022-10-27 RX ORDER — CLONAZEPAM 0.5 MG/1
0.5 TABLET ORAL 2 TIMES DAILY
Qty: 30 TABLET | Refills: 0 | Status: SHIPPED | OUTPATIENT
Start: 2022-10-27

## 2022-10-27 NOTE — PROGRESS NOTES
Chief Complaint   Patient presents with    Anxiety     With Insomnia    Depression     1. \"Have you been to the ER, urgent care clinic since your last visit? Hospitalized since your last visit?\"     2. \"Have you seen or consulted any other health care providers outside of the [address] [place] [address] since your last visit? \"      3. For patients aged 39-70: Has the patient had a colonoscopy / FIT/ Cologuard? Yes      If the patient is female:    4. For patients aged 41-77: Has the patient had a mammogram within the past 2 years? N/A      5. For patients aged 21-65: Has the patient had a pap smear? N/A    Pt stated every since her the murder of her grandson she have been feeling depressed and she is not sleeping causing anxiety. Pt can be reached at [phone] for her VV.
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
## 2022-10-27 NOTE — Clinical Note
From: Mar Valdez  To: Antonette Neri DO  Sent: 7/6/2020 11:22 AM CDT  Subject: Non-Urgent Medical Question    I've also lost 15 lbs since May 4th.   pls call for follow up appt.  diane

## 2022-11-03 ENCOUNTER — TELEPHONE (OUTPATIENT)
Dept: CARDIOLOGY CLINIC | Age: 77
End: 2022-11-03

## 2022-11-03 NOTE — TELEPHONE ENCOUNTER
I spoke with the patient, two identifiers verified. I have discussed with her the letter received from [address] [place] [address] for her Eliquis. She will drop by at the office on Monday or next week once she has the proof of financial income.

## 2022-11-03 NOTE — TELEPHONE ENCOUNTER
I received a letter for patient's Eliquis reverification via fax from [address] Patient [facility]. Assistance approval will end on 12/31/2022. Re-application renewal is needed. I called patient to discuss, no answer. I left a voicemail requesting for callback.

## 2022-11-08 NOTE — TELEPHONE ENCOUNTER
I called to follow with her regarding the previous conversation we had. No answer, I left a VM requesting for callback.

## 2022-11-14 NOTE — TELEPHONE ENCOUNTER
I called and spoke with the patient about the re-enrollment for her [facility]. She stated that she called Social Security and is working on her proof of financial income. Once she has it available on hand, she will drop by at the clinic to fill out the form and submit the needed document -proof of her annual house hold income.     Patient is aware that the assistance from [address] [place] [address] will end on 12/31/2022

## 2023-01-24 ENCOUNTER — OFFICE VISIT (OUTPATIENT)
Dept: CARDIOLOGY CLINIC | Age: 78
End: 2023-01-24
Payer: MEDICARE

## 2023-01-24 VITALS
BODY MASS INDEX: 34.6 KG/M2 | WEIGHT: 188 LBS | HEART RATE: 62 BPM | DIASTOLIC BLOOD PRESSURE: 78 MMHG | SYSTOLIC BLOOD PRESSURE: 138 MMHG | RESPIRATION RATE: 18 BRPM | HEIGHT: 62 IN | OXYGEN SATURATION: 99 %

## 2023-01-24 DIAGNOSIS — E78.2 MIXED HYPERLIPIDEMIA: ICD-10-CM

## 2023-01-24 DIAGNOSIS — I10 PRIMARY HYPERTENSION: ICD-10-CM

## 2023-01-24 DIAGNOSIS — Z95.0 CARDIAC PACEMAKER IN SITU: ICD-10-CM

## 2023-01-24 DIAGNOSIS — I49.5 SSS (SICK SINUS SYNDROME) (HCC): ICD-10-CM

## 2023-01-24 DIAGNOSIS — I48.0 PAF (PAROXYSMAL ATRIAL FIBRILLATION) (HCC): Primary | ICD-10-CM

## 2023-01-24 PROCEDURE — 3078F DIAST BP <80 MM HG: CPT | Performed by: SPECIALIST

## 2023-01-24 PROCEDURE — G8399 PT W/DXA RESULTS DOCUMENT: HCPCS | Performed by: SPECIALIST

## 2023-01-24 PROCEDURE — G8510 SCR DEP NEG, NO PLAN REQD: HCPCS | Performed by: SPECIALIST

## 2023-01-24 PROCEDURE — 1123F ACP DISCUSS/DSCN MKR DOCD: CPT | Performed by: SPECIALIST

## 2023-01-24 PROCEDURE — 1101F PT FALLS ASSESS-DOCD LE1/YR: CPT | Performed by: SPECIALIST

## 2023-01-24 PROCEDURE — 3075F SYST BP GE 130 - 139MM HG: CPT | Performed by: SPECIALIST

## 2023-01-24 PROCEDURE — 99213 OFFICE O/P EST LOW 20 MIN: CPT | Performed by: SPECIALIST

## 2023-01-24 PROCEDURE — G8427 DOCREV CUR MEDS BY ELIG CLIN: HCPCS | Performed by: SPECIALIST

## 2023-01-24 PROCEDURE — G8536 NO DOC ELDER MAL SCRN: HCPCS | Performed by: SPECIALIST

## 2023-01-24 PROCEDURE — 1090F PRES/ABSN URINE INCON ASSESS: CPT | Performed by: SPECIALIST

## 2023-01-24 PROCEDURE — G8417 CALC BMI ABV UP PARAM F/U: HCPCS | Performed by: SPECIALIST

## 2023-01-24 NOTE — PROGRESS NOTES
HISTORY OF PRESENT ILLNESS  Monet Alberto is a 65 y.o. female     SUMMARY:   Problem List  Date Reviewed: 1/23/2023            Codes Class Noted    Chronic renal disease, stage III ICD-10-CM: N18.30  ICD-9-CM: 585.3  7/21/2022        TIA (transient ischemic attack) ICD-10-CM: G45.9  ICD-9-CM: 435.9  4/22/2020        Bilateral carotid artery stenosis ICD-10-CM: I65.23  ICD-9-CM: 433.10, 433.30  4/22/2020        Transient ischemic attack involving left internal carotid artery ICD-10-CM: G45.1  ICD-9-CM: 435.8  4/22/2020        Pacemaker ICD-10-CM: Z95.0  ICD-9-CM: V45.01  10/4/2018        Severe obesity (BMI 35.0-39. 9) ICD-10-CM: E66.01  ICD-9-CM: 278.01  6/20/2018        Prediabetes ICD-10-CM: R73.03  ICD-9-CM: 790.29  9/21/2017        S/P cardiac pacemaker procedure ICD-10-CM: Z95.0  ICD-9-CM: V45.01  6/9/2016    Overview Signed 6/9/2016 12:31 PM by Coreen Epstein NP     6/9/16 [employer] dual chamber pacemaker implant             PAF (paroxysmal atrial fibrillation) 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 ICD-10-CM: I49.8  ICD-9-CM: 427.89  5/23/2016        APC (atrial premature contractions) ICD-10-CM: I49.1  ICD-9-CM: 427.61  5/23/2016        First degree AV block ICD-10-CM: I44.0  ICD-9-CM: 426.11  5/23/2016        SSS (sick sinus syndrome) ([facility] [address] ICD-10-CM: I49.5  ICD-9-CM: 427.81  5/23/2016        Advanced care planning/counseling discussion ICD-10-CM: Z71.89  ICD-9-CM: V65.49  3/22/2016        Dyslipidemia ICD-10-CM: E78.5  ICD-9-CM: 272.4  6/12/2014        Bradycardia, sinus ICD-10-CM: R00.1  ICD-9-CM: 427.89  5/10/2013        Urge incontinence ICD-10-CM: N39.41  ICD-9-CM: 788.31  Unknown        S/P CABG x 4 ICD-10-CM: Z95.1  ICD-9-CM: V45.81  9/20/2012    Overview Signed 9/20/2012 10:28 AM by [employer] Thea, Isabell Marie     9/7/12--Dahlia Oscar--VASQUEZ-LAD,SVG D1/D2, SVG PDA-RCA             Mixed hyperlipidemia ICD-10-CM: E78.2  ICD-9-CM: 272.2  3/20/2012        Chronic ischemic heart disease ICD-10-CM: I25.9  ICD-9-CM: 414.9  3/20/2012        Postsurgical aortocoronary bypass status ICD-10-CM: Z95.1  ICD-9-CM: V45.81  3/20/2012        Coronary atherosclerosis of native coronary artery ICD-10-CM: I25.10  ICD-9-CM: 414.01  3/20/2012        Encounter for long-term (current) use of medications ICD-10-CM: Z79.899  ICD-9-CM: V58.69  7/28/2010        Cervical disc herniation ICD-10-CM: M50.20  ICD-9-CM: 722.0  Unknown        Hypertension ICD-10-CM: I10  ICD-9-CM: 401.9  Unknown        Paroxysmal supraventricular tachycardia ([facility] [address] ICD-10-CM: I47.1  ICD-9-CM: 427.0  Unknown           Current Outpatient Medications on File Prior to Visit   Medication Sig    atorvastatin (LIPITOR) 40 mg tablet TAKE 1 TABLET EVERY DAY    sotaloL (BETAPACE) 80 mg tablet TAKE 1 TABLET TWICE DAILY AS DIRECTED    clonazePAM (KlonoPIN) 0.5 mg tablet Take 1 Tablet by mouth two (2) times a day. Max Daily Amount: 1 mg. apixaban (Eliquis) 5 mg tablet Take 1 Tablet by mouth two (2) times a day. losartan-hydroCHLOROthiazide (HYZAAR) 50-12.5 mg per tablet Take 2 Tablets by mouth daily. aspirin delayed-release 81 mg tablet Take 1 Tab by mouth daily. PSYLLIUM HUSK (DAILY FIBER PO) Take  by mouth daily. FOLIC ACID/MULTIVIT-MIN/LUTEIN (CENTRUM SILVER PO) Take  by mouth daily. omega-3 fatty acids-vitamin e 1,000 mg cap Take  by mouth daily. No current facility-administered medications on file prior to visit. CARDIOLOGY STUDIES TO DATE:  [facility] DOI 06/09/16, on remote, NOT MRI Conditional    04/21/20 echo normal lvef, lvh, lae, mild mr, mild tr with pa pressure 44mm    Chief Complaint   Patient presents with    Follow-up     6 mo appt. HPI :  She continues to do extremely well.   She is still working at [employer].  But around Mun[employer] they hacked one of her credit cards but fortunately that all got straightened out and she got her money back. Primary care is following her lipids and she is keeping her pacemaker follow-ups at [facility].  Blood pressure is well controlled. CARDIAC ROS:   negative for chest pain, dyspnea, palpitations, syncope, orthopnea, paroxysmal nocturnal dyspnea, exertional chest pressure/discomfort, claudication, lower extremity edema    Family History   Problem Relation Age of Onset    Asthma Mother     Hypertension Father     Stroke Father     Alcohol abuse Father     Cancer Sister         breast    Diabetes Brother     Heart Disease Sister     Heart Disease Brother        Past Medical History:   Diagnosis Date    Acquired cyst of kidney     CAD (coronary artery disease)     Cervical disc herniation     Clotting disorder ([address]     Essential hypertension     Hypercholesterolemia     Hypertension     Long term current use of anticoagulant therapy     Pacemaker     PAF (paroxysmal atrial fibrillation) ([facility] [address] 6/9/2016    Paroxysmal supraventricular tachycardia ([facility] [address]     Postsurgical aortocoronary bypass status 3/20/2012    S/P cardiac pacemaker procedure 6/9/2016 6/9/16 [employer] dual chamber pacemaker implant    Stroke [facility])     Thyroid disease     Urge incontinence        GENERAL ROS:  A comprehensive review of systems was negative except for that written in the HPI.     Visit Vitals  /78 (BP 1 Location: Left upper arm, BP Patient Position: Sitting, BP Cuff Size: Large adult)   Pulse 62   Resp 18   Ht 5' 2\" (1.575 m)   Wt 188 lb (85.3 kg)   LMP 08/24/1993   SpO2 99%   BMI 34.39 kg/m²       Wt Readings from Last 3 Encounters:   01/24/23 188 lb (85.3 kg)   07/21/22 188 lb (85.3 kg)   02/25/22 193 lb 3.2 oz (87.6 kg)            BP Readings from Last 3 Encounters:   01/24/23 138/78   07/21/22 125/84   02/25/22 133/65       PHYSICAL EXAM  General appearance: alert, cooperative, no distress, appears stated age  Neurologic: Alert and oriented X 3  Neck: supple, symmetrical, trachea midline, no adenopathy, no carotid bruit, and no JVD  Lungs: clear to auscultation bilaterally  Heart: regular rate and rhythm, S1, S2 normal, no murmur, click, rub or gallop  Extremities: extremities normal, atraumatic, no cyanosis or edema    Lab Results   Component Value Date/Time    Cholesterol, total 123 02/25/2022 12:11 PM    Cholesterol, total 130 08/25/2021 09:58 AM    Cholesterol, total 154 04/22/2020 12:10 AM    Cholesterol, total 123 10/11/2019 12:04 PM    Cholesterol, total 127 04/05/2019 09:11 AM    HDL Cholesterol 41 02/25/2022 12:11 PM    HDL Cholesterol 45 08/25/2021 09:58 AM    HDL Cholesterol 57 04/22/2020 12:10 AM    HDL Cholesterol 44 10/11/2019 12:04 PM    HDL Cholesterol 44 04/05/2019 09:11 AM    LDL, calculated 69 02/25/2022 12:11 PM    LDL, calculated 72 08/25/2021 09:58 AM    LDL, calculated 83.4 04/22/2020 12:10 AM    LDL, calculated 69 10/11/2019 12:04 PM    LDL, calculated 71 04/05/2019 09:11 AM    LDL, calculated 75 03/22/2018 11:34 AM    LDL, calculated 75 09/21/2017 11:17 AM    Triglyceride 60 02/25/2022 12:11 PM    Triglyceride 63 08/25/2021 09:58 AM    Triglyceride 68 04/22/2020 12:10 AM    Triglyceride 49 10/11/2019 12:04 PM    Triglyceride 58 04/05/2019 09:11 AM    CHOL/HDL Ratio 2.7 04/22/2020 12:10 AM    CHOL/HDL Ratio 3.4 07/28/2010 11:07 AM    CHOL/HDL Ratio 3.5 08/31/2009 01:31 PM    CHOL/HDL Ratio 3.2 01/21/2009 02:19 PM     ASSESSMENT :      She is stable and asymptomatic, well compensated on a good medical regimen and needs no cardiac testing at this time. current treatment plan is effective, no change in therapy  lab results and schedule of future lab studies reviewed with patient  reviewed diet, exercise and weight control    Encounter Diagnoses   Name Primary?     PAF (paroxysmal atrial fibrillation) (HCC) Yes    Primary hypertension     Mixed hyperlipidemia     Cardiac pacemaker in situ     SSS (sick sinus syndrome) ([facility] [address]      No orders of the defined types were placed in this encounter. Follow-up and Dispositions    Return in about 6 months (around 7/24/2023). Angeli Redd MD  1/24/2023  Please note that this dictation was completed with [employer], the computer voice recognition software. Quite often unanticipated grammatical, syntax, homophones, and other interpretive errors are inadvertently transcribed by the computer software. Please disregard these errors. Please excuse any errors that have escaped final proofreading. Thank you.

## 2023-02-14 ENCOUNTER — TELEPHONE (OUTPATIENT)
Dept: CARDIOLOGY CLINIC | Age: 78
End: 2023-02-14

## 2023-02-14 NOTE — TELEPHONE ENCOUNTER
Jamie D[address] [address] approved patient's Eliquis free of charge from 02/11/2023 through 12/31/2023. I called patient on her mobile phone, no answer. I called home phone number on file. Her son, Lela Frances stated patient is still asleep. I requested for patient to call me back, not urgent.

## 2023-03-09 DIAGNOSIS — I10 ESSENTIAL HYPERTENSION: ICD-10-CM

## 2023-03-10 RX ORDER — LOSARTAN POTASSIUM AND HYDROCHLOROTHIAZIDE 12.5; 5 MG/1; MG/1
TABLET ORAL
Qty: 180 TABLET | Refills: 3 | Status: SHIPPED | OUTPATIENT
Start: 2023-03-10

## 2023-03-14 DIAGNOSIS — F43.22 ADJUSTMENT DISORDER WITH ANXIOUS MOOD: ICD-10-CM

## 2023-03-14 DIAGNOSIS — M25.512 ACUTE PAIN OF LEFT SHOULDER: ICD-10-CM

## 2023-03-14 DIAGNOSIS — F43.21 GRIEF: ICD-10-CM

## 2023-03-14 NOTE — TELEPHONE ENCOUNTER
Last Visit: 10/27/22 with MD Lorena Dumont  Next Appointment: none  Previous Refill Encounter(s): 10/27/22 #30    Requested Prescriptions     Pending Prescriptions Disp Refills    clonazePAM (KlonoPIN) 0.5 mg tablet 30 Tablet 0     Sig: Take 1 Tablet by mouth two (2) times a day. Max Daily Amount: 1 mg. For Pharmacy Admin Tracking Only    Program: Medication Refill  CPA in place:   Recommendation Provided To:    Intervention Detail: New Rx: 1, reason: Patient Preference  Intervention Accepted By:   Kimberly Mittal Closed?:   Time Spent (min): 5

## 2023-03-17 RX ORDER — DICLOFENAC SODIUM 10 MG/G
GEL TOPICAL
Qty: 100 G | Refills: 0 | Status: SHIPPED | OUTPATIENT
Start: 2023-03-17

## 2023-03-17 RX ORDER — CLONAZEPAM 0.5 MG/1
0.5 TABLET ORAL 2 TIMES DAILY
Qty: 30 TABLET | Refills: 0 | Status: SHIPPED | OUTPATIENT
Start: 2023-03-17

## 2023-03-20 DIAGNOSIS — I10 PRIMARY HYPERTENSION: ICD-10-CM

## 2023-03-20 RX ORDER — SOTALOL HYDROCHLORIDE 80 MG/1
TABLET ORAL
Qty: 180 TABLET | Refills: 0 | Status: SHIPPED | OUTPATIENT
Start: 2023-03-20

## 2023-03-20 NOTE — TELEPHONE ENCOUNTER
Requested Prescriptions     Signed Prescriptions Disp Refills    sotaloL (BETAPACE) 80 mg tablet 180 Tablet 0     Sig: TAKE 1 TABLET TWICE DAILY AS DIRECTED     Authorizing Provider: Merritt Jordan     Ordering User: Guanaco Bell    Per Dr. Grullon Host verbal orders     Made note next to follow up appt to get ekg next office visit

## 2023-05-05 ENCOUNTER — OFFICE VISIT (OUTPATIENT)
Dept: FAMILY MEDICINE CLINIC | Age: 78
End: 2023-05-05

## 2023-05-05 VITALS
TEMPERATURE: 97.5 F | HEIGHT: 62 IN | WEIGHT: 184.4 LBS | BODY MASS INDEX: 33.93 KG/M2 | SYSTOLIC BLOOD PRESSURE: 128 MMHG | HEART RATE: 62 BPM | RESPIRATION RATE: 16 BRPM | OXYGEN SATURATION: 96 % | DIASTOLIC BLOOD PRESSURE: 59 MMHG

## 2023-05-05 DIAGNOSIS — I49.5 SSS (SICK SINUS SYNDROME) (HCC): ICD-10-CM

## 2023-05-05 DIAGNOSIS — R73.03 PREDIABETES: ICD-10-CM

## 2023-05-05 DIAGNOSIS — E78.2 MIXED HYPERLIPIDEMIA: ICD-10-CM

## 2023-05-05 DIAGNOSIS — N18.31 STAGE 3A CHRONIC KIDNEY DISEASE (HCC): ICD-10-CM

## 2023-05-05 DIAGNOSIS — Z00.00 MEDICARE ANNUAL WELLNESS VISIT, SUBSEQUENT: Primary | ICD-10-CM

## 2023-05-05 DIAGNOSIS — I48.0 PAF (PAROXYSMAL ATRIAL FIBRILLATION) (HCC): ICD-10-CM

## 2023-05-05 DIAGNOSIS — Z95.0 PACEMAKER: ICD-10-CM

## 2023-05-05 DIAGNOSIS — I10 ESSENTIAL HYPERTENSION: ICD-10-CM

## 2023-05-05 DIAGNOSIS — I25.810 CORONARY ARTERY DISEASE INVOLVING CORONARY BYPASS GRAFT OF NATIVE HEART WITHOUT ANGINA PECTORIS: ICD-10-CM

## 2023-05-05 DIAGNOSIS — Z79.899 ENCOUNTER FOR LONG-TERM (CURRENT) USE OF MEDICATIONS: ICD-10-CM

## 2023-05-05 DIAGNOSIS — I25.9 CHRONIC ISCHEMIC HEART DISEASE: ICD-10-CM

## 2023-05-05 DIAGNOSIS — Z95.1 S/P CABG X 4: ICD-10-CM

## 2023-05-11 DIAGNOSIS — D64.9 ANEMIA, UNSPECIFIED TYPE: ICD-10-CM

## 2023-05-11 DIAGNOSIS — N17.9 AKI (ACUTE KIDNEY INJURY) (HCC): ICD-10-CM

## 2023-05-11 DIAGNOSIS — N18.31 STAGE 3A CHRONIC KIDNEY DISEASE (HCC): Primary | ICD-10-CM

## 2023-06-27 ENCOUNTER — TELEPHONE (OUTPATIENT)
Age: 78
End: 2023-06-27

## 2023-06-27 DIAGNOSIS — I48.0 PAROXYSMAL ATRIAL FIBRILLATION (HCC): Primary | ICD-10-CM

## 2023-06-27 NOTE — TELEPHONE ENCOUNTER
When I selected sotalol to send refill there is a warning stating it is \"contraindicated with this patient!!\" There is no reason listed. Do you know why it could be? Okay to fill? I made a reminder note to get an EKG next to upcoming appointment with Dr. Sariah Boswell.     Future Appointments   Date Time Provider [address]   7/25/2023 10:20 AM Abdoulaye Echavarria MD [facility] BS AMB   11/7/2023  9:45 AM Neli Anderson MD [facility] ER BS AMB

## 2023-06-27 NOTE — TELEPHONE ENCOUNTER
Refill needed:    atorvastatin (LIPITOR) 40 MG tablet    sotalol (BETAPACE) 80 MG tablet      Thanks

## 2023-07-10 RX ORDER — LOSARTAN POTASSIUM AND HYDROCHLOROTHIAZIDE 12.5; 5 MG/1; MG/1
TABLET ORAL
Qty: 180 TABLET | Refills: 3 | Status: SHIPPED | OUTPATIENT
Start: 2023-07-10

## 2023-07-10 NOTE — TELEPHONE ENCOUNTER
Last appointment: 5/5/23  Next appointment: 11/7/23    Requested Prescriptions     Pending Prescriptions Disp Refills    losartan-hydroCHLOROthiazide (HYZAAR) 50-12.5 MG per tablet 180 tablet 3     Sig: TAKE 2 TABLETS EVERY DAY         For Pharmacy Admin Tracking Only    Program: Medication Refill  CPA in place:    Recommendation Provided To:    Intervention Detail: New Rx: 1, reason: Patient Preference  Intervention Accepted By:   Oumar Mcallister Closed?:    Time Spent (min): 5

## 2023-07-10 NOTE — TELEPHONE ENCOUNTER
----- Message from Gio Mckeon sent at 7/10/2023 12:49 PM EDT -----  Subject: Refill Request    QUESTIONS  Name of Medication? losartan-hydroCHLOROthiazide (HYZAAR) 50-12.5 MG per   tablet  Patient-reported dosage and instructions? TAKE 2 TABLETS EVERY DAY  How many days do you have left? 0  Preferred Pharmacy? [facility] ([address] [facility])  Pharmacy phone number (if available)? [phone]  ---------------------------------------------------------------------------  --------------  CALL BACK INFO  What is the best way for the office to contact you? OK to leave message on   voicemail  Preferred Call Back Phone Number? [phone]  ---------------------------------------------------------------------------  --------------  SCRIPT ANSWERS  Relationship to Patient?  Self

## 2023-07-24 NOTE — TELEPHONE ENCOUNTER
Per VO of MD    LOV: 1/24/2023    Future Appointments   Date Time Provider [address]   7/25/2023 10:20 AM Abdoulaye Echavarria MD [facility] BS AMB   11/7/2023  9:45 AM Neli Anderson MD [facility] BS AMB       Requested Prescriptions     Signed Prescriptions Disp Refills    apixaban (ELIQUIS) 5 MG TABS tablet 60 tablet 5     Sig: Take 1 tablet by mouth 2 times daily     Authorizing Provider: Abdoulaye Echavarria     Ordering User: Simi Montemayor

## 2023-07-25 ENCOUNTER — OFFICE VISIT (OUTPATIENT)
Age: 78
End: 2023-07-25

## 2023-07-25 VITALS
HEART RATE: 60 BPM | BODY MASS INDEX: 34.04 KG/M2 | SYSTOLIC BLOOD PRESSURE: 136 MMHG | HEIGHT: 62 IN | DIASTOLIC BLOOD PRESSURE: 80 MMHG | WEIGHT: 185 LBS | OXYGEN SATURATION: 96 %

## 2023-07-25 DIAGNOSIS — I10 ESSENTIAL (PRIMARY) HYPERTENSION: ICD-10-CM

## 2023-07-25 DIAGNOSIS — I48.0 PAROXYSMAL ATRIAL FIBRILLATION (HCC): Primary | ICD-10-CM

## 2023-07-25 DIAGNOSIS — F43.22 ADJUSTMENT DISORDER WITH ANXIETY: Primary | ICD-10-CM

## 2023-07-25 DIAGNOSIS — E78.2 MIXED HYPERLIPIDEMIA: ICD-10-CM

## 2023-07-25 PROCEDURE — 1123F ACP DISCUSS/DSCN MKR DOCD: CPT | Performed by: SPECIALIST

## 2023-07-25 PROCEDURE — 99213 OFFICE O/P EST LOW 20 MIN: CPT | Performed by: SPECIALIST

## 2023-07-25 PROCEDURE — 3075F SYST BP GE 130 - 139MM HG: CPT | Performed by: SPECIALIST

## 2023-07-25 PROCEDURE — 3079F DIAST BP 80-89 MM HG: CPT | Performed by: SPECIALIST

## 2023-07-25 RX ORDER — SOTALOL HYDROCHLORIDE 80 MG/1
TABLET ORAL
Qty: 180 TABLET | Refills: 1 | Status: SHIPPED | OUTPATIENT
Start: 2023-07-25

## 2023-07-25 ASSESSMENT — PATIENT HEALTH QUESTIONNAIRE - PHQ9
SUM OF ALL RESPONSES TO PHQ9 QUESTIONS 1 & 2: 0
SUM OF ALL RESPONSES TO PHQ QUESTIONS 1-9: 0
1. LITTLE INTEREST OR PLEASURE IN DOING THINGS: 0
SUM OF ALL RESPONSES TO PHQ QUESTIONS 1-9: 0
2. FEELING DOWN, DEPRESSED OR HOPELESS: 0

## 2023-07-25 NOTE — PROGRESS NOTES
01/24/23 188 lb (85.3 kg)            BP Readings from Last 3 Encounters:   07/25/23 136/80   05/05/23 (!) 128/59   01/24/23 138/78       PHYSICAL EXAM  General appearance: alert, appears stated age, and cooperative  Neck: no adenopathy, no carotid bruit, no JVD, and supple, symmetrical, trachea midline  Lungs: clear to auscultation bilaterally  Heart: regular rate and rhythm, S1, S2 normal, no murmur, click, rub or gallop  Extremities: extremities normal, atraumatic, no cyanosis or edema      Lab Results   Component Value Date/Time    HDL 38 05/05/2023 10:51 AM    HDL 41 02/25/2022 12:11 PM    HDL 45 08/25/2021 09:58 AM    LDLCALC 64 05/05/2023 10:51 AM    LDLCALC 69 02/25/2022 12:11 PM    LDLCALC 72 08/25/2021 09:58 AM    TRIG 66 05/05/2023 10:51 AM    TRIG 60 02/25/2022 12:11 PM    TRIG 63 08/25/2021 09:58 AM       ASSESSMENT :      She is stable at this point and we will continue her sotalol and Eliquis in addition to her other medications for blood pressure. She needs no cardiac testing at this time. current treatment plan is effective, no change in therapy  lab results and schedule of future lab studies reviewed with patient  reviewed diet, exercise and weight control     Encounter Diagnoses   Name Primary? Paroxysmal atrial fibrillation ([address]) Yes    Essential (primary) hypertension     Mixed hyperlipidemia          Future Appointments   Date Time Provider [address]   11/7/2023  9:45 AM Yung Craig MD [facility] BS AMB   1/25/2024 10:20 AM Mike Chavez MD [facility] Saqib Major MD  7/25/2023  Please note that this dictation was completed with [employer], the computer voice recognition software. Quite often unanticipated grammatical, syntax, homophones, and other interpretive errors are inadvertently transcribed by the computer software. Please disregard these errors. Please excuse any errors that have escaped final proofreading. Thank you.

## 2023-07-25 NOTE — TELEPHONE ENCOUNTER
Last appointment: 5/5/23  Next appointment: 11/7/23  Previous refill encounter(s): 3/17/23 #30    Requested Prescriptions     Pending Prescriptions Disp Refills    clonazePAM (KLONOPIN) 0.5 MG tablet [Pharmacy Med Name: CLONAZEPAM 0.5 MG Tablet] 30 tablet 0     Sig: TAKE 1 TABLET TWICE DAILY (MAX DAILY AMOUNT OF 1 MG)         For Pharmacy Admin Tracking Only    Program: Medication Refill  CPA in place:    Recommendation Provided To:    Intervention Detail: New Rx: 1, reason: Patient Preference  Intervention Accepted By:   Adrian Youngblood Closed?:    Time Spent (min): 5

## 2023-07-26 RX ORDER — CLONAZEPAM 0.5 MG/1
TABLET ORAL
Qty: 30 TABLET | Refills: 0 | Status: SHIPPED | OUTPATIENT
Start: 2023-07-26 | End: 2023-08-25

## 2023-08-01 NOTE — TELEPHONE ENCOUNTER
Patient called, stating pharmacy told her they do not have refills for patient's prescription   Losartan-hydrochlorothiazide    [facility] pharmacy    Patient's phone [phone]

## 2023-08-02 ENCOUNTER — TELEPHONE (OUTPATIENT)
Age: 78
End: 2023-08-02

## 2023-08-02 DIAGNOSIS — E78.2 MIXED HYPERLIPIDEMIA: Primary | ICD-10-CM

## 2023-08-02 RX ORDER — ATORVASTATIN CALCIUM 40 MG/1
40 TABLET, FILM COATED ORAL DAILY
Qty: 90 TABLET | Refills: 3 | Status: SHIPPED | OUTPATIENT
Start: 2023-08-02

## 2023-08-02 RX ORDER — LOSARTAN POTASSIUM AND HYDROCHLOROTHIAZIDE 12.5; 5 MG/1; MG/1
TABLET ORAL
Qty: 180 TABLET | Refills: 3 | Status: SHIPPED | OUTPATIENT
Start: 2023-08-02

## 2023-08-02 NOTE — TELEPHONE ENCOUNTER
Requested Prescriptions     Signed Prescriptions Disp Refills    atorvastatin (LIPITOR) 40 MG tablet 90 tablet 3     Sig: Take 1 tablet by mouth daily     Authorizing Provider: Casa Hensley     Ordering User: Zbigniew Ramos Maunie verbal order    Future Appointments   Date Time Provider [address] [place] [address]   11/7/2023  9:45 AM Pwaan Torres MD [facility] BS AMB   1/25/2024 10:20 AM Casa Hensley MD [facility] [address] BS AMB

## 2023-08-10 NOTE — TELEPHONE ENCOUNTER
Last appointment: 5/5/23  Next appointment: 11/7/23  Previous refill encounter(s): 4/12/23 #200 with 3 refills    Requested Prescriptions     Pending Prescriptions Disp Refills    diclofenac sodium (VOLTAREN) 1 % GEL [Pharmacy Med Name: DICLOFENAC SODIUM 1 % Gel] 200 g 3     Sig: APPLY 2 GRAMS TO AFFECTED AREA(S) FOUR TIMES DAILY. For Pharmacy Admin Tracking Only    Program: Medication Refill  CPA in place:    Recommendation Provided To:    Intervention Detail: New Rx: 1, reason: Patient Preference  Intervention Accepted By:   Creed  Closed?:    Time Spent (min): 5

## 2023-08-12 ENCOUNTER — APPOINTMENT (OUTPATIENT)
Facility: HOSPITAL | Age: 78
End: 2023-08-12
Payer: MEDICARE

## 2023-08-12 ENCOUNTER — CLINICAL DOCUMENTATION (OUTPATIENT)
Age: 78
End: 2023-08-12

## 2023-08-12 ENCOUNTER — HOSPITAL ENCOUNTER (EMERGENCY)
Facility: HOSPITAL | Age: 78
Discharge: HOME | End: 2023-08-12
Attending: EMERGENCY MEDICINE
Payer: MEDICARE

## 2023-08-12 VITALS
WEIGHT: 181 LBS | OXYGEN SATURATION: 100 % | DIASTOLIC BLOOD PRESSURE: 72 MMHG | BODY MASS INDEX: 33.31 KG/M2 | TEMPERATURE: 98.9 F | RESPIRATION RATE: 25 BRPM | HEIGHT: 62 IN | HEART RATE: 64 BPM | SYSTOLIC BLOOD PRESSURE: 133 MMHG

## 2023-08-12 DIAGNOSIS — Z79.01 ON CONTINUOUS ORAL ANTICOAGULATION: ICD-10-CM

## 2023-08-12 DIAGNOSIS — J81.0 ACUTE PULMONARY EDEMA (HCC): ICD-10-CM

## 2023-08-12 DIAGNOSIS — R06.00 ACUTE DYSPNEA: Primary | ICD-10-CM

## 2023-08-12 LAB
ALBUMIN SERPL-MCNC: 3.4 G/DL (ref 3.5–5)
ALBUMIN/GLOB SERPL: 0.9 (ref 1.1–2.2)
ALP SERPL-CCNC: 114 U/L (ref 45–117)
ALT SERPL-CCNC: 18 U/L (ref 12–78)
ANION GAP SERPL CALC-SCNC: 11 MMOL/L (ref 5–15)
APTT PPP: 26.5 SEC (ref 22.1–31)
AST SERPL-CCNC: 21 U/L (ref 15–37)
BASOPHILS # BLD: 0 K/UL (ref 0–0.1)
BASOPHILS NFR BLD: 1 % (ref 0–1)
BILIRUB SERPL-MCNC: 0.5 MG/DL (ref 0.2–1)
BUN SERPL-MCNC: 21 MG/DL (ref 6–20)
BUN/CREAT SERPL: 17 (ref 12–20)
CALCIUM SERPL-MCNC: 8.3 MG/DL (ref 8.5–10.1)
CHLORIDE SERPL-SCNC: 100 MMOL/L (ref 97–108)
CO2 SERPL-SCNC: 25 MMOL/L (ref 21–32)
CREAT SERPL-MCNC: 1.23 MG/DL (ref 0.55–1.02)
DIFFERENTIAL METHOD BLD: ABNORMAL
EKG ATRIAL RATE: 65 BPM
EKG DIAGNOSIS: NORMAL
EKG P AXIS: 209 DEGREES
EKG P-R INTERVAL: 146 MS
EKG Q-T INTERVAL: 440 MS
EKG QRS DURATION: 162 MS
EKG QTC CALCULATION (BAZETT): 450 MS
EKG R AXIS: 185 DEGREES
EKG T AXIS: 203 DEGREES
EKG VENTRICULAR RATE: 63 BPM
EOSINOPHIL # BLD: 0 K/UL (ref 0–0.4)
EOSINOPHIL NFR BLD: 0 % (ref 0–7)
ERYTHROCYTE [DISTWIDTH] IN BLOOD BY AUTOMATED COUNT: 15.4 % (ref 11.5–14.5)
GLOBULIN SER CALC-MCNC: 4 G/DL (ref 2–4)
GLUCOSE SERPL-MCNC: 94 MG/DL (ref 65–100)
HCT VFR BLD AUTO: 30.6 % (ref 35–47)
HGB BLD-MCNC: 9.7 G/DL (ref 11.5–16)
IMM GRANULOCYTES # BLD AUTO: 0 K/UL (ref 0–0.04)
IMM GRANULOCYTES NFR BLD AUTO: 0 % (ref 0–0.5)
INR PPP: 1.2 (ref 0.9–1.1)
LYMPHOCYTES # BLD: 0.7 K/UL (ref 0.8–3.5)
LYMPHOCYTES NFR BLD: 23 % (ref 12–49)
MAGNESIUM SERPL-MCNC: 2.1 MG/DL (ref 1.6–2.4)
MCH RBC QN AUTO: 26.5 PG (ref 26–34)
MCHC RBC AUTO-ENTMCNC: 31.7 G/DL (ref 30–36.5)
MCV RBC AUTO: 83.6 FL (ref 80–99)
MONOCYTES # BLD: 0.4 K/UL (ref 0–1)
MONOCYTES NFR BLD: 13 % (ref 5–13)
NEUTS SEG # BLD: 1.9 K/UL (ref 1.8–8)
NEUTS SEG NFR BLD: 63 % (ref 32–75)
NRBC # BLD: 0 K/UL (ref 0–0.01)
NRBC BLD-RTO: 0 PER 100 WBC
NT PRO BNP: 515 PG/ML (ref 0–450)
PLATELET # BLD AUTO: 212 K/UL (ref 150–400)
PMV BLD AUTO: 10 FL (ref 8.9–12.9)
POTASSIUM SERPL-SCNC: 3.7 MMOL/L (ref 3.5–5.1)
PROT SERPL-MCNC: 7.4 G/DL (ref 6.4–8.2)
PROTHROMBIN TIME: 11.6 SEC (ref 9–11.1)
RBC # BLD AUTO: 3.66 M/UL (ref 3.8–5.2)
RBC MORPH BLD: ABNORMAL
SODIUM SERPL-SCNC: 136 MMOL/L (ref 136–145)
THERAPEUTIC RANGE: NORMAL SECS (ref 58–77)
TROPONIN I SERPL HS-MCNC: 23 NG/L (ref 0–51)
WBC # BLD AUTO: 3 K/UL (ref 3.6–11)

## 2023-08-12 PROCEDURE — 83880 ASSAY OF NATRIURETIC PEPTIDE: CPT

## 2023-08-12 PROCEDURE — 96374 THER/PROPH/DIAG INJ IV PUSH: CPT

## 2023-08-12 PROCEDURE — 83735 ASSAY OF MAGNESIUM: CPT

## 2023-08-12 PROCEDURE — 84484 ASSAY OF TROPONIN QUANT: CPT

## 2023-08-12 PROCEDURE — 99285 EMERGENCY DEPT VISIT HI MDM: CPT

## 2023-08-12 PROCEDURE — 80053 COMPREHEN METABOLIC PANEL: CPT

## 2023-08-12 PROCEDURE — 85730 THROMBOPLASTIN TIME PARTIAL: CPT

## 2023-08-12 PROCEDURE — 93005 ELECTROCARDIOGRAM TRACING: CPT | Performed by: EMERGENCY MEDICINE

## 2023-08-12 PROCEDURE — 6360000002 HC RX W HCPCS: Performed by: EMERGENCY MEDICINE

## 2023-08-12 PROCEDURE — 71045 X-RAY EXAM CHEST 1 VIEW: CPT

## 2023-08-12 PROCEDURE — 85610 PROTHROMBIN TIME: CPT

## 2023-08-12 PROCEDURE — 85025 COMPLETE CBC W/AUTO DIFF WBC: CPT

## 2023-08-12 RX ORDER — FUROSEMIDE 10 MG/ML
20 INJECTION INTRAMUSCULAR; INTRAVENOUS ONCE
Status: COMPLETED | OUTPATIENT
Start: 2023-08-12 | End: 2023-08-12

## 2023-08-12 RX ORDER — FUROSEMIDE 20 MG/1
20 TABLET ORAL DAILY
Qty: 14 TABLET | Refills: 0 | Status: SHIPPED | OUTPATIENT
Start: 2023-08-12 | End: 2023-08-14 | Stop reason: ALTCHOICE

## 2023-08-12 RX ADMIN — FUROSEMIDE 20 MG: 10 INJECTION, SOLUTION INTRAMUSCULAR; INTRAVENOUS at 05:28

## 2023-08-12 ASSESSMENT — ENCOUNTER SYMPTOMS
DIARRHEA: 0
SORE THROAT: 0
RHINORRHEA: 0
EYE PAIN: 0
SHORTNESS OF BREATH: 1
NAUSEA: 0
ABDOMINAL PAIN: 0
COUGH: 0
VOMITING: 0

## 2023-08-12 ASSESSMENT — LIFESTYLE VARIABLES
HOW MANY STANDARD DRINKS CONTAINING ALCOHOL DO YOU HAVE ON A TYPICAL DAY: PATIENT DOES NOT DRINK
HOW OFTEN DO YOU HAVE A DRINK CONTAINING ALCOHOL: NEVER

## 2023-08-12 ASSESSMENT — PAIN - FUNCTIONAL ASSESSMENT: PAIN_FUNCTIONAL_ASSESSMENT: NONE - DENIES PAIN

## 2023-08-12 NOTE — PROGRESS NOTES
She has acute onset of SOB PTA when she got up to go to the bathroom. Denies chest pain, left arm pain, jaw pain, N/V.  Taking Eliquis, has pacemaker  Was in ER with mild interstitial edema  Dr Edson Yang in ER gave 20 mg lasix  She is on diovan HCT  I recommended lasix 40 mg qd  Will have office call her for follow up Monday with Dr Juma Dhillon or NP

## 2023-08-12 NOTE — DISCHARGE INSTRUCTIONS
Thank You! It was a pleasure taking care of you in our Emergency Department today. We know that when you come to [facility], you are entrusting us with your health, comfort, and safety. Our physicians and nurses honor that trust, and truly appreciate the opportunity to care for you and your loved ones. We also value your feedback. If you receive a survey about your Emergency Department experience today, please fill it out. We care about our patients' feedback, and we listen to what you have to say. Thank you. Dr. Robyn Horowitz M.D.      ____________________________________________________________________  I have included a copy of your lab results and/or radiologic studies from today's visit so you can have them easily available at your follow-up visit. We hope you feel better and please do not hesitate to contact the ED if you have any questions at all!     Recent Results (from the past 12 hour(s))   CBC with Auto Differential    Collection Time: 08/12/23  4:05 AM   Result Value Ref Range    WBC 3.0 (L) 3.6 - 11.0 K/uL    RBC 3.66 (L) 3.80 - 5.20 M/uL    Hemoglobin 9.7 (L) 11.5 - 16.0 g/dL    Hematocrit 30.6 (L) 35.0 - 47.0 %    MCV 83.6 80.0 - 99.0 FL    MCH 26.5 26.0 - 34.0 PG    MCHC 31.7 30.0 - 36.5 g/dL    RDW 15.4 (H) 11.5 - 14.5 %    Platelets 897 478 - 133 K/uL    MPV 10.0 8.9 - 12.9 FL    Nucleated RBCs 0.0 0  WBC    nRBC 0.00 0.00 - 0.01 K/uL    Neutrophils % 63 32 - 75 %    Lymphocytes % 23 12 - 49 %    Monocytes % 13 5 - 13 %    Eosinophils % 0 0 - 7 %    Basophils % 1 0 - 1 %    Immature Granulocytes 0 0.0 - 0.5 %    Neutrophils Absolute 1.9 1.8 - 8.0 K/UL    Lymphocytes Absolute 0.7 (L) 0.8 - 3.5 K/UL    Monocytes Absolute 0.4 0.0 - 1.0 K/UL    Eosinophils Absolute 0.0 0.0 - 0.4 K/UL    Basophils Absolute 0.0 0.0 - 0.1 K/UL    Absolute Immature Granulocyte 0.0 0.00 - 0.04 K/UL    Differential Type SMEAR SCANNED      RBC Comment SCHISTOCYTES  2+ take your discharge instructions with you when you go to your follow-up appointment. If a prescription has been provided, please have it filled as soon as possible to prevent a delay in treatment. Read the entire medication instruction sheet provided to you by the pharmacy. If you have any questions or reservations about taking the medication due to side effects or interactions with other medications, please call your primary care physician or contact the ER to speak with the charge nurse. Please make an appointment with your family doctor or the physician you were referred to for follow-up of this visit as instructed on your discharge paperwork. Return to the ER if you are unable to be seen or if you are unable to be seen in a timely manner. If you have any problem arranging the follow-up visit, contact the Emergency Department immediately.

## 2023-08-12 NOTE — ED NOTES
Discharge instructions were given to the patient by Padma Whitney. The patient left the Emergency Department ambulatory, alert and oriented and in no acute distress with 1 prescriptions. The patient was encouraged to call or return to the ED for worsening issues or problems and was encouraged to schedule a follow up appointment for continuing care. The patient verbalized understanding of discharge instructions and prescriptions, all questions were answered. The patient has no further concerns at this time.          Virginia Orosco RN  08/12/23 9613

## 2023-08-14 ENCOUNTER — OFFICE VISIT (OUTPATIENT)
Age: 78
End: 2023-08-14
Payer: MEDICARE

## 2023-08-14 VITALS
HEART RATE: 65 BPM | DIASTOLIC BLOOD PRESSURE: 72 MMHG | WEIGHT: 177.4 LBS | HEIGHT: 62 IN | SYSTOLIC BLOOD PRESSURE: 108 MMHG | OXYGEN SATURATION: 98 % | BODY MASS INDEX: 32.65 KG/M2

## 2023-08-14 DIAGNOSIS — I48.0 PAROXYSMAL ATRIAL FIBRILLATION (HCC): Primary | ICD-10-CM

## 2023-08-14 DIAGNOSIS — R06.09 DYSPNEA ON EXERTION: ICD-10-CM

## 2023-08-14 DIAGNOSIS — I10 PRIMARY HYPERTENSION: ICD-10-CM

## 2023-08-14 DIAGNOSIS — R53.83 EASY FATIGABILITY: ICD-10-CM

## 2023-08-14 LAB
EKG ATRIAL RATE: 61 BPM
EKG DIAGNOSIS: NORMAL
EKG P AXIS: 58 DEGREES
EKG P-R INTERVAL: 326 MS
EKG Q-T INTERVAL: 446 MS
EKG QRS DURATION: 82 MS
EKG QTC CALCULATION (BAZETT): 448 MS
EKG R AXIS: 61 DEGREES
EKG T AXIS: 100 DEGREES
EKG VENTRICULAR RATE: 61 BPM

## 2023-08-14 PROCEDURE — 1123F ACP DISCUSS/DSCN MKR DOCD: CPT | Performed by: NURSE PRACTITIONER

## 2023-08-14 PROCEDURE — G8399 PT W/DXA RESULTS DOCUMENT: HCPCS | Performed by: NURSE PRACTITIONER

## 2023-08-14 PROCEDURE — G8417 CALC BMI ABV UP PARAM F/U: HCPCS | Performed by: NURSE PRACTITIONER

## 2023-08-14 PROCEDURE — 99214 OFFICE O/P EST MOD 30 MIN: CPT | Performed by: NURSE PRACTITIONER

## 2023-08-14 PROCEDURE — 3078F DIAST BP <80 MM HG: CPT | Performed by: NURSE PRACTITIONER

## 2023-08-14 PROCEDURE — 3074F SYST BP LT 130 MM HG: CPT | Performed by: NURSE PRACTITIONER

## 2023-08-14 PROCEDURE — 93010 ELECTROCARDIOGRAM REPORT: CPT | Performed by: SPECIALIST

## 2023-08-14 PROCEDURE — 1090F PRES/ABSN URINE INCON ASSESS: CPT | Performed by: NURSE PRACTITIONER

## 2023-08-14 PROCEDURE — G8427 DOCREV CUR MEDS BY ELIG CLIN: HCPCS | Performed by: NURSE PRACTITIONER

## 2023-08-14 PROCEDURE — 1036F TOBACCO NON-USER: CPT | Performed by: NURSE PRACTITIONER

## 2023-08-14 RX ORDER — FUROSEMIDE 20 MG/1
20 TABLET ORAL DAILY
Qty: 30 TABLET | Refills: 1 | Status: SHIPPED | OUTPATIENT
Start: 2023-08-14

## 2023-08-14 ASSESSMENT — ENCOUNTER SYMPTOMS
NAUSEA: 0
COUGH: 0
VOMITING: 0
SHORTNESS OF BREATH: 1

## 2023-08-14 ASSESSMENT — PATIENT HEALTH QUESTIONNAIRE - PHQ9
SUM OF ALL RESPONSES TO PHQ QUESTIONS 1-9: 0
2. FEELING DOWN, DEPRESSED OR HOPELESS: 0
SUM OF ALL RESPONSES TO PHQ QUESTIONS 1-9: 0
SUM OF ALL RESPONSES TO PHQ9 QUESTIONS 1 & 2: 0
1. LITTLE INTEREST OR PLEASURE IN DOING THINGS: 0

## 2023-08-14 ASSESSMENT — VISUAL ACUITY: OU: 1

## 2023-08-14 NOTE — PROGRESS NOTES
Care Physician: Cj Sosa MD      [facility] DCPM DOI 06/09/16, on remote, NOT MRI Conditional     04/21/20 echo normal lvef, lvh, lae, mild mr, mild tr with pa pressure 44mm   Current Outpatient Medications   Medication Sig Dispense Refill    furosemide (LASIX) 20 MG tablet Take 1 tablet by mouth daily 14 tablet 0    diclofenac sodium (VOLTAREN) 1 % GEL APPLY 2 GRAMS TO AFFECTED AREA(S) FOUR TIMES DAILY. 200 g 1    losartan-hydroCHLOROthiazide (HYZAAR) 50-12.5 MG per tablet TAKE 2 TABLETS EVERY  tablet 3    atorvastatin (LIPITOR) 40 MG tablet Take 1 tablet by mouth daily 90 tablet 3    clonazePAM (KLONOPIN) 0.5 MG tablet TAKE 1 TABLET TWICE DAILY (MAX DAILY AMOUNT OF 1 MG) 30 tablet 0    Multiple Vitamin (MULTIVITAMIN ADULT PO) Take by mouth daily      sotalol (BETAPACE) 80 MG tablet TAKE 1 TABLET TWICE DAILY AS DIRECTED 180 tablet 1    apixaban (ELIQUIS) 5 MG TABS tablet Take 1 tablet by mouth 2 times daily 60 tablet 5    aspirin 81 MG EC tablet Take 1 tablet by mouth daily       No current facility-administered medications for this visit.      Allergies   Allergen Reactions    Penicillins Anaphylaxis and Hives     Just got Rocephin in ED, no reactions    Ace Inhibitors Cough    Niacin      Other reaction(s): Unable to Obtain     Past Medical History:   Diagnosis Date    Acquired cyst of kidney     CAD (coronary artery disease)     Cervical disc herniation     Clotting disorder ([address])     Essential hypertension     Hypercholesterolemia     Hypertension     Long term current use of anticoagulant therapy     Pacemaker     PAF (paroxysmal atrial fibrillation) ([address]) 6/9/2016    Paroxysmal supraventricular tachycardia ([address])     Postsurgical aortocoronary bypass status 3/20/2012    S/P cardiac pacemaker procedure 6/9/2016 6/9/16 [employer] dual chamber pacemaker implant    Stroke [facility])     Thyroid disease     Urge incontinence      Family History   Problem Relation Age of Onset    Heart Disease Brother

## 2023-08-16 RX ORDER — ATORVASTATIN CALCIUM 40 MG/1
TABLET, FILM COATED ORAL
Qty: 90 TABLET | OUTPATIENT
Start: 2023-08-16

## 2023-08-16 RX ORDER — SOTALOL HYDROCHLORIDE 80 MG/1
TABLET ORAL
Qty: 180 TABLET | OUTPATIENT
Start: 2023-08-16

## 2023-08-17 ENCOUNTER — HOSPITAL ENCOUNTER (OUTPATIENT)
Facility: HOSPITAL | Age: 78
Discharge: HOME | End: 2023-08-19
Payer: MEDICARE

## 2023-08-17 DIAGNOSIS — I48.0 PAROXYSMAL ATRIAL FIBRILLATION (HCC): ICD-10-CM

## 2023-08-17 DIAGNOSIS — R06.09 DYSPNEA ON EXERTION: ICD-10-CM

## 2023-08-17 DIAGNOSIS — R53.83 EASY FATIGABILITY: ICD-10-CM

## 2023-08-17 LAB
ECHO AO ROOT DIAM: 2.6 CM
ECHO AV AREA PLAN: 2.6 CM2
ECHO EST RA PRESSURE: 5 MMHG
ECHO LA DIAMETER: 4.5 CM
ECHO LA TO AORTIC ROOT RATIO: 1.73
ECHO LA VOL 4C: 81 ML (ref 22–52)
ECHO LV EDV A4C: 81 ML
ECHO LV EJECTION FRACTION A4C: 48 %
ECHO LV ESV A4C: 42 ML
ECHO LV FRACTIONAL SHORTENING: 30 % (ref 28–44)
ECHO LV INTERNAL DIMENSION DIASTOLIC: 4.7 CM (ref 3.9–5.3)
ECHO LV INTERNAL DIMENSION SYSTOLIC: 3.3 CM
ECHO LV IVSD: 1.1 CM (ref 0.6–0.9)
ECHO LV MASS 2D: 199.6 G (ref 67–162)
ECHO LV POSTERIOR WALL DIASTOLIC: 1.2 CM (ref 0.6–0.9)
ECHO LV RELATIVE WALL THICKNESS RATIO: 0.51
ECHO LVOT AREA: 3.1 CM2
ECHO LVOT DIAM: 2 CM
ECHO LVOT PEAK GRADIENT: 3 MMHG
ECHO LVOT PEAK VELOCITY: 0.8 M/S
ECHO MV A VELOCITY: 0.41 M/S
ECHO MV AREA PHT: 4.5 CM2
ECHO MV E DECELERATION TIME (DT): 159.2 MS
ECHO MV E VELOCITY: 0.26 M/S
ECHO MV E/A RATIO: 0.63
ECHO MV MAX VELOCITY: 0.6 M/S
ECHO MV MEAN GRADIENT: 1 MMHG
ECHO MV MEAN VELOCITY: 0.3 M/S
ECHO MV PEAK GRADIENT: 2 MMHG
ECHO MV PRESSURE HALF TIME (PHT): 48.7 MS
ECHO MV REGURGITANT PEAK GRADIENT: 88 MMHG
ECHO MV REGURGITANT PEAK VELOCITY: 4.7 M/S
ECHO MV VTI: 16.7 CM
ECHO PULMONARY ARTERY END DIASTOLIC PRESSURE: 15 MMHG
ECHO RIGHT VENTRICULAR SYSTOLIC PRESSURE (RVSP): 35 MMHG
ECHO TV REGURGITANT MAX VELOCITY: 2.75 M/S
ECHO TV REGURGITANT PEAK GRADIENT: 31 MMHG

## 2023-08-17 PROCEDURE — 93306 TTE W/DOPPLER COMPLETE: CPT

## 2023-09-07 ENCOUNTER — OFFICE VISIT (OUTPATIENT)
Age: 78
End: 2023-09-07
Payer: MEDICARE

## 2023-09-07 VITALS
BODY MASS INDEX: 33.68 KG/M2 | OXYGEN SATURATION: 100 % | HEIGHT: 62 IN | SYSTOLIC BLOOD PRESSURE: 136 MMHG | HEART RATE: 60 BPM | WEIGHT: 183 LBS | DIASTOLIC BLOOD PRESSURE: 72 MMHG

## 2023-09-07 DIAGNOSIS — I10 ESSENTIAL (PRIMARY) HYPERTENSION: ICD-10-CM

## 2023-09-07 DIAGNOSIS — I48.0 PAF (PAROXYSMAL ATRIAL FIBRILLATION) (HCC): ICD-10-CM

## 2023-09-07 DIAGNOSIS — Z95.0 S/P CARDIAC PACEMAKER PROCEDURE: ICD-10-CM

## 2023-09-07 DIAGNOSIS — Z95.1 S/P CABG X 4: ICD-10-CM

## 2023-09-07 DIAGNOSIS — E78.2 MIXED HYPERLIPIDEMIA: ICD-10-CM

## 2023-09-07 DIAGNOSIS — I25.10 CORONARY ARTERY DISEASE INVOLVING NATIVE CORONARY ARTERY OF NATIVE HEART WITHOUT ANGINA PECTORIS: Primary | ICD-10-CM

## 2023-09-07 PROCEDURE — 3078F DIAST BP <80 MM HG: CPT | Performed by: SPECIALIST

## 2023-09-07 PROCEDURE — 1090F PRES/ABSN URINE INCON ASSESS: CPT | Performed by: SPECIALIST

## 2023-09-07 PROCEDURE — G8417 CALC BMI ABV UP PARAM F/U: HCPCS | Performed by: SPECIALIST

## 2023-09-07 PROCEDURE — 1123F ACP DISCUSS/DSCN MKR DOCD: CPT | Performed by: SPECIALIST

## 2023-09-07 PROCEDURE — G8399 PT W/DXA RESULTS DOCUMENT: HCPCS | Performed by: SPECIALIST

## 2023-09-07 PROCEDURE — 99213 OFFICE O/P EST LOW 20 MIN: CPT | Performed by: SPECIALIST

## 2023-09-07 PROCEDURE — 1036F TOBACCO NON-USER: CPT | Performed by: SPECIALIST

## 2023-09-07 PROCEDURE — 3075F SYST BP GE 130 - 139MM HG: CPT | Performed by: SPECIALIST

## 2023-09-07 PROCEDURE — G8427 DOCREV CUR MEDS BY ELIG CLIN: HCPCS | Performed by: SPECIALIST

## 2023-09-07 ASSESSMENT — PATIENT HEALTH QUESTIONNAIRE - PHQ9
SUM OF ALL RESPONSES TO PHQ QUESTIONS 1-9: 0
2. FEELING DOWN, DEPRESSED OR HOPELESS: 0
SUM OF ALL RESPONSES TO PHQ9 QUESTIONS 1 & 2: 0
SUM OF ALL RESPONSES TO PHQ QUESTIONS 1-9: 0
SUM OF ALL RESPONSES TO PHQ QUESTIONS 1-9: 0
1. LITTLE INTEREST OR PLEASURE IN DOING THINGS: 0
SUM OF ALL RESPONSES TO PHQ QUESTIONS 1-9: 0

## 2023-09-07 NOTE — PROGRESS NOTES
HISTORY OF PRESENT ILLNESS  Javed Prajapati is a 77 y.o. female     SUMMARY:   Patient Active Problem List   Diagnosis    Bilateral carotid artery stenosis    PAF (paroxysmal atrial fibrillation) ([facility])    Transient ischemic attack involving left internal carotid artery    S/P cardiac pacemaker procedure    Pacemaker    TIA (transient ischemic attack)    Hypertension    Coronary atherosclerosis of native coronary artery    First degree AV block    Prediabetes    Advanced care planning/counseling discussion    Cervical disc herniation    SSS (sick sinus syndrome) ([facility])    Dyslipidemia    Urge incontinence    Chronic ischemic heart disease    Encounter for long-term (current) use of medications    Paroxysmal supraventricular tachycardia ([address])    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    Mixed hyperlipidemia    Postsurgical aortocoronary bypass status    Bradycardia, sinus    APC (atrial premature contractions)    S/P CABG x 4    Chronic renal disease, stage III ([facility])            CARDIOLOGY STUDIES TO DATE:  [facility] DCPM DOI 06/09/16, on remote, NOT MRI Conditional     04/21/20 echo normal lvef, lvh, lae, mild mr, mild tr with pa pressure 44mm    8/23 echo normal lvef, lvh, lae, mild tr, mild to mod pul regurg    Chief Complaint   Patient presents with    Hypertension       HPI :  She is doing well and has had no more episodes. She continues to walk almost every day plus working at the [employer] but she can retire in December. Blood pressure is well controlled she is having no problems with her medications and her most recent lipid profile looked excellent.     CARDIAC ROS:   negative for chest pain, dyspnea, lower extremity edema, orthopnea, paroxysmal nocturnal dyspnea, and syncope    Family History   Problem Relation Age of Onset    Heart Disease Brother     Heart Disease Sister     Diabetes Brother     Cancer Sister         breast    Alcohol Abuse Father     Stroke Father     Asthma Mother     Hypertension

## 2023-09-25 ENCOUNTER — OFFICE VISIT (OUTPATIENT)
Age: 78
End: 2023-09-25
Payer: MEDICARE

## 2023-09-25 VITALS
HEART RATE: 63 BPM | DIASTOLIC BLOOD PRESSURE: 70 MMHG | SYSTOLIC BLOOD PRESSURE: 113 MMHG | RESPIRATION RATE: 16 BRPM | TEMPERATURE: 97.3 F | BODY MASS INDEX: 33.86 KG/M2 | OXYGEN SATURATION: 98 % | HEIGHT: 62 IN | WEIGHT: 184 LBS

## 2023-09-25 DIAGNOSIS — I48.0 PAF (PAROXYSMAL ATRIAL FIBRILLATION) (HCC): ICD-10-CM

## 2023-09-25 DIAGNOSIS — E78.2 MIXED HYPERLIPIDEMIA: Primary | ICD-10-CM

## 2023-09-25 DIAGNOSIS — I25.9 CHRONIC ISCHEMIC HEART DISEASE, UNSPECIFIED: ICD-10-CM

## 2023-09-25 DIAGNOSIS — N18.31 CHRONIC KIDNEY DISEASE, STAGE 3A (HCC): ICD-10-CM

## 2023-09-25 DIAGNOSIS — Z95.0 PRESENCE OF CARDIAC PACEMAKER: ICD-10-CM

## 2023-09-25 DIAGNOSIS — I49.5 SICK SINUS SYNDROME (HCC): ICD-10-CM

## 2023-09-25 DIAGNOSIS — I10 ESSENTIAL (PRIMARY) HYPERTENSION: ICD-10-CM

## 2023-09-25 PROCEDURE — 3074F SYST BP LT 130 MM HG: CPT | Performed by: FAMILY MEDICINE

## 2023-09-25 PROCEDURE — G8399 PT W/DXA RESULTS DOCUMENT: HCPCS | Performed by: FAMILY MEDICINE

## 2023-09-25 PROCEDURE — G8427 DOCREV CUR MEDS BY ELIG CLIN: HCPCS | Performed by: FAMILY MEDICINE

## 2023-09-25 PROCEDURE — 1090F PRES/ABSN URINE INCON ASSESS: CPT | Performed by: FAMILY MEDICINE

## 2023-09-25 PROCEDURE — 99214 OFFICE O/P EST MOD 30 MIN: CPT | Performed by: FAMILY MEDICINE

## 2023-09-25 PROCEDURE — 1123F ACP DISCUSS/DSCN MKR DOCD: CPT | Performed by: FAMILY MEDICINE

## 2023-09-25 PROCEDURE — 1036F TOBACCO NON-USER: CPT | Performed by: FAMILY MEDICINE

## 2023-09-25 PROCEDURE — 3078F DIAST BP <80 MM HG: CPT | Performed by: FAMILY MEDICINE

## 2023-09-25 PROCEDURE — G8417 CALC BMI ABV UP PARAM F/U: HCPCS | Performed by: FAMILY MEDICINE

## 2023-09-25 RX ORDER — ATORVASTATIN CALCIUM 40 MG/1
40 TABLET, FILM COATED ORAL DAILY
Qty: 90 TABLET | Refills: 3 | Status: SHIPPED | OUTPATIENT
Start: 2023-09-25

## 2023-09-25 RX ORDER — LOSARTAN POTASSIUM AND HYDROCHLOROTHIAZIDE 12.5; 5 MG/1; MG/1
TABLET ORAL
Qty: 180 TABLET | Refills: 3 | Status: SHIPPED | OUTPATIENT
Start: 2023-09-25

## 2023-09-25 RX ORDER — SOTALOL HYDROCHLORIDE 80 MG/1
TABLET ORAL
Qty: 180 TABLET | Refills: 1 | Status: CANCELLED | OUTPATIENT
Start: 2023-09-25

## 2023-09-25 RX ORDER — FUROSEMIDE 20 MG/1
20 TABLET ORAL DAILY
Qty: 90 TABLET | Refills: 3 | Status: SHIPPED | OUTPATIENT
Start: 2023-09-25

## 2023-09-25 SDOH — ECONOMIC STABILITY: FOOD INSECURITY: WITHIN THE PAST 12 MONTHS, THE FOOD YOU BOUGHT JUST DIDN'T LAST AND YOU DIDN'T HAVE MONEY TO GET MORE.: NEVER TRUE

## 2023-09-25 SDOH — ECONOMIC STABILITY: HOUSING INSECURITY
IN THE LAST 12 MONTHS, WAS THERE A TIME WHEN YOU DID NOT HAVE A STEADY PLACE TO SLEEP OR SLEPT IN A SHELTER (INCLUDING NOW)?: NO

## 2023-09-25 SDOH — ECONOMIC STABILITY: FOOD INSECURITY: WITHIN THE PAST 12 MONTHS, YOU WORRIED THAT YOUR FOOD WOULD RUN OUT BEFORE YOU GOT MONEY TO BUY MORE.: NEVER TRUE

## 2023-09-25 SDOH — ECONOMIC STABILITY: INCOME INSECURITY: HOW HARD IS IT FOR YOU TO PAY FOR THE VERY BASICS LIKE FOOD, HOUSING, MEDICAL CARE, AND HEATING?: NOT HARD AT ALL

## 2023-09-25 ASSESSMENT — ANXIETY QUESTIONNAIRES
5. BEING SO RESTLESS THAT IT IS HARD TO SIT STILL: 0
4. TROUBLE RELAXING: 0
IF YOU CHECKED OFF ANY PROBLEMS ON THIS QUESTIONNAIRE, HOW DIFFICULT HAVE THESE PROBLEMS MADE IT FOR YOU TO DO YOUR WORK, TAKE CARE OF THINGS AT HOME, OR GET ALONG WITH OTHER PEOPLE: NOT DIFFICULT AT ALL
1. FEELING NERVOUS, ANXIOUS, OR ON EDGE: 0
GAD7 TOTAL SCORE: 0
3. WORRYING TOO MUCH ABOUT DIFFERENT THINGS: 0
2. NOT BEING ABLE TO STOP OR CONTROL WORRYING: 0
7. FEELING AFRAID AS IF SOMETHING AWFUL MIGHT HAPPEN: 0
6. BECOMING EASILY ANNOYED OR IRRITABLE: 0

## 2023-09-25 ASSESSMENT — PATIENT HEALTH QUESTIONNAIRE - PHQ9
SUM OF ALL RESPONSES TO PHQ QUESTIONS 1-9: 0
SUM OF ALL RESPONSES TO PHQ QUESTIONS 1-9: 0
SUM OF ALL RESPONSES TO PHQ9 QUESTIONS 1 & 2: 0
SUM OF ALL RESPONSES TO PHQ QUESTIONS 1-9: 0
1. LITTLE INTEREST OR PLEASURE IN DOING THINGS: 0
SUM OF ALL RESPONSES TO PHQ QUESTIONS 1-9: 0
2. FEELING DOWN, DEPRESSED OR HOPELESS: 0

## 2023-09-25 NOTE — PROGRESS NOTES
Jackie Garcia (: 1945) is a 77 y.o. female, established patient, here for evaluation of the following chief complaint(s):  Hypertension (Follow-up)       ASSESSMENT/PLAN:  Neo Mcknight was seen today for hypertension. Diagnoses and all orders for this visit:    Mixed hyperlipidemia-stable, continue Lipitor  -     atorvastatin (LIPITOR) 40 MG tablet; Take 1 tablet by mouth daily    Essential (primary) hypertension-controlled, continue current meds  -     losartan-hydroCHLOROthiazide (HYZAAR) 50-12.5 MG per tablet; TAKE 2 TABLETS EVERY DAY    PAF (paroxysmal atrial fibrillation) (HCC)-rate controlled with sotalol, anticoagulated with Eliquis  -     apixaban (ELIQUIS) 5 MG TABS tablet; Take 1 tablet by mouth 2 times daily    Chronic ischemic heart disease, unspecified-stable, following with cardiology    Sick sinus syndrome ([address])  Presence of cardiac pacemaker  -Stable, following with EP    Chronic kidney disease, stage 3a ([address])  -monitoring, GFR up slightly from last check and will need new labs at next appointment. Encouraged avoiding NSAIDs, eating healthy diet and working towards weight loss, maintaining good HTN control, hydration.  -No recent renal imaging so can plan for US retroperitoneal and labs including CBC, CMP, TSH, PTH, urine microalbumin/cr at next appointment  -Did have a previous RICHARDSON in past so may need to reevaluate this    Other orders  -     furosemide (LASIX) 20 MG tablet; Take 1 tablet by mouth daily    Return in about 3 months (around 2023), or if symptoms worsen or fail to improve. Subjective:   Pt is a 77 y.o. female with PMH sig for HTN, HLD, PAF, SSS s/p pacemaker, CAD s/p 4vCABG, hx of pyelonephritis with ureteral stent for hydronephrosis, and right renal cyst here for routine follow up on chronic medical conditions    Colonoscopy last in 2018 with Dr. Dilma Christian and had 4 mm tubular adenoma on path. Declines new colonoscopy at this point.     Following with Dr. Raven Gonzalez

## 2023-09-25 NOTE — PROGRESS NOTES
Chief Complaint   Patient presents with    Hypertension     Follow-up    1. Have you been to the ER, urgent care clinic since your last visit? Hospitalized since your last visit? No    2. Have you seen or consulted any other health care providers outside of the [address] [place] [address] since your last visit? Include any pap smears or colon screening.  No

## 2023-11-02 ENCOUNTER — TELEPHONE (OUTPATIENT)
Age: 78
End: 2023-11-02

## 2023-11-02 NOTE — TELEPHONE ENCOUNTER
Attempted to reach patient by telephone. A message was left for return call. Patient returned my call and was informed that she needed to bring updated tax information,insurance cards and any source of income to our office and fill out new updated form and we will fax updated information and copy of new re enrollment form to [facility] to re enroll for Eliquis for 2024. Patient verbalized understanding and states she will look for her 2022 tax info to bring in.

## 2023-11-16 ENCOUNTER — TELEPHONE (OUTPATIENT)
Age: 78
End: 2023-11-16

## 2023-11-16 NOTE — TELEPHONE ENCOUNTER
Spoke with Sanjuana Alicea regarding [employer] application for patient assistance for Eliquis. Leola Dixon stated they did receive the ferny but for 2024 Medicare patients-they will have to apply for the [employer] government funded program by calling the social security office to apply. If patient does not qualify for the [employer] program,patient will need a denial letter from [employer] and then bring that denial letter to this office which will then fax the denial letter to [facility] before reviewing the patient's application for Eliquis. Spoke with [employer] after two identifiers and explained the process to her. She will need to call the social security office and ask to apply for [employer] government assistance and if denied,bring this office the denial letter which we will fax to [facility]. Pt verbalized understanding and plans to call today.

## 2023-12-13 ENCOUNTER — TELEPHONE (OUTPATIENT)
Age: 78
End: 2023-12-13

## 2023-12-13 NOTE — TELEPHONE ENCOUNTER
Attempted to reach patient by telephone  to follow up on Patient Assistance form for Eliquis. A message was left for return call.

## 2023-12-13 NOTE — TELEPHONE ENCOUNTER
My call was returned by Carnetti Banks re Eliquis application renewal.  Pt states she did speak with a representative from [facility] and they were to call her back in regards to getting her an approval or denial letter for me to fax in the [employer].  She states so far she has not heard anything-I suggested she call them back.She states she does have 3 month supply of Eliquis at this time.  Pt verbalized understanding with no further questions.

## 2024-01-08 ENCOUNTER — OFFICE VISIT (OUTPATIENT)
Age: 79
End: 2024-01-08
Payer: COMMERCIAL

## 2024-01-08 VITALS
HEIGHT: 62 IN | DIASTOLIC BLOOD PRESSURE: 82 MMHG | BODY MASS INDEX: 34.16 KG/M2 | SYSTOLIC BLOOD PRESSURE: 130 MMHG | OXYGEN SATURATION: 100 % | WEIGHT: 185.63 LBS | RESPIRATION RATE: 16 BRPM | HEART RATE: 58 BPM | TEMPERATURE: 97.3 F

## 2024-01-08 DIAGNOSIS — N18.31 CHRONIC KIDNEY DISEASE, STAGE 3A (HCC): ICD-10-CM

## 2024-01-08 DIAGNOSIS — I10 ESSENTIAL (PRIMARY) HYPERTENSION: Primary | ICD-10-CM

## 2024-01-08 DIAGNOSIS — I49.5 SICK SINUS SYNDROME (HCC): ICD-10-CM

## 2024-01-08 PROCEDURE — 3079F DIAST BP 80-89 MM HG: CPT | Performed by: STUDENT IN AN ORGANIZED HEALTH CARE EDUCATION/TRAINING PROGRAM

## 2024-01-08 PROCEDURE — 99213 OFFICE O/P EST LOW 20 MIN: CPT | Performed by: STUDENT IN AN ORGANIZED HEALTH CARE EDUCATION/TRAINING PROGRAM

## 2024-01-08 PROCEDURE — 3075F SYST BP GE 130 - 139MM HG: CPT | Performed by: STUDENT IN AN ORGANIZED HEALTH CARE EDUCATION/TRAINING PROGRAM

## 2024-01-08 PROCEDURE — 1123F ACP DISCUSS/DSCN MKR DOCD: CPT | Performed by: STUDENT IN AN ORGANIZED HEALTH CARE EDUCATION/TRAINING PROGRAM

## 2024-01-08 RX ORDER — LOSARTAN POTASSIUM AND HYDROCHLOROTHIAZIDE 12.5; 5 MG/1; MG/1
TABLET ORAL
Qty: 180 TABLET | Refills: 3
Start: 2024-01-08

## 2024-01-08 ASSESSMENT — PATIENT HEALTH QUESTIONNAIRE - PHQ9
1. LITTLE INTEREST OR PLEASURE IN DOING THINGS: 0
SUM OF ALL RESPONSES TO PHQ QUESTIONS 1-9: 0
SUM OF ALL RESPONSES TO PHQ9 QUESTIONS 1 & 2: 0
SUM OF ALL RESPONSES TO PHQ QUESTIONS 1-9: 0
2. FEELING DOWN, DEPRESSED OR HOPELESS: 0
SUM OF ALL RESPONSES TO PHQ QUESTIONS 1-9: 0
SUM OF ALL RESPONSES TO PHQ QUESTIONS 1-9: 0

## 2024-01-08 NOTE — PROGRESS NOTES
Chief Complaint   Patient presents with    Follow-up     2023 Hypertension    Shortness of Breath     X 2 weeks       Former Patient of Dr. Hedrick    Patient stated when she tries to go for a walk now she gets short of breath. Denies any chest pain.    \"Have you been to the ER, urgent care clinic since your last visit?  Hospitalized since your last visit?\"    NO    “Have you seen or consulted any other health care providers outside of [facility] since your last visit?”      Yes  2023  Cardiology   Dr. Jose Schroeder           There were no vitals filed for this visit.  Health Maintenance Due   Topic Date Due    Respiratory Syncytial Virus (RSV) Pregnant or age 60 yrs+ (1 - 1-dose 60+ series) Never done    Pneumococcal 65+ years Vaccine (2 - PCV) 10/21/2020    COVID-19 Vaccine ( - - season) 2023    Annual Wellness Visit (Medicare Advantage)  Never done      The patient, Carnetti E Banks, identity was verified by name and , pharmacy verified  Labs:Yes  Fasting:No-Hp Labs

## 2024-01-08 NOTE — PROGRESS NOTES
[facility]  [address] [place] [address] [phone]    Chief Complaint: Chronic medical problems    Subjective  Carnetti E Banks is a 78 y.o. Black /  female , established patient, here for evaluation of the concern(s) above;    PMH sig for HTN, HLD, PAF, SSS s/p pacemaker, CAD s/p 4vCABG, hx of pyelonephritis with ureteral stent for hydronephrosis, and right renal cyst here for routine follow up on chronic medical conditions.     Hyperlipidemia & HTN  Pt is doing well on current meds with no medication side effects noted  No new myalgias, no joint pains, no weakness  Exercises regularly with walking.  Compliant with diabetic diet.  Avoids sodas and sweet teas.  Avoids fast food.  Limits carbs.  Pt is a non smoker.    PAF, SSS s/p pacemaker, CAD s/p 4vCABG:    Following with Dr. Saleem for general cardiac care and Dr. Juarez for EP.  No new sx.  Denies orthopnea or PND.  Denies any chest pain, shortness of breath, dyspnea on exertion.  Denies any stroke-like symptoms.  Did have a TIA type presentation back in 4/2020 with some numbness and tingling in her right hand.      Colonoscopy last in 2018 with Dr. Gonzales and had 4 mm tubular adenoma on path.  Declines new colonoscopy at this point.    Social Hx:  Retired from [employer] after 54 years. Retired 12/2023.  Has 4 children; 2 daughters and 2 sons.       Allergies - reviewed:   Allergies   Allergen Reactions    Penicillins Anaphylaxis and Hives     Just got Rocephin in ED, no reactions    Ace Inhibitors Cough    Niacin      Other reaction(s): Unable to Obtain       Past Medical History - reviewed:  Past Medical History:   Diagnosis Date    Acquired cyst of kidney     CAD (coronary artery disease)     Cervical disc herniation     Clotting disorder (HCC)     Essential hypertension     Hypercholesterolemia     Hypertension     Long term current use of anticoagulant therapy     Pacemaker     PAF

## 2024-01-25 ENCOUNTER — OFFICE VISIT (OUTPATIENT)
Age: 79
End: 2024-01-25
Payer: COMMERCIAL

## 2024-01-25 VITALS
OXYGEN SATURATION: 99 % | WEIGHT: 188 LBS | HEART RATE: 60 BPM | SYSTOLIC BLOOD PRESSURE: 128 MMHG | DIASTOLIC BLOOD PRESSURE: 80 MMHG | HEIGHT: 62 IN | BODY MASS INDEX: 34.6 KG/M2

## 2024-01-25 DIAGNOSIS — E78.2 MIXED HYPERLIPIDEMIA: ICD-10-CM

## 2024-01-25 DIAGNOSIS — R06.02 SOB (SHORTNESS OF BREATH): ICD-10-CM

## 2024-01-25 DIAGNOSIS — Z95.0 PACEMAKER: ICD-10-CM

## 2024-01-25 DIAGNOSIS — I48.0 PAF (PAROXYSMAL ATRIAL FIBRILLATION) (HCC): ICD-10-CM

## 2024-01-25 DIAGNOSIS — I10 PRIMARY HYPERTENSION: Primary | ICD-10-CM

## 2024-01-25 DIAGNOSIS — I25.10 ATHEROSCLEROSIS OF NATIVE CORONARY ARTERY OF NATIVE HEART WITHOUT ANGINA PECTORIS: ICD-10-CM

## 2024-01-25 PROCEDURE — 1123F ACP DISCUSS/DSCN MKR DOCD: CPT | Performed by: SPECIALIST

## 2024-01-25 PROCEDURE — 99214 OFFICE O/P EST MOD 30 MIN: CPT | Performed by: SPECIALIST

## 2024-01-25 PROCEDURE — 3074F SYST BP LT 130 MM HG: CPT | Performed by: SPECIALIST

## 2024-01-25 PROCEDURE — 3079F DIAST BP 80-89 MM HG: CPT | Performed by: SPECIALIST

## 2024-02-01 ENCOUNTER — CLINICAL DOCUMENTATION (OUTPATIENT)
Age: 79
End: 2024-02-01

## 2024-02-01 ENCOUNTER — TELEPHONE (OUTPATIENT)
Age: 79
End: 2024-02-01

## 2024-02-01 NOTE — TELEPHONE ENCOUNTER
Patient returned my call and stated ironically that she had recently just gotten off the phone with [employer] regarding the letter she needs to bring this office to fax to [facility] to receive her Eliquis.She stated she does have enough Eliquis to last the month of February.

## 2024-02-01 NOTE — TELEPHONE ENCOUNTER
Attempted to reach patient by telephone. A message was left for return call regarding Eliquis and if [employer] site sent her either a denial or approval letter for Eliquis.

## 2024-03-05 ENCOUNTER — HOSPITAL ENCOUNTER (OUTPATIENT)
Facility: HOSPITAL | Age: 79
Discharge: HOME | End: 2024-03-08
Attending: SPECIALIST
Payer: MEDICARE

## 2024-03-05 ENCOUNTER — HOSPITAL ENCOUNTER (OUTPATIENT)
Facility: HOSPITAL | Age: 79
Discharge: HOME | End: 2024-03-07
Attending: SPECIALIST
Payer: MEDICARE

## 2024-03-05 VITALS — SYSTOLIC BLOOD PRESSURE: 149 MMHG | HEART RATE: 60 BPM | RESPIRATION RATE: 16 BRPM | DIASTOLIC BLOOD PRESSURE: 69 MMHG

## 2024-03-05 DIAGNOSIS — I25.10 ATHEROSCLEROSIS OF NATIVE CORONARY ARTERY OF NATIVE HEART WITHOUT ANGINA PECTORIS: ICD-10-CM

## 2024-03-05 DIAGNOSIS — I10 PRIMARY HYPERTENSION: ICD-10-CM

## 2024-03-05 DIAGNOSIS — I48.0 PAF (PAROXYSMAL ATRIAL FIBRILLATION) (HCC): ICD-10-CM

## 2024-03-05 DIAGNOSIS — E78.2 MIXED HYPERLIPIDEMIA: ICD-10-CM

## 2024-03-05 DIAGNOSIS — R06.02 SOB (SHORTNESS OF BREATH): ICD-10-CM

## 2024-03-05 DIAGNOSIS — Z95.0 PACEMAKER: ICD-10-CM

## 2024-03-05 LAB
STRESS BASELINE DIAS BP: 69 MMHG
STRESS BASELINE HR: 60 BPM
STRESS BASELINE SYS BP: 149 MMHG
STRESS ESTIMATED WORKLOAD: 1 METS
STRESS O2 SAT REST: 100 %
STRESS PEAK DIAS BP: 59 MMHG
STRESS PEAK SYS BP: 153 MMHG
STRESS PERCENT HR ACHIEVED: 54 %
STRESS POST PEAK HR: 76 BPM
STRESS RATE PRESSURE PRODUCT: NORMAL BPM*MMHG
STRESS STAGE 1 BP: NORMAL MMHG
STRESS STAGE 1 DURATION: 1 MIN:SEC
STRESS STAGE 1 HR: 62 BPM
STRESS STAGE 2 BP: NORMAL MMHG
STRESS STAGE 2 DURATION: 3 MIN:SEC
STRESS STAGE 2 HR: 76 BPM
STRESS STAGE RECOVERY 1 BP: NORMAL MMHG
STRESS STAGE RECOVERY 1 DURATION: 4 MIN:SEC
STRESS STAGE RECOVERY 1 HR: 70 BPM
STRESS TARGET HR: 141 BPM

## 2024-03-05 PROCEDURE — 6360000002 HC RX W HCPCS: Performed by: SPECIALIST

## 2024-03-05 PROCEDURE — A9500 TC99M SESTAMIBI: HCPCS | Performed by: SPECIALIST

## 2024-03-05 PROCEDURE — 93017 CV STRESS TEST TRACING ONLY: CPT

## 2024-03-05 PROCEDURE — 3430000000 HC RX DIAGNOSTIC RADIOPHARMACEUTICAL: Performed by: SPECIALIST

## 2024-03-05 RX ORDER — TETRAKIS(2-METHOXYISOBUTYLISOCYANIDE)COPPER(I) TETRAFLUOROBORATE 1 MG/ML
30 INJECTION, POWDER, LYOPHILIZED, FOR SOLUTION INTRAVENOUS ONCE
Status: COMPLETED | OUTPATIENT
Start: 2024-03-05 | End: 2024-03-05

## 2024-03-05 RX ORDER — REGADENOSON 0.08 MG/ML
0.4 INJECTION, SOLUTION INTRAVENOUS
Status: COMPLETED | OUTPATIENT
Start: 2024-03-05 | End: 2024-03-05

## 2024-03-05 RX ORDER — TETRAKIS(2-METHOXYISOBUTYLISOCYANIDE)COPPER(I) TETRAFLUOROBORATE 1 MG/ML
10 INJECTION, POWDER, LYOPHILIZED, FOR SOLUTION INTRAVENOUS ONCE
Status: COMPLETED | OUTPATIENT
Start: 2024-03-05 | End: 2024-03-05

## 2024-03-05 RX ADMIN — TECHNETIUM TC-99M SESTAMIBI 30 MILLICURIE: 1 INJECTION INTRAVENOUS at 10:45

## 2024-03-05 RX ADMIN — REGADENOSON 0.4 MG: 0.08 INJECTION, SOLUTION INTRAVENOUS at 10:45

## 2024-03-05 RX ADMIN — TECHNETIUM TC-99M SESTAMIBI 10 MILLICURIE: 1 INJECTION INTRAVENOUS at 07:55

## 2024-03-07 ENCOUNTER — TELEPHONE (OUTPATIENT)
Age: 79
End: 2024-03-07

## 2024-03-07 NOTE — TELEPHONE ENCOUNTER
Patient brought in copy of letter from [employer] for financial assistance re Eliquis.  Faxed with original request today and error occurred,faxed a second time,waiting for confirmation fax.    Noted as well that these papers were previously filled out and sent in Nov 2023 and she just recently received the approval letter for more assistance this week.  As a result,they may request updated financial info and if that is the case I will notify her and have her bring updated infor for 2024.

## 2024-03-14 ENCOUNTER — TELEPHONE (OUTPATIENT)
Age: 79
End: 2024-03-14

## 2024-03-14 NOTE — TELEPHONE ENCOUNTER
Spoke with Ade at [employer] regarding a follow up on Ms. Cadena's Patient Assistance Form that was re-faxed with requested letter from [employer].  Per Ade since Ms Reza is approved by Medicare for [employer] assistance,she is denied financial assistance through [employer].  Patient will need to contact her Medicare prescription drug plan for future assistance.    Spoke with pt after 2 identifiers.  Relayed message from [employer] that her financial assistance through their company is denied as she can now get assistance from her Medicare Prescription plan called [employer].  Pt has copy of letter and states she will call to make sure this is all set up for her Eliquis.  Pt verbalized understanding with no further questions.

## 2024-04-18 ENCOUNTER — TELEPHONE (OUTPATIENT)
Age: 79
End: 2024-04-18

## 2024-04-18 DIAGNOSIS — I48.0 PAF (PAROXYSMAL ATRIAL FIBRILLATION) (HCC): Primary | ICD-10-CM

## 2024-04-18 RX ORDER — SOTALOL HYDROCHLORIDE 80 MG/1
TABLET ORAL
Qty: 180 TABLET | Refills: 3 | Status: SHIPPED | OUTPATIENT
Start: 2024-04-18

## 2024-04-18 NOTE — TELEPHONE ENCOUNTER
Requested Prescriptions     Signed Prescriptions Disp Refills    sotalol (BETAPACE) 80 MG tablet 180 tablet 3     Sig: TAKE 1 TABLET TWICE DAILY AS DIRECTED     Authorizing Provider: EDWARD SALEEM III     Ordering User: MEETA BERNAL      Future Appointments   Date Time Provider Department Center   7/10/2024 10:00 AM Harshal Lockwood MD [facility] BS AMB   7/25/2024 10:00 AM [facility]      Per Verbal Order by MD/NP

## 2024-04-18 NOTE — TELEPHONE ENCOUNTER
Refill needed:    sotalol (BETAPACE) 80 MG tablet      Please send to:    [facility]  [address]    [phone]      Thanks

## 2024-07-10 ENCOUNTER — OFFICE VISIT (OUTPATIENT)
Age: 79
End: 2024-07-10
Payer: MEDICARE

## 2024-07-10 VITALS
HEIGHT: 62 IN | HEART RATE: 60 BPM | WEIGHT: 195.11 LBS | DIASTOLIC BLOOD PRESSURE: 60 MMHG | TEMPERATURE: 97.5 F | SYSTOLIC BLOOD PRESSURE: 104 MMHG | OXYGEN SATURATION: 98 % | RESPIRATION RATE: 16 BRPM | BODY MASS INDEX: 35.9 KG/M2

## 2024-07-10 DIAGNOSIS — E78.2 MIXED HYPERLIPIDEMIA: ICD-10-CM

## 2024-07-10 DIAGNOSIS — I10 PRIMARY HYPERTENSION: ICD-10-CM

## 2024-07-10 DIAGNOSIS — N18.31 CKD STAGE 3A, GFR 45-59 ML/MIN (HCC): ICD-10-CM

## 2024-07-10 DIAGNOSIS — F51.01 PRIMARY INSOMNIA: ICD-10-CM

## 2024-07-10 DIAGNOSIS — E66.01 SEVERE OBESITY (BMI 35.0-39.9) WITH COMORBIDITY (HCC): ICD-10-CM

## 2024-07-10 DIAGNOSIS — R73.03 PREDIABETES: ICD-10-CM

## 2024-07-10 DIAGNOSIS — Z00.00 MEDICARE ANNUAL WELLNESS VISIT, SUBSEQUENT: Primary | ICD-10-CM

## 2024-07-10 PROCEDURE — 3078F DIAST BP <80 MM HG: CPT | Performed by: STUDENT IN AN ORGANIZED HEALTH CARE EDUCATION/TRAINING PROGRAM

## 2024-07-10 PROCEDURE — 3074F SYST BP LT 130 MM HG: CPT | Performed by: STUDENT IN AN ORGANIZED HEALTH CARE EDUCATION/TRAINING PROGRAM

## 2024-07-10 PROCEDURE — 99214 OFFICE O/P EST MOD 30 MIN: CPT | Performed by: STUDENT IN AN ORGANIZED HEALTH CARE EDUCATION/TRAINING PROGRAM

## 2024-07-10 PROCEDURE — 1123F ACP DISCUSS/DSCN MKR DOCD: CPT | Performed by: STUDENT IN AN ORGANIZED HEALTH CARE EDUCATION/TRAINING PROGRAM

## 2024-07-10 PROCEDURE — G0439 PPPS, SUBSEQ VISIT: HCPCS | Performed by: STUDENT IN AN ORGANIZED HEALTH CARE EDUCATION/TRAINING PROGRAM

## 2024-07-10 RX ORDER — TRAZODONE HYDROCHLORIDE 50 MG/1
50 TABLET ORAL NIGHTLY
Qty: 90 TABLET | Refills: 0 | Status: SHIPPED | OUTPATIENT
Start: 2024-07-10

## 2024-07-10 RX ORDER — TRAZODONE HYDROCHLORIDE 50 MG/1
50 TABLET ORAL NIGHTLY
Qty: 90 TABLET | Refills: 0 | Status: SHIPPED | OUTPATIENT
Start: 2024-07-10 | End: 2024-07-10 | Stop reason: SDUPTHER

## 2024-07-10 ASSESSMENT — PATIENT HEALTH QUESTIONNAIRE - PHQ9
SUM OF ALL RESPONSES TO PHQ QUESTIONS 1-9: 0
SUM OF ALL RESPONSES TO PHQ9 QUESTIONS 1 & 2: 0
1. LITTLE INTEREST OR PLEASURE IN DOING THINGS: NOT AT ALL
2. FEELING DOWN, DEPRESSED OR HOPELESS: NOT AT ALL

## 2024-07-10 NOTE — ASSESSMENT & PLAN NOTE
Discussed sleep hygiene. Pt will try melatonin first for about 2-4 weeks.   If not effective, then will start taking Trazodone.

## 2024-07-10 NOTE — ASSESSMENT & PLAN NOTE
-Avoid anti-inflammatory medicines like ibuprofen and aleve.    -Work on your weight with diet and exercise.    -Stay well hydrated with ~5-6 glasses of water per day.    -Maximize blood pressure and/or diabetes management  -Avoid smoking

## 2024-07-10 NOTE — PROGRESS NOTES
[facility]  [address] [place] [address] [phone]    Chief Complaint: Chronic medical problems    Subjective  Carnetti E Banks is a 79 y.o. Black /  female , established patient, here for evaluation of the concern(s) above;    PMH sig for HTN, HLD, PAF, SSS s/p pacemaker, CAD s/p 4vCABG, hx of pyelonephritis with ureteral stent for hydronephrosis, and right renal cyst here for routine follow up on chronic medical conditions.     Hyperlipidemia & HTN  Pt is doing well on current meds with no medication side effects noted  No new myalgias, no joint pains, no weakness  Exercises regularly with walking.  Compliant with diabetic diet.  Avoids sodas and sweet teas.  Avoids fast food.  Limits carbs.  Pt is a non smoker.    Insomnia:  States that she sleeps maximum of 4 hours at night and stays up all night thereafter.  This is ongoing problem.    PAF, SSS s/p pacemaker, CAD s/p 4vCABG:    Following with Dr. Saleem for general cardiac care and Dr. Juarez for EP.  No new sx.  Denies orthopnea or PND.  Denies any chest pain, shortness of breath, dyspnea on exertion.  Denies any stroke-like symptoms.  Did have a TIA type presentation back in 4/2020 with some numbness and tingling in her right hand.      Colonoscopy last in 2018 with Dr. Gonzales and had 4 mm tubular adenoma on path. Declines new colonoscopy at this point.    Social Hx:  Retired from [employer] after 54 years. Retired 12/2023.  Has 4 children; 2 daughters and 2 sons.       Allergies - reviewed:   Allergies   Allergen Reactions    Penicillins Anaphylaxis and Hives     Just got Rocephin in ED, no reactions    Ace Inhibitors Cough    Niacin      Other reaction(s): Unable to Obtain       Past Medical History - reviewed:  Past Medical History:   Diagnosis Date    Acquired cyst of kidney     CAD (coronary artery disease)     Cervical disc herniation     Chronic renal disease, stage III (HCC)

## 2024-07-10 NOTE — PROGRESS NOTES
Medicare Annual Wellness Visit    Carnetti E Banks is here for Follow-up (6 Months 1/8/2024 Hypertension) and Medicare AWV    Assessment & Plan   Medicare annual wellness visit, subsequent  Primary hypertension  Assessment & Plan:   Well-controlled, continue current medications  Mixed hyperlipidemia  -     Lipid Panel; Future  CKD stage 3a, GFR 45-59 ml/min (HCC)  Assessment & Plan:  -Avoid anti-inflammatory medicines like ibuprofen and aleve.    -Work on your weight with diet and exercise.    -Stay well hydrated with ~5-6 glasses of water per day.    -Maximize blood pressure and/or diabetes management  -Avoid smoking    Orders:  -     Basic Metabolic Panel; Future  Severe obesity (BMI 35.0-39.9) with comorbidity (HCC)  Prediabetes  -     Hemoglobin A1C; Future  Primary insomnia  Assessment & Plan:  Discussed sleep hygiene. Pt will try melatonin first for about 2-4 weeks.   If not effective, then will start taking Trazodone.  Orders:  -     traZODone (DESYREL) 50 MG tablet; Take 1 tablet by mouth nightly, Disp-90 tablet, R-0Print    Recommendations for Preventive Services Due: see orders and patient instructions/AVS.  Recommended screening schedule for the next 5-10 years is provided to the patient in written form: see Patient Instructions/AVS.     Return in 6 months (on 1/10/2025).     Subjective       Patient's complete Health Risk Assessment and screening values have been reviewed and are found in Flowsheets. The following problems were reviewed today and where indicated follow up appointments were made and/or referrals ordered.    Positive Risk Factor Screenings with Interventions:                Abnormal BMI (obese):  Body mass index is 35.69 kg/m². (!) Abnormal  Interventions - Obesity:  low carbohydrate diet, exercise for at least 150 minutes/week           Safety:  Do you have non-slip mats or non-slip surfaces or shower bars or grab bars in your shower or bathtub?: (!) No (Getting a new walk in

## 2024-07-10 NOTE — PROGRESS NOTES
Chief Complaint   Patient presents with    Follow-up     6 Months 2024 Hypertension    Medicare AWV     \"Have you been to the ER, urgent care clinic since your last visit?  Hospitalized since your last visit?\"    NO    “Have you seen or consulted any other health care providers outside of [facility] since your last visit?”      Yes  2024  López Juarez MD   [facility]           Vitals:    07/10/24 0931   BP: 104/60   Pulse: 60   Resp: 16   Temp: 97.5 °F (36.4 °C)   SpO2: 98%     Health Maintenance Due   Topic Date Due    Respiratory Syncytial Virus (RSV) Pregnant or age 60 yrs+ (1 - 1-dose 60+ series) Never done    Pneumococcal 65+ years Vaccine (2 of 2 - PCV) 10/21/2020    COVID-19 Vaccine (4 - - season) 2023    Annual Wellness Visit (Medicare Advantage)  Never done    Lipids  2024      The patient, Carnetti E Reza, identity was verified by name and , pharmacy verified  Labs:Yes  Fasting:Yes

## 2024-07-11 LAB
BUN SERPL-MCNC: 27 MG/DL (ref 8–27)
BUN/CREAT SERPL: 18 (ref 12–28)
CALCIUM SERPL-MCNC: 8.9 MG/DL (ref 8.7–10.3)
CHLORIDE SERPL-SCNC: 104 MMOL/L (ref 96–106)
CHOLEST SERPL-MCNC: 138 MG/DL (ref 100–199)
CO2 SERPL-SCNC: 25 MMOL/L (ref 20–29)
CREAT SERPL-MCNC: 1.53 MG/DL (ref 0.57–1)
EGFRCR SERPLBLD CKD-EPI 2021: 34 ML/MIN/1.73
GLUCOSE SERPL-MCNC: 88 MG/DL (ref 70–99)
HBA1C MFR BLD: 5.9 % (ref 4.8–5.6)
HDLC SERPL-MCNC: 38 MG/DL
LDLC SERPL CALC-MCNC: 82 MG/DL (ref 0–99)
POTASSIUM SERPL-SCNC: 3.3 MMOL/L (ref 3.5–5.2)
REPORT: NORMAL
SODIUM SERPL-SCNC: 144 MMOL/L (ref 134–144)
TRIGL SERPL-MCNC: 96 MG/DL (ref 0–149)
VLDLC SERPL CALC-MCNC: 18 MG/DL (ref 5–40)

## 2024-07-25 ENCOUNTER — OFFICE VISIT (OUTPATIENT)
Age: 79
End: 2024-07-25
Payer: MEDICARE

## 2024-07-25 VITALS
DIASTOLIC BLOOD PRESSURE: 62 MMHG | HEIGHT: 62 IN | SYSTOLIC BLOOD PRESSURE: 104 MMHG | BODY MASS INDEX: 36.14 KG/M2 | OXYGEN SATURATION: 99 % | WEIGHT: 196.4 LBS | HEART RATE: 60 BPM

## 2024-07-25 DIAGNOSIS — I48.0 PAF (PAROXYSMAL ATRIAL FIBRILLATION) (HCC): ICD-10-CM

## 2024-07-25 DIAGNOSIS — I25.10 CORONARY ARTERY DISEASE INVOLVING NATIVE CORONARY ARTERY OF NATIVE HEART WITHOUT ANGINA PECTORIS: Primary | ICD-10-CM

## 2024-07-25 DIAGNOSIS — I10 ESSENTIAL (PRIMARY) HYPERTENSION: ICD-10-CM

## 2024-07-25 DIAGNOSIS — E78.2 MIXED HYPERLIPIDEMIA: ICD-10-CM

## 2024-07-25 DIAGNOSIS — Z95.1 S/P CABG X 4: ICD-10-CM

## 2024-07-25 DIAGNOSIS — Z95.0 PACEMAKER: ICD-10-CM

## 2024-07-25 PROCEDURE — 3074F SYST BP LT 130 MM HG: CPT | Performed by: SPECIALIST

## 2024-07-25 PROCEDURE — 3078F DIAST BP <80 MM HG: CPT | Performed by: SPECIALIST

## 2024-07-25 PROCEDURE — 99214 OFFICE O/P EST MOD 30 MIN: CPT | Performed by: SPECIALIST

## 2024-07-25 PROCEDURE — 1123F ACP DISCUSS/DSCN MKR DOCD: CPT | Performed by: SPECIALIST

## 2024-07-25 RX ORDER — FUROSEMIDE 20 MG/1
20 TABLET ORAL DAILY
Qty: 90 TABLET | Refills: 3 | Status: SHIPPED | OUTPATIENT
Start: 2024-07-25

## 2024-07-25 NOTE — PROGRESS NOTES
HISTORY OF PRESENT ILLNESS  Carnetti E Banks is a 79 y.o. female     SUMMARY:   Patient Active Problem List   Diagnosis    Bilateral carotid artery stenosis    PAF (paroxysmal atrial fibrillation) ([facility])    Transient ischemic attack involving left internal carotid artery    S/P cardiac pacemaker procedure    Pacemaker    TIA (transient ischemic attack)    Hypertension    Coronary atherosclerosis of native coronary artery    First degree AV block    Prediabetes    Advanced care planning/counseling discussion    Cervical disc herniation    SSS (sick sinus syndrome) ([facility])    Dyslipidemia    Urge incontinence    Chronic ischemic heart disease    Encounter for long-term (current) use of medications    Paroxysmal supraventricular tachycardia ([facility])    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 Abhishek Gilliam--HARDIN-LAD,SVG D1/D2, SVG PDA-RCA    [facility] DCPM DOI 06/09/16, on remote, NOT MRI Conditional     04/21/20 echo normal lvef, lvh, lae, mild mr, mild tr with pa pressure 44mm    8/23 echo normal lvef, lvh, lae, mild tr, mild to mod pul regurg    1/24 neg lexiscan    Chief Complaint   Patient presents with    Medication Refill     Lasix and Eliquis    Coronary Artery Disease       HPI :  Overall she is doing quite well.  She still has some shortness of breath with activity which is unchanged and I reassured her that her recent stress test looked great.  That being said she is not exercising a lot and she is put on a little weight.  Blood pressure is well-controlled and recent lipid profile looked excellent.  She has not felt any A-fib.    CARDIAC ROS:   negative for chest pain, claudication, lower extremity edema, orthopnea, paroxysmal nocturnal dyspnea, and

## 2024-07-31 ENCOUNTER — TELEPHONE (OUTPATIENT)
Age: 79
End: 2024-07-31

## 2024-07-31 NOTE — TELEPHONE ENCOUNTER
Patient states she gave nurse some important paperwork and was told that the nurse didn't receive them but she did give them to her and would like a call back.      Phone [phone]

## 2024-07-31 NOTE — TELEPHONE ENCOUNTER
Returned patient call after 2 identifiers.    Reminded patient that in March 2024 [employer] denied her for patient assistance as she has a Medicare plan that should cover her Eliquis. She states she does have 3 months worth of Eliquis but I encouraged her to call her insurance ASAP to inquire about the Medicare prescription plan called \"Extra Help\".  Pt stated in March that she has a copy of the denial letter from [employer]. She states she will find it and call Medicare today. I asked that she please give me a call for follow up as to what she finds out.  Pt verbalized understanding with no further questions.

## 2024-08-07 ENCOUNTER — CLINICAL DOCUMENTATION (OUTPATIENT)
Age: 79
End: 2024-08-07

## 2024-08-07 DIAGNOSIS — I10 ESSENTIAL (PRIMARY) HYPERTENSION: ICD-10-CM

## 2024-08-07 DIAGNOSIS — E87.6 LOW BLOOD POTASSIUM: Primary | ICD-10-CM

## 2024-08-07 RX ORDER — POTASSIUM CHLORIDE 20 MEQ/1
20 TABLET, EXTENDED RELEASE ORAL DAILY
Qty: 90 TABLET | Refills: 0 | Status: SHIPPED | OUTPATIENT
Start: 2024-08-07

## 2024-08-07 RX ORDER — LOSARTAN POTASSIUM 100 MG/1
100 TABLET ORAL DAILY
Qty: 90 TABLET | Refills: 1 | Status: SHIPPED | OUTPATIENT
Start: 2024-08-07

## 2024-08-07 NOTE — PROGRESS NOTES
Called patient and discussed results.  States that cardiologist started on the lasix after she recently saw them.  -I will switch hyzaar to losartan only.  - will send potassium supplements.    We discussed the expected course, resolution and complications of the diagnosis(es) in detail.  Medication risks, benefits, costs, interactions, and alternatives were discussed as indicated.  I advised to contact the office if condition worsens, changes or fails to improve as anticipated. Pt expressed understanding with the diagnosis(es) and plan. Patient understands that this encounter was a temporary measure, and the importance of further follow up and examination was emphasized.  Patient verbalized understanding.      Signed By: Harshal Lockwood MD     August 7, 2024

## 2024-09-14 DIAGNOSIS — E78.2 MIXED HYPERLIPIDEMIA: ICD-10-CM

## 2024-09-16 RX ORDER — ATORVASTATIN CALCIUM 40 MG/1
40 TABLET, FILM COATED ORAL DAILY
Qty: 90 TABLET | Refills: 3 | Status: SHIPPED | OUTPATIENT
Start: 2024-09-16

## 2024-10-05 DIAGNOSIS — F51.01 PRIMARY INSOMNIA: ICD-10-CM

## 2024-10-06 DIAGNOSIS — E87.6 LOW BLOOD POTASSIUM: ICD-10-CM

## 2024-10-07 RX ORDER — TRAZODONE HYDROCHLORIDE 50 MG/1
50 TABLET, FILM COATED ORAL NIGHTLY
Qty: 90 TABLET | Refills: 1 | Status: SHIPPED | OUTPATIENT
Start: 2024-10-07

## 2024-10-07 RX ORDER — POTASSIUM CHLORIDE 1500 MG/1
20 TABLET, EXTENDED RELEASE ORAL DAILY
Qty: 90 TABLET | Refills: 1 | Status: SHIPPED | OUTPATIENT
Start: 2024-10-07

## 2024-10-07 NOTE — TELEPHONE ENCOUNTER
Last appointment: 7/10/24  Next appointment: 1/10/25  Previous refill encounter(s): 8/7/24 #90    Requested Prescriptions     Pending Prescriptions Disp Refills    potassium chloride (KLOR-CON M20) 20 MEQ extended release tablet [Pharmacy Med Name: KLOR-CON M20 TABLET] 90 tablet 1     Sig: TAKE 1 TABLET BY MOUTH EVERY DAY         For Pharmacy Admin Tracking Only    Program: Medication Refill  CPA in place:    Recommendation Provided To:   Intervention Detail: New Rx: 1, reason: Patient Preference  Intervention Accepted By:   Gap Closed?:    Time Spent (min): 5

## 2024-10-07 NOTE — TELEPHONE ENCOUNTER
Last appointment: 7/10/24  Next appointment: 1/10/25  Previous refill encounter(s): 7/10/24 #90    Requested Prescriptions     Pending Prescriptions Disp Refills    traZODone (DESYREL) 50 MG tablet [Pharmacy Med Name: TRAZODONE 50 MG TABLET] 90 tablet 1     Sig: TAKE 1 TABLET BY MOUTH EVERY DAY AT NIGHT         For Pharmacy Admin Tracking Only    Program: Medication Refill  CPA in place:    Recommendation Provided To:   Intervention Detail: New Rx: 1, reason: Patient Preference  Intervention Accepted By:   Gap Closed?:    Time Spent (min): 5

## 2024-10-08 ENCOUNTER — TELEPHONE (OUTPATIENT)
Age: 79
End: 2024-10-08

## 2024-10-08 NOTE — TELEPHONE ENCOUNTER
Patient is requesting a RX refill   losartan (COZAAR) 100 MG tablet   apixaban (ELIQUIS) 5 MG TABS tablet she can be reached @ [phone]

## 2024-12-18 DIAGNOSIS — I10 ESSENTIAL (PRIMARY) HYPERTENSION: ICD-10-CM

## 2024-12-19 RX ORDER — LOSARTAN POTASSIUM 100 MG/1
100 TABLET ORAL DAILY
Qty: 90 TABLET | Refills: 1 | Status: SHIPPED | OUTPATIENT
Start: 2024-12-19

## 2024-12-19 NOTE — TELEPHONE ENCOUNTER
Last appointment: 7/10/24  Next appointment: 1/10/25  Previous refill encounter(s): 8/7/24 #90 with 1 refill    Requested Prescriptions     Pending Prescriptions Disp Refills    losartan (COZAAR) 100 MG tablet [Pharmacy Med Name: LOSARTAN POTASSIUM 100 MG TAB] 90 tablet 1     Sig: TAKE 1 TABLET BY MOUTH EVERY DAY         For Pharmacy Admin Tracking Only    Program: Medication Refill  CPA in place:    Recommendation Provided To:   Intervention Detail: New Rx: 1, reason: Patient Preference  Intervention Accepted By:   Gap Closed?:    Time Spent (min): 5

## 2025-01-07 ENCOUNTER — OFFICE VISIT (OUTPATIENT)
Age: 80
End: 2025-01-07
Payer: MEDICARE

## 2025-01-07 VITALS
HEIGHT: 62 IN | OXYGEN SATURATION: 98 % | BODY MASS INDEX: 36.1 KG/M2 | SYSTOLIC BLOOD PRESSURE: 118 MMHG | WEIGHT: 196.2 LBS | HEART RATE: 63 BPM | DIASTOLIC BLOOD PRESSURE: 60 MMHG

## 2025-01-07 DIAGNOSIS — I48.0 PAF (PAROXYSMAL ATRIAL FIBRILLATION) (HCC): ICD-10-CM

## 2025-01-07 DIAGNOSIS — Z95.0 PACEMAKER: ICD-10-CM

## 2025-01-07 DIAGNOSIS — I10 ESSENTIAL (PRIMARY) HYPERTENSION: ICD-10-CM

## 2025-01-07 DIAGNOSIS — I25.10 CORONARY ARTERY DISEASE INVOLVING NATIVE CORONARY ARTERY OF NATIVE HEART WITHOUT ANGINA PECTORIS: Primary | ICD-10-CM

## 2025-01-07 DIAGNOSIS — E78.2 MIXED HYPERLIPIDEMIA: ICD-10-CM

## 2025-01-07 DIAGNOSIS — Z95.1 S/P CABG X 4: ICD-10-CM

## 2025-01-07 PROCEDURE — 3078F DIAST BP <80 MM HG: CPT | Performed by: SPECIALIST

## 2025-01-07 PROCEDURE — 1123F ACP DISCUSS/DSCN MKR DOCD: CPT | Performed by: SPECIALIST

## 2025-01-07 PROCEDURE — 3074F SYST BP LT 130 MM HG: CPT | Performed by: SPECIALIST

## 2025-01-07 PROCEDURE — 99214 OFFICE O/P EST MOD 30 MIN: CPT | Performed by: SPECIALIST

## 2025-01-07 PROCEDURE — 1126F AMNT PAIN NOTED NONE PRSNT: CPT | Performed by: SPECIALIST

## 2025-01-07 PROCEDURE — 1160F RVW MEDS BY RX/DR IN RCRD: CPT | Performed by: SPECIALIST

## 2025-01-07 PROCEDURE — 1159F MED LIST DOCD IN RCRD: CPT | Performed by: SPECIALIST

## 2025-01-07 ASSESSMENT — PATIENT HEALTH QUESTIONNAIRE - PHQ9
SUM OF ALL RESPONSES TO PHQ QUESTIONS 1-9: 0
2. FEELING DOWN, DEPRESSED OR HOPELESS: NOT AT ALL
SUM OF ALL RESPONSES TO PHQ QUESTIONS 1-9: 0
SUM OF ALL RESPONSES TO PHQ9 QUESTIONS 1 & 2: 0
SUM OF ALL RESPONSES TO PHQ QUESTIONS 1-9: 0
1. LITTLE INTEREST OR PLEASURE IN DOING THINGS: NOT AT ALL
SUM OF ALL RESPONSES TO PHQ QUESTIONS 1-9: 0

## 2025-01-07 NOTE — PROGRESS NOTES
HISTORY OF PRESENT ILLNESS  Carnetti E Banks is a 79 y.o. female     SUMMARY:   Patient Active Problem List   Diagnosis    Bilateral carotid artery stenosis    PAF (paroxysmal atrial fibrillation) ([facility])    Transient ischemic attack involving left internal carotid artery    S/P cardiac pacemaker procedure    Pacemaker    TIA (transient ischemic attack)    Hypertension    Coronary atherosclerosis of native coronary artery    First degree AV block    Prediabetes    Advanced care planning/counseling discussion    Cervical disc herniation    SSS (sick sinus syndrome) ([facility])    Dyslipidemia    Urge incontinence    Chronic ischemic heart disease    Encounter for long-term (current) use of medications    Paroxysmal supraventricular tachycardia ([facility])    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 Abhishek Gilliam--HARDIN-LAD,SVG D1/D2, SVG PDA-RCA    BSC DCPM DOI 06/09/16, on remote, NOT MRI Conditional     04/21/20 echo normal lvef, lvh, lae, mild mr, mild tr with pa pressure 44mm    8/23 echo normal lvef, lvh, lae, mild tr, mild to mod pul regurg    1/24 neg lexiscan    Chief Complaint   Patient presents with    6 Month Follow-Up     Coronary artery disease involving native coronary artery of native heart without angina pectoris    Atrial Fibrillation    Hypertension    Hyperlipidemia       HPI :  She is doing well with no cardiac complaints or problems with her medications.  She is somewhat active but does not exercise, weight is stable and blood pressure is well-controlled.  She still complaining of some dyspnea on exertion.  We had done a stress test on her over the summer for those complaints and it looked good.  Looking back at her labs in the summer 2023 she

## 2025-01-07 NOTE — PROGRESS NOTES
1. Have you been to the ER, urgent care clinic since your last visit?  Hospitalized since your last visit?No    2. Have you seen or consulted any other health care providers outside of the [facility] since your last visit?  Include any pap smears or colon screening. No

## 2025-01-10 ENCOUNTER — TELEMEDICINE (OUTPATIENT)
Age: 80
End: 2025-01-10
Payer: MEDICARE

## 2025-01-10 DIAGNOSIS — N18.31 CKD STAGE 3A, GFR 45-59 ML/MIN (HCC): ICD-10-CM

## 2025-01-10 DIAGNOSIS — I10 ESSENTIAL (PRIMARY) HYPERTENSION: Primary | ICD-10-CM

## 2025-01-10 DIAGNOSIS — M25.561 ACUTE PAIN OF RIGHT KNEE: ICD-10-CM

## 2025-01-10 PROCEDURE — 1159F MED LIST DOCD IN RCRD: CPT | Performed by: STUDENT IN AN ORGANIZED HEALTH CARE EDUCATION/TRAINING PROGRAM

## 2025-01-10 PROCEDURE — 99214 OFFICE O/P EST MOD 30 MIN: CPT | Performed by: STUDENT IN AN ORGANIZED HEALTH CARE EDUCATION/TRAINING PROGRAM

## 2025-01-10 PROCEDURE — 1123F ACP DISCUSS/DSCN MKR DOCD: CPT | Performed by: STUDENT IN AN ORGANIZED HEALTH CARE EDUCATION/TRAINING PROGRAM

## 2025-01-10 RX ORDER — LOSARTAN POTASSIUM 50 MG/1
50 TABLET ORAL DAILY
Qty: 90 TABLET | Refills: 1 | Status: SHIPPED | OUTPATIENT
Start: 2025-01-10

## 2025-01-10 SDOH — ECONOMIC STABILITY: FOOD INSECURITY: WITHIN THE PAST 12 MONTHS, THE FOOD YOU BOUGHT JUST DIDN'T LAST AND YOU DIDN'T HAVE MONEY TO GET MORE.: NEVER TRUE

## 2025-01-10 SDOH — ECONOMIC STABILITY: FOOD INSECURITY: WITHIN THE PAST 12 MONTHS, YOU WORRIED THAT YOUR FOOD WOULD RUN OUT BEFORE YOU GOT MONEY TO BUY MORE.: NEVER TRUE

## 2025-01-10 ASSESSMENT — PATIENT HEALTH QUESTIONNAIRE - PHQ9
SUM OF ALL RESPONSES TO PHQ9 QUESTIONS 1 & 2: 0
SUM OF ALL RESPONSES TO PHQ QUESTIONS 1-9: 0
1. LITTLE INTEREST OR PLEASURE IN DOING THINGS: NOT AT ALL
SUM OF ALL RESPONSES TO PHQ QUESTIONS 1-9: 0
SUM OF ALL RESPONSES TO PHQ QUESTIONS 1-9: 0
2. FEELING DOWN, DEPRESSED OR HOPELESS: NOT AT ALL
SUM OF ALL RESPONSES TO PHQ QUESTIONS 1-9: 0

## 2025-01-10 NOTE — ASSESSMENT & PLAN NOTE
Chronic, at goal (stable),  will lower dose of losartan given concerns of blood BP. Gave parameters to adjust at home if necessary and call clinic if any further concerns.

## 2025-01-10 NOTE — ASSESSMENT & PLAN NOTE
Tylenol as needed for pain. Avoid Ibuprofen.  Discussed home stretching exercises.  In person evaluation if persistent.

## 2025-01-10 NOTE — PROGRESS NOTES
Chief Complaint   Patient presents with    Follow-up       \"Have you been to the ER, urgent care clinic since your last visit?  Hospitalized since your last visit?\"    NO    “Have you seen or consulted any other health care providers outside of [facility] since your last visit?”    NO            Click Here for Release of Records Request     No results found for this visit on 01/10/25.   There were no vitals filed for this visit.   Health Maintenance Due   Topic Date Due    DTaP/Tdap/Td vaccine (1 - Tdap) Never done    Shingles vaccine (1 of 2) Never done    Respiratory Syncytial Virus (RSV) Pregnant or age 60 yrs+ (1 - 1-dose 75+ series) Never done    Pneumococcal 65+ years Vaccine (2 of 2 - PCV) 10/21/2020    Flu vaccine (1) 2024    COVID-19 Vaccine (4 - - season) 2024    Annual Wellness Visit (Medicare Advantage)  2025        The patient, Carnetti E Banks, identity was verified by name and .   
expressed understanding with the diagnosis(es) and plan. Patient understands that this encounter was a temporary measure, and the importance of further follow up and examination was emphasized.  Patient verbalized understanding.           Carnetti E Banks is being evaluated by a Virtual Visit (video visit) encounter to address concerns as mentioned above.  A caregiver was present when appropriate. Due to this being a TeleHealth encounter (During COVID-19 public health emergency), evaluation of the following organ systems was limited: Vitals/Constitutional/EENT/Resp/CV/GI//MS/Neuro/Skin/Heme-Lymph-Imm.  Pursuant to the emergency declaration under the [place] Act and the National Emergencies Act, 1135 waiver authority and the Coronavirus Preparedness and Response Supplemental Appropriations Act, this Virtual Visit was conducted with patient's (and/or legal guardian's) consent, to reduce the patient's risk of exposure to COVID-19 and provide necessary medical care.  The patient (and/or legal guardian) has also been advised to contact this office for worsening conditions or problems, and seek emergency medical treatment and/or call 911 if deemed necessary.     Patient identification was verified at the start of the visit: YES     Services were provided through a video synchronous discussion virtually to substitute for in-person clinic visit. Patient was located at home and provider was located in office or at home.      An electronic signature was used to authenticate this note.     -- Harshal Lockwood MD      CPT Codes 83374-44064 for Established Patients may apply to this Telehealth Visit.  POS code: 02.  Modifier GT

## 2025-01-14 ENCOUNTER — OFFICE VISIT (OUTPATIENT)
Age: 80
End: 2025-01-14

## 2025-01-14 DIAGNOSIS — I10 ESSENTIAL (PRIMARY) HYPERTENSION: ICD-10-CM

## 2025-01-14 DIAGNOSIS — N18.31 CKD STAGE 3A, GFR 45-59 ML/MIN (HCC): ICD-10-CM

## 2025-01-14 NOTE — PROGRESS NOTES
Chief Complaint   Patient presents with    Labs Only        The patient, Carnetti E Banks, identity was verified by name and , pharmacy verified

## 2025-01-15 DIAGNOSIS — D64.9 ANEMIA, UNSPECIFIED TYPE: Primary | ICD-10-CM

## 2025-01-15 LAB
BASOPHILS # BLD AUTO: 0 X10E3/UL (ref 0–0.2)
BASOPHILS NFR BLD AUTO: 1 %
BUN SERPL-MCNC: 20 MG/DL (ref 8–27)
BUN/CREAT SERPL: 14 (ref 12–28)
CALCIUM SERPL-MCNC: 8.9 MG/DL (ref 8.7–10.3)
CHLORIDE SERPL-SCNC: 104 MMOL/L (ref 96–106)
CO2 SERPL-SCNC: 23 MMOL/L (ref 20–29)
CREAT SERPL-MCNC: 1.4 MG/DL (ref 0.57–1)
EGFRCR SERPLBLD CKD-EPI 2021: 38 ML/MIN/1.73
EOSINOPHIL # BLD AUTO: 0.1 X10E3/UL (ref 0–0.4)
EOSINOPHIL NFR BLD AUTO: 2 %
ERYTHROCYTE [DISTWIDTH] IN BLOOD BY AUTOMATED COUNT: 16.5 % (ref 11.7–15.4)
GLUCOSE SERPL-MCNC: 99 MG/DL (ref 70–99)
HCT VFR BLD AUTO: 29.4 % (ref 34–46.6)
HGB BLD-MCNC: 8.5 G/DL (ref 11.1–15.9)
IMM GRANULOCYTES # BLD AUTO: 0 X10E3/UL (ref 0–0.1)
IMM GRANULOCYTES NFR BLD AUTO: 1 %
LYMPHOCYTES # BLD AUTO: 1.2 X10E3/UL (ref 0.7–3.1)
LYMPHOCYTES NFR BLD AUTO: 37 %
MCH RBC QN AUTO: 23.5 PG (ref 26.6–33)
MCHC RBC AUTO-ENTMCNC: 28.9 G/DL (ref 31.5–35.7)
MCV RBC AUTO: 81 FL (ref 79–97)
MONOCYTES # BLD AUTO: 0.5 X10E3/UL (ref 0.1–0.9)
MONOCYTES NFR BLD AUTO: 17 %
NEUTROPHILS # BLD AUTO: 1.3 X10E3/UL (ref 1.4–7)
NEUTROPHILS NFR BLD AUTO: 42 %
PLATELET # BLD AUTO: 335 X10E3/UL (ref 150–450)
POTASSIUM SERPL-SCNC: 4.4 MMOL/L (ref 3.5–5.2)
RBC # BLD AUTO: 3.62 X10E6/UL (ref 3.77–5.28)
SODIUM SERPL-SCNC: 140 MMOL/L (ref 134–144)
WBC # BLD AUTO: 3.1 X10E3/UL (ref 3.4–10.8)

## 2025-01-15 RX ORDER — FERROUS SULFATE 325(65) MG
325 TABLET ORAL 2 TIMES DAILY
Qty: 180 TABLET | Refills: 1 | Status: SHIPPED | OUTPATIENT
Start: 2025-01-15

## 2025-01-15 NOTE — PROGRESS NOTES
I called and was able to talk directly with patient. The patient was identified by 2 identifiers.    I have discussed the diagnosis of anemia (chronic) and stable CKD 3b.    Recommend seeing Nephro as I suspect anemia of chronic disease but pt prefers to start with Iron and repeat labs at next visit. Emphasized to call office or go to ER if any sob, dizziness or other anemia related symptoms.  No blood in the stool.  -Discussed risks/benefits/precautions/side effects of medication with the patient    Advised to call back directly if there are further questions, or if these symptoms fail to improve as anticipated or worsen.    Signed By: Harshal Lockwood MD     January 15, 2025

## 2025-01-16 LAB
ALBUMIN/CREAT UR: 7 MG/G CREAT (ref 0–29)
CREAT UR-MCNC: 197.1 MG/DL
MICROALBUMIN UR-MCNC: 14 UG/ML
REPORT: NORMAL

## 2025-02-05 ENCOUNTER — TRANSCRIBE ORDERS (OUTPATIENT)
Facility: HOSPITAL | Age: 80
End: 2025-02-05

## 2025-02-05 ENCOUNTER — HOSPITAL ENCOUNTER (OUTPATIENT)
Facility: HOSPITAL | Age: 80
Discharge: HOME | End: 2025-02-08
Payer: MEDICARE

## 2025-02-05 DIAGNOSIS — I49.5 SICK SINUS SYNDROME (HCC): Primary | ICD-10-CM

## 2025-02-05 DIAGNOSIS — I49.5 SICK SINUS SYNDROME (HCC): ICD-10-CM

## 2025-02-05 PROCEDURE — 71046 X-RAY EXAM CHEST 2 VIEWS: CPT

## 2025-03-15 NOTE — TELEPHONE ENCOUNTER
Pt is calling because she needs a refill on Sotalol 80 mg. Pharmacy is confirmed.     [phone] Take Zyrtec: 10 mg daily for rash.   Please start the antibiotic as discussed. We will send a urine culture and advise you if a change is needed in your antibiotic.  Wipe from front to back after using the bathroom every time. Consider wet wipes for cleaning.  You may use a warm pack/heating pad to the back/belly if needed, warm water from the shower to the abdomen, no soaps or lotions to vaginal area.  Change your pad frequently during your menses.  If sexually active urinate before and after sexual intercourse and wipe front to back.  Stay well hydrated, wear cotton underwear to decrease the spread of E. Coli to the urethra.  Please follow up with your primary care provider for further evaluation; call to schedule an appt.   Seek immediate care for worsening symptoms; fever, chills, shortness of breath, lip/tongue swelling, abdominal pain, back pain.

## 2025-04-01 DIAGNOSIS — I48.0 PAF (PAROXYSMAL ATRIAL FIBRILLATION) (HCC): ICD-10-CM

## 2025-04-01 RX ORDER — SOTALOL HYDROCHLORIDE 80 MG/1
TABLET ORAL
Qty: 180 TABLET | Refills: 3 | Status: SHIPPED | OUTPATIENT
Start: 2025-04-01

## 2025-04-01 NOTE — TELEPHONE ENCOUNTER
Requested Prescriptions     Signed Prescriptions Disp Refills    sotalol (BETAPACE) 80 MG tablet 180 tablet 3     Sig: TAKE 1 TABLET BY MOUTH TWICE A DAY AS DIRECTED     Authorizing Provider: EDWARD SALEEM III     Ordering User: MEETA BERNAL      Future Appointments   Date Time Provider Department Center   5/22/2025  2:00 PM Harshal Lockwood MD [facility]   7/8/2025 10:00 AM [facility]      Per Verbal Order by MD/NP

## 2025-04-21 DIAGNOSIS — E87.6 LOW BLOOD POTASSIUM: ICD-10-CM

## 2025-04-22 RX ORDER — POTASSIUM CHLORIDE 1500 MG/1
20 TABLET, EXTENDED RELEASE ORAL DAILY
Qty: 90 TABLET | Refills: 1 | Status: SHIPPED | OUTPATIENT
Start: 2025-04-22

## 2025-04-22 NOTE — TELEPHONE ENCOUNTER
Last appointment: 1/10/25  Next appointment: 5/22/25  Previous refill encounter(s): 10/7/24 #90 with 1 refill    Requested Prescriptions     Pending Prescriptions Disp Refills    potassium chloride (KLOR-CON M) 20 MEQ extended release tablet [Pharmacy Med Name: POTASSIUM CL ER 20 MEQ TAB MCR] 90 tablet 1     Sig: TAKE 1 TABLET BY MOUTH EVERY DAY         For Pharmacy Admin Tracking Only    Program: Medication Refill  CPA in place:    Recommendation Provided To:   Intervention Detail: New Rx: 1, reason: Patient Preference  Intervention Accepted By:   Gap Closed?:    Time Spent (min): 5

## 2025-05-01 NOTE — ED TRIAGE NOTES
Pt reports acute onset of SOB PTA when she got up to go to the bathroom. Denies chest pain, left arm pain, jaw pain, N/V. Taking Eliquis, has pacemaker.
No

## 2025-05-20 ENCOUNTER — TELEPHONE (OUTPATIENT)
Age: 80
End: 2025-05-20

## 2025-05-20 NOTE — TELEPHONE ENCOUNTER
Attempted to contact patient regarding upcoming Medicare wellness appointment and completion of HRA questionnaire. No numbers on file currently working.

## 2025-05-22 ENCOUNTER — OFFICE VISIT (OUTPATIENT)
Age: 80
End: 2025-05-22
Payer: MEDICARE

## 2025-05-22 VITALS
HEIGHT: 62 IN | HEART RATE: 60 BPM | SYSTOLIC BLOOD PRESSURE: 136 MMHG | RESPIRATION RATE: 16 BRPM | OXYGEN SATURATION: 99 % | TEMPERATURE: 97.1 F | BODY MASS INDEX: 36.51 KG/M2 | DIASTOLIC BLOOD PRESSURE: 74 MMHG | WEIGHT: 198.41 LBS

## 2025-05-22 DIAGNOSIS — E87.6 HYPOKALEMIA: ICD-10-CM

## 2025-05-22 DIAGNOSIS — I10 ESSENTIAL (PRIMARY) HYPERTENSION: ICD-10-CM

## 2025-05-22 DIAGNOSIS — D64.9 ANEMIA, UNSPECIFIED TYPE: ICD-10-CM

## 2025-05-22 DIAGNOSIS — Z00.00 MEDICARE ANNUAL WELLNESS VISIT, SUBSEQUENT: Primary | ICD-10-CM

## 2025-05-22 PROCEDURE — G0439 PPPS, SUBSEQ VISIT: HCPCS | Performed by: STUDENT IN AN ORGANIZED HEALTH CARE EDUCATION/TRAINING PROGRAM

## 2025-05-22 PROCEDURE — 99213 OFFICE O/P EST LOW 20 MIN: CPT | Performed by: STUDENT IN AN ORGANIZED HEALTH CARE EDUCATION/TRAINING PROGRAM

## 2025-05-22 PROCEDURE — 3078F DIAST BP <80 MM HG: CPT | Performed by: STUDENT IN AN ORGANIZED HEALTH CARE EDUCATION/TRAINING PROGRAM

## 2025-05-22 PROCEDURE — 3075F SYST BP GE 130 - 139MM HG: CPT | Performed by: STUDENT IN AN ORGANIZED HEALTH CARE EDUCATION/TRAINING PROGRAM

## 2025-05-22 PROCEDURE — 1123F ACP DISCUSS/DSCN MKR DOCD: CPT | Performed by: STUDENT IN AN ORGANIZED HEALTH CARE EDUCATION/TRAINING PROGRAM

## 2025-05-22 PROCEDURE — 1159F MED LIST DOCD IN RCRD: CPT | Performed by: STUDENT IN AN ORGANIZED HEALTH CARE EDUCATION/TRAINING PROGRAM

## 2025-05-22 ASSESSMENT — PATIENT HEALTH QUESTIONNAIRE - PHQ9
1. LITTLE INTEREST OR PLEASURE IN DOING THINGS: NOT AT ALL
SUM OF ALL RESPONSES TO PHQ QUESTIONS 1-9: 0
1. LITTLE INTEREST OR PLEASURE IN DOING THINGS: NOT AT ALL
SUM OF ALL RESPONSES TO PHQ QUESTIONS 1-9: 0
2. FEELING DOWN, DEPRESSED OR HOPELESS: NOT AT ALL
SUM OF ALL RESPONSES TO PHQ QUESTIONS 1-9: 0
2. FEELING DOWN, DEPRESSED OR HOPELESS: NOT AT ALL
SUM OF ALL RESPONSES TO PHQ QUESTIONS 1-9: 0

## 2025-05-22 NOTE — PROGRESS NOTES
Medicare Annual Wellness Visit    Carnetti E Banks is here for Medicare AWV    Assessment & Plan   Medicare annual wellness visit, subsequent  Essential (primary) hypertension  Assessment & Plan:   Chronic, at goal (stable), continue current treatment plan, medication adherence emphasized, and lifestyle modifications recommended  Orders:  -     Basic Metabolic Panel; Future  Anemia, unspecified type  Assessment & Plan:  Repeat labs. Continue daily Iron  Orders:  -     CBC; Future  Hypokalemia  Assessment & Plan:  Repeat labs.  Continue potassium  Orders:  -     Basic Metabolic Panel; Future       No follow-ups on file.     Subjective   The following acute and/or chronic problems were also addressed today:  See separate notes    Patient's complete Health Risk Assessment and screening values have been reviewed and are found in Flowsheets. The following problems were reviewed today and where indicated follow up appointments were made and/or referrals ordered.    Positive Risk Factor Screenings with Interventions:             General HRA Questions:  Select all that apply: (!) New or Increased Pain (Right Leg)  Interventions - Pain:  Will address today        Abnormal BMI (obese):  Body mass index is 36.29 kg/m². (!) Abnormal  Interventions:  low carbohydrate diet, exercise for at least 150 minutes/week          Vision Screen:  Do you have difficulty driving, watching TV, or doing any of your daily activities because of your eyesight?: (!) Yes  Have you had an eye exam within the past year?: (!) No  Interventions:    Does follow with an eye doctor  at [facility]                Objective   Vitals:    05/22/25 1356 05/22/25 1428   BP: (!) 153/80 136/74   Pulse: 60    Resp: 16    Temp: 97.1 °F (36.2 °C)    TempSrc: Temporal    SpO2: 99%    Weight: 90 kg (198 lb 6.6 oz)    Height: 1.575 m (5' 2\")       Body mass index is 36.29 kg/m².                    Allergies   Allergen Reactions    Penicillins Anaphylaxis and Hives

## 2025-05-22 NOTE — PROGRESS NOTES
Chief Complaint   Patient presents with    Medicare AWV     \"Have you been to the ER, urgent care clinic since your last visit?  Hospitalized since your last visit?\"      Yes  2025  [facility] RADIOLOGY  Referred by Alma Jackson ANP   Sick sinus syndrome         “Have you seen or consulted any other health care providers outside of [facility] since your last visit?”    NO       Vitals:    25 1356   BP: (!) 153/80   Pulse: 60   Resp: 16   Temp: 97.1 °F (36.2 °C)   TempSrc: Temporal   SpO2: 99%   Weight: 90 kg (198 lb 6.6 oz)   Height: 1.575 m (5' 2\")      Health Maintenance Due   Topic Date Due    DTaP/Tdap/Td vaccine (1 - Tdap) Never done    Shingles vaccine (1 of 2) Never done    Respiratory Syncytial Virus (RSV) Pregnant or age 60 yrs+ (1 - 1-dose 75+ series) Never done    Pneumococcal 50+ years Vaccine (2 of 2 - PCV) 10/21/2020    COVID-19 Vaccine ( -  season) 2024    Annual Wellness Visit (Medicare Advantage)  2025      The patient, Carnetti E Banks, identity was verified by name and , pharmacy verified  Labs:Yes  Fasting:No

## 2025-05-22 NOTE — PATIENT INSTRUCTIONS
you start a weight-loss plan?  Set realistic goals. Many people expect to lose much more weight than is likely. A weight loss of 5% to 10% of your body weight may be enough to improve your health.  Get family and friends involved to provide support. Talk to them about why you are trying to lose weight, and ask them to help. They can help by participating in exercise and having meals with you, even if they may be eating something different.  Find what works best for you. If you do not have time or do not like to cook, a program that offers meal replacement bars or shakes may be better for you. Or if you like to prepare meals, finding a plan that includes daily menus and recipes may be best.  Ask your doctor about other health professionals who can help you achieve your weight-loss goals.  A dietitian can help you make healthy changes in your diet.  An exercise specialist or  can help you develop a safe and effective exercise program.  A counselor or psychiatrist can help you cope with issues such as depression, anxiety, or family problems that can make it hard to focus on weight loss.  Consider joining a support group for people who are trying to lose weight. Your doctor can suggest groups in your area.  Where can you learn more?  Go to https://www.[employer].net/patientEd and enter U357 to learn more about \"Starting a Weight-Loss Plan: Care Instructions.\"  Current as of: April 30, 2024  Content Version: 14.4  © 8958-8769 [employer].   Care instructions adapted under license by [employer]. If you have questions about a medical condition or this instruction, always ask your healthcare professional. [employer] disclaims any warranty or liability for your use of this information.         A Healthy Heart: Care Instructions  Overview     Coronary artery disease, also called heart disease, occurs when a substance called plaque builds up in the vessels that supply oxygen-rich blood

## 2025-05-22 NOTE — PROGRESS NOTES
[facility]  [address] [place] [address] [phone]    Chief Complaint: Acute/chronic medical problems    Subjective  Carnetti E Banks is a 79 y.o. Black /  female , established patient, here for evaluation of the concern(s) below;    SUBJECTIVE:        PMH sig for HTN, HLD, Insomnia, PAF, SSS s/p pacemaker, CAD s/p 4vCABG, hx of pyelonephritis with ureteral stent for hydronephrosis, and right renal cyst here for routine follow up on chronic medical conditions;    HTN with CKD 3a:  Currently on Losartan 50mg , lasix 20mg daily and sotalol 80mg BID.    Pt is doing well on current meds with no medication side effects noted  No new myalgias, no joint pains, no weakness  Exercises regularly with walking.  Compliant with diabetic diet.  Avoids sodas and sweet teas.  Avoids fast food.  Limits carbs.  Pt is a non smoker.    Anemia:  On Iron supplements.     Other chronic medical problems:    PAF, SSS s/p pacemaker, CAD s/p 4vCABG:  Following with Dr. Saleem for general cardiac care and Dr. Juarez for EP.  No new sx.  Denies orthopnea or PND.  Denies any chest pain, shortness of breath, dyspnea on exertion.  Denies any stroke-like symptoms.  Did have a TIA type presentation back in 4/2020 with some numbness and tingling in her right hand.        Colonoscopy last in 2018 with Dr. Gonzales and had 4 mm tubular adenoma on path. Declines new colonoscopy at this point.     Social Hx:  Retired from [employer] after 54 years. Retired 12/2023.  Has 4 children; 2 daughters and 2 sons.     Review of systems:       A comprehensive review of systems was negative except for that written in the History of Present Illness.      Allergies - reviewed:   Allergies   Allergen Reactions    Penicillins Anaphylaxis and Hives     Just got Rocephin in ED, no reactions    Ace Inhibitors Cough    Niacin      Other reaction(s): Unable to Obtain       Past Medical History -

## 2025-05-23 LAB
BUN SERPL-MCNC: 10 MG/DL (ref 8–27)
BUN/CREAT SERPL: 9 (ref 12–28)
CALCIUM SERPL-MCNC: 9 MG/DL (ref 8.7–10.3)
CHLORIDE SERPL-SCNC: 107 MMOL/L (ref 96–106)
CO2 SERPL-SCNC: 21 MMOL/L (ref 20–29)
CREAT SERPL-MCNC: 1.06 MG/DL (ref 0.57–1)
EGFRCR SERPLBLD CKD-EPI 2021: 53 ML/MIN/1.73
ERYTHROCYTE [DISTWIDTH] IN BLOOD BY AUTOMATED COUNT: 13.8 % (ref 11.7–15.4)
GLUCOSE SERPL-MCNC: 99 MG/DL (ref 70–99)
HCT VFR BLD AUTO: 34.8 % (ref 34–46.6)
HGB BLD-MCNC: 11.1 G/DL (ref 11.1–15.9)
MCH RBC QN AUTO: 28.6 PG (ref 26.6–33)
MCHC RBC AUTO-ENTMCNC: 31.9 G/DL (ref 31.5–35.7)
MCV RBC AUTO: 90 FL (ref 79–97)
PLATELET # BLD AUTO: 214 X10E3/UL (ref 150–450)
POTASSIUM SERPL-SCNC: 4 MMOL/L (ref 3.5–5.2)
RBC # BLD AUTO: 3.88 X10E6/UL (ref 3.77–5.28)
REPORT: NORMAL
SODIUM SERPL-SCNC: 140 MMOL/L (ref 134–144)
WBC # BLD AUTO: 3.9 X10E3/UL (ref 3.4–10.8)

## 2025-05-27 ENCOUNTER — RESULTS FOLLOW-UP (OUTPATIENT)
Age: 80
End: 2025-05-27

## 2025-06-26 DIAGNOSIS — I10 ESSENTIAL (PRIMARY) HYPERTENSION: ICD-10-CM

## 2025-06-26 DIAGNOSIS — D64.9 ANEMIA, UNSPECIFIED TYPE: ICD-10-CM

## 2025-06-26 RX ORDER — FUROSEMIDE 20 MG/1
20 TABLET ORAL DAILY
Qty: 90 TABLET | Refills: 3 | Status: SHIPPED | OUTPATIENT
Start: 2025-06-26

## 2025-06-26 RX ORDER — FERROUS SULFATE 325(65) MG
1 TABLET ORAL 2 TIMES DAILY
Qty: 180 TABLET | Refills: 1 | Status: SHIPPED | OUTPATIENT
Start: 2025-06-26

## 2025-06-26 RX ORDER — LOSARTAN POTASSIUM 50 MG/1
50 TABLET ORAL DAILY
Qty: 90 TABLET | Refills: 1 | Status: SHIPPED | OUTPATIENT
Start: 2025-06-26

## 2025-06-26 NOTE — TELEPHONE ENCOUNTER
Last appointment: 5/22/25  Next appointment: 8/25/25  Previous refill encounter(s): 1/15/25 Iron #180 with 1 refill, 1/10/25 Cozaar #90 with 1 refill    Requested Prescriptions     Pending Prescriptions Disp Refills    losartan (COZAAR) 50 MG tablet [Pharmacy Med Name: LOSARTAN POTASSIUM 50 MG TAB] 90 tablet 1     Sig: TAKE 1 TABLET BY MOUTH EVERY DAY    ferrous sulfate (IRON 325) 325 (65 Fe) MG tablet [Pharmacy Med Name: FERROUS SULFATE 325 MG TABLET] 180 tablet 1     Sig: TAKE 1 TABLET BY MOUTH TWICE A DAY         For Pharmacy Admin Tracking Only    Program: Medication Refill  CPA in place:    Recommendation Provided To:   Intervention Detail: New Rx: 2, reason: Patient Preference  Intervention Accepted By:   Gap Closed?:    Time Spent (min): 5

## 2025-06-26 NOTE — TELEPHONE ENCOUNTER
Requested Prescriptions     Signed Prescriptions Disp Refills    furosemide (LASIX) 20 MG tablet 90 tablet 3     Sig: TAKE 1 TABLET BY MOUTH EVERY DAY     Authorizing Provider: JOSE SALEEM III     Ordering User: MEETA BERNAL      Future Appointments   Date Time Provider Department Center   7/10/2025  1:20 PM [facility]   8/25/2025  2:00 PM Harshal Lockwood MD [facility]      Per Verbal Order by MD/NP

## 2025-07-10 ENCOUNTER — OFFICE VISIT (OUTPATIENT)
Age: 80
End: 2025-07-10
Payer: MEDICARE

## 2025-07-10 VITALS
HEART RATE: 61 BPM | HEIGHT: 62 IN | DIASTOLIC BLOOD PRESSURE: 72 MMHG | SYSTOLIC BLOOD PRESSURE: 132 MMHG | OXYGEN SATURATION: 97 % | BODY MASS INDEX: 35.57 KG/M2 | WEIGHT: 193.3 LBS

## 2025-07-10 DIAGNOSIS — I25.10 CORONARY ARTERY DISEASE INVOLVING NATIVE CORONARY ARTERY OF NATIVE HEART WITHOUT ANGINA PECTORIS: ICD-10-CM

## 2025-07-10 DIAGNOSIS — I10 ESSENTIAL (PRIMARY) HYPERTENSION: ICD-10-CM

## 2025-07-10 DIAGNOSIS — I48.0 PAF (PAROXYSMAL ATRIAL FIBRILLATION) (HCC): Primary | ICD-10-CM

## 2025-07-10 DIAGNOSIS — Z95.1 S/P CABG X 4: ICD-10-CM

## 2025-07-10 DIAGNOSIS — Z95.0 PACEMAKER: ICD-10-CM

## 2025-07-10 DIAGNOSIS — E78.2 MIXED HYPERLIPIDEMIA: ICD-10-CM

## 2025-07-10 PROCEDURE — 1123F ACP DISCUSS/DSCN MKR DOCD: CPT | Performed by: SPECIALIST

## 2025-07-10 PROCEDURE — 3075F SYST BP GE 130 - 139MM HG: CPT | Performed by: SPECIALIST

## 2025-07-10 PROCEDURE — 99214 OFFICE O/P EST MOD 30 MIN: CPT | Performed by: SPECIALIST

## 2025-07-10 PROCEDURE — 1159F MED LIST DOCD IN RCRD: CPT | Performed by: SPECIALIST

## 2025-07-10 PROCEDURE — 3078F DIAST BP <80 MM HG: CPT | Performed by: SPECIALIST

## 2025-07-10 ASSESSMENT — PATIENT HEALTH QUESTIONNAIRE - PHQ9
SUM OF ALL RESPONSES TO PHQ QUESTIONS 1-9: 0
1. LITTLE INTEREST OR PLEASURE IN DOING THINGS: NOT AT ALL
2. FEELING DOWN, DEPRESSED OR HOPELESS: NOT AT ALL

## 2025-07-10 NOTE — PROGRESS NOTES
1. Have you been to the ER, urgent care clinic since your last visit?  Hospitalized since your last visit?No    2. Have you seen or consulted any other health care providers outside of the [facility] since your last visit?  Include any pap smears or colon screening. No    
80 MG tablet TAKE 1 TABLET BY MOUTH TWICE A DAY AS DIRECTED 180 tablet 3    atorvastatin (LIPITOR) 40 MG tablet TAKE 1 TABLET BY MOUTH EVERY DAY 90 tablet 3    apixaban (ELIQUIS) 5 MG TABS tablet Take 1 tablet by mouth 2 times daily 180 tablet 3    Multiple Vitamin (MULTIVITAMIN ADULT PO) Take by mouth daily      aspirin 81 MG EC tablet Take 1 tablet by mouth daily      diclofenac sodium (VOLTAREN) 1 % GEL APPLY 2 GRAMS TO AFFECTED AREA(S) FOUR TIMES DAILY. (Patient not taking: Reported on 7/10/2025) 200 g 1     No current facility-administered medications for this visit.         ALLERGIES  Penicillins, Ace inhibitors, and Niacin       ASSESSMENT & PLAN:      She is stable and asymptomatic, well compensated on a good medical regimen and needs no cardiac testing at this time.  She will remain on omega-3 fatty acids, Lasix, losartan, potassium, Betapace, atorvastatin, Eliquis and low-dose aspirin.    Current treatment plan is effective,reviewed diet, exercise and weight control     1. PAF (paroxysmal atrial fibrillation) (HCC)  2. Mixed hyperlipidemia  3. Pacemaker  4. S/P CABG x 4  5. Coronary artery disease involving native coronary artery of native heart without angina pectoris  6. Essential (primary) hypertension       Future Appointments   Date Time Provider Department Center   8/25/2025  2:00 PM Harshal Lockwood MD [facility] E[facility] DEP        Jose Saleem MD  7/10/2025  Please note that this dictation was completed with [employer], the computer voice recognition software.  Quite often unanticipated grammatical, syntax, homophones, and other interpretive errors are inadvertently transcribed by the computer software.  Please disregard these errors.  Please excuse any errors that have escaped final proofreading.  Thank you.

## 2025-07-23 DIAGNOSIS — I48.0 PAF (PAROXYSMAL ATRIAL FIBRILLATION) (HCC): ICD-10-CM

## 2025-07-23 RX ORDER — APIXABAN 5 MG/1
5 TABLET, FILM COATED ORAL 2 TIMES DAILY
Qty: 180 TABLET | Refills: 1 | Status: SHIPPED | OUTPATIENT
Start: 2025-07-23

## 2025-07-23 NOTE — TELEPHONE ENCOUNTER
Requested Prescriptions     Signed Prescriptions Disp Refills    apixaban (ELIQUIS) 5 MG TABS tablet 180 tablet 1     Sig: TAKE 1 TABLET BY MOUTH TWICE A DAY     Authorizing Provider: EDWARD SALEEM III     Ordering User: MEETA BERNAL      Future Appointments   Date Time Provider Department Center   8/25/2025  2:00 PM Harshal Lockwood MD [facility]   1/6/2026  1:00 PM [facility]      Per Verbal Order by MD/NP

## 2025-08-25 ENCOUNTER — OFFICE VISIT (OUTPATIENT)
Age: 80
End: 2025-08-25
Payer: MEDICARE

## 2025-08-25 ENCOUNTER — TELEPHONE (OUTPATIENT)
Age: 80
End: 2025-08-25

## 2025-08-25 VITALS
HEIGHT: 62 IN | RESPIRATION RATE: 16 BRPM | DIASTOLIC BLOOD PRESSURE: 78 MMHG | TEMPERATURE: 96.9 F | BODY MASS INDEX: 35.3 KG/M2 | SYSTOLIC BLOOD PRESSURE: 138 MMHG | WEIGHT: 191.8 LBS

## 2025-08-25 DIAGNOSIS — E78.2 MIXED HYPERLIPIDEMIA: ICD-10-CM

## 2025-08-25 DIAGNOSIS — I10 ESSENTIAL (PRIMARY) HYPERTENSION: ICD-10-CM

## 2025-08-25 PROCEDURE — 99213 OFFICE O/P EST LOW 20 MIN: CPT | Performed by: STUDENT IN AN ORGANIZED HEALTH CARE EDUCATION/TRAINING PROGRAM

## 2025-08-25 PROCEDURE — 1159F MED LIST DOCD IN RCRD: CPT | Performed by: STUDENT IN AN ORGANIZED HEALTH CARE EDUCATION/TRAINING PROGRAM

## 2025-08-25 PROCEDURE — 3078F DIAST BP <80 MM HG: CPT | Performed by: STUDENT IN AN ORGANIZED HEALTH CARE EDUCATION/TRAINING PROGRAM

## 2025-08-25 PROCEDURE — 1123F ACP DISCUSS/DSCN MKR DOCD: CPT | Performed by: STUDENT IN AN ORGANIZED HEALTH CARE EDUCATION/TRAINING PROGRAM

## 2025-08-25 PROCEDURE — 3075F SYST BP GE 130 - 139MM HG: CPT | Performed by: STUDENT IN AN ORGANIZED HEALTH CARE EDUCATION/TRAINING PROGRAM

## (undated) DEVICE — SNARE ENDOSCP M L240CM W27MM SHTH DIA2.4MM CHN 2.8MM OVL

## (undated) DEVICE — Device: Brand: MEDEX

## (undated) DEVICE — TOWEL 4 PLY TISS 19X30 SUE WHT

## (undated) DEVICE — SYR 10ML LUER LOK 1/5ML GRAD --

## (undated) DEVICE — CATH IV AUTOGRD BC BLU 22GA 25 -- INSYTE

## (undated) DEVICE — BASIN EMSIS 16OZ GRAPHITE PLAS KID SHP MOLD GRAD FOR ORAL

## (undated) DEVICE — SET ADMIN 16ML TBNG L100IN 2 Y INJ SITE IV PIGGY BK DISP

## (undated) DEVICE — SOLIDIFIER MEDC 1200ML -- CONVERT TO 356117

## (undated) DEVICE — CONTAINER SPEC 20 ML LID NEUT BUFF FORMALIN 10 % POLYPR STS

## (undated) DEVICE — KENDALL RADIOLUCENT FOAM MONITORING ELECTRODE RECTANGULAR SHAPE: Brand: KENDALL

## (undated) DEVICE — Z DISCONTINUED PER MEDLINE LINE GAS SAMPLING O2/CO2 LNG AD 13 FT NSL W/ TBNG FILTERLINE

## (undated) DEVICE — NEEDLE HYPO 18GA L1.5IN PNK S STL HUB POLYPR SHLD REG BVL

## (undated) DEVICE — STRAINER URIN CALC RNL MSH -- CONVERT TO ITEM 357634

## (undated) DEVICE — SYR 3ML LL TIP 1/10ML GRAD --

## (undated) DEVICE — TRAP SUC MUCOUS 70ML -- MEDICHOICE MEDLINE

## (undated) DEVICE — Device

## (undated) DEVICE — 1200 GUARD II KIT W/5MM TUBE W/O VAC TUBE: Brand: GUARDIAN